# Patient Record
Sex: FEMALE | Race: BLACK OR AFRICAN AMERICAN | Employment: UNEMPLOYED | URBAN - METROPOLITAN AREA
[De-identification: names, ages, dates, MRNs, and addresses within clinical notes are randomized per-mention and may not be internally consistent; named-entity substitution may affect disease eponyms.]

---

## 2017-01-05 ENCOUNTER — OFFICE VISIT (OUTPATIENT)
Dept: CARDIOLOGY CLINIC | Age: 32
End: 2017-01-05

## 2017-01-05 VITALS
DIASTOLIC BLOOD PRESSURE: 120 MMHG | SYSTOLIC BLOOD PRESSURE: 160 MMHG | WEIGHT: 175 LBS | HEIGHT: 62 IN | HEART RATE: 94 BPM | OXYGEN SATURATION: 99 % | BODY MASS INDEX: 32.2 KG/M2

## 2017-01-05 DIAGNOSIS — R00.2 PALPITATION: Primary | ICD-10-CM

## 2017-01-05 NOTE — MR AVS SNAPSHOT
Visit Information Date & Time Provider Department Dept. Phone Encounter #  
 1/5/2017  9:20 AM Macarena Choudhary MD [facility] [id number] Your Appointments 2/9/2017  9:00 AM  
ROUTINE CARE with Iram Mendoza MD  
[facility] ([facility]) Appt Note: 3m fu bp/chol; .  
 [address] [address] [address] [place] [address] [id number] Upcoming Health Maintenance Date Due Pneumococcal 19-64 Medium Risk (1 of 1 - PPSV23) 4/8/2002 HEMOGLOBIN A1C Q6M 3/29/2017 MICROALBUMIN Q1 5/31/2017 FOOT EXAM Q1 6/19/2017 LIPID PANEL Q1 9/19/2017 PAP AKA CERVICAL CYTOLOGY 10/7/2017 EYE EXAM RETINAL OR DILATED Q1 11/4/2017 DTaP/Tdap/Td series (2 - Td) 10/15/2025 Allergies as of 1/5/2017  Review Complete On: 11/2/2016 By: Iram Mendoza MD  
  
 Severity Noted Reaction Type Reactions Latex  05/20/2010    Hives Macrobid [Nitrofurantoin Monohyd/m-cryst]  07/21/2011   Intolerance Diarrhea Novolog Mix 70-30 [Insulin Asp Prt-insulin Aspart]  01/31/2013   Intolerance Nausea and Vomiting Current Immunizations  Reviewed on 10/15/2015 Name Date Influenza Vaccine (Quad) PF 10/3/2016, 10/15/2015, 9/11/2014 Influenza Vaccine PF 12/11/2012  1:10 PM  
 Tdap 10/15/2015 Not reviewed this visit You Were Diagnosed With   
  
 Codes Comments Palpitation    -  Primary ICD-10-CM: R00.2 ICD-9-CM: 785.1 Vitals BP Pulse Height(growth percentile) Weight(growth percentile) SpO2 BMI  
 (!) 160/120 (BP 1 Location: Left arm, BP Patient Position: Sitting) 94 5' 2\" (1.575 m) 175 lb (79.4 kg) 99% 32.01 kg/m2 OB Status Smoking Status Having regular periods Never Smoker Vitals History BMI and BSA Data Body Mass Index Body Surface Area 32.01 kg/m 2 1.86 m 2 Preferred Pharmacy Pharmacy Name Phone [phone] [facility] [address] [phone] Your Updated Medication List  
  
   
This list is accurate as of: 1/5/17 10:54 AM.  Always use your most recent med list.  
  
  
  
  
 AMARYL 4 mg tablet Generic drug:  glimepiride Take  by mouth two (2) times a day. bethanechol 10 mg tablet Commonly known as:  URECHOLINE Blood-Glucose Meter monitoring kit  
by Does Not Apply route two (2) times a day. butalbital-acetaminophen-caffeine -40 mg per tablet Commonly known as:  Rosina Hanly Take 1 Tab by mouth every six (6) hours as needed for Pain. Max Daily Amount: 4 Tabs. fluticasone 50 mcg/actuation nasal spray Commonly known as:  Khang Redo 2 Sprays by Both Nostrils route daily as needed for Rhinitis or Allergies. furosemide 20 mg tablet Commonly known as:  LASIX Take as needed daily for leg swelling. glucose blood VI test strips strip Commonly known as:  ONETOUCH ULTRA TEST  
by Does Not Apply route. One touch ultra mini test strips HumaLOG 100 unit/mL injection Generic drug:  insulin lispro  
by SubCUTAneous route. 20-40 units with meals  
  
 losartan 100 mg tablet Commonly known as:  COZAAR Take 1 Tab by mouth daily. pregabalin 75 mg capsule Commonly known as:  Yunior Barks Take 1 Cap by mouth three (3) times daily. propranolol LA 80 mg SR capsule Commonly known as:  INDERAL LA  
TAKE 1 CAPSULE BY MOUTH DAILY  
  
 simvastatin 40 mg tablet Commonly known as:  ZOCOR Take 1 Tab by mouth nightly. SYNTHROID PO Take  by mouth. terconazole 0.4 % vaginal cream  
Commonly known as:  TERAZOL 7 Patient Instructions Amlodipine (By mouth) Amlodipine (hm-DZU-ed-peen) Treats high blood pressure and angina (chest pain). This medicine is a calcium channel blocker. Brand Name(s):Norvasc There may be other brand names for this medicine. When This Medicine Should Not Be Used: This medicine is not right for everyone. Do not use it if you had an allergic reaction to amlodipine. How to Use This Medicine:  
Tablet, Dissolving Tablet · Take your medicine as directed. Your dose may need to be changed several times to find what works best for you. Take this medicine at the same time each day. · Read and follow the patient instructions that come with this medicine. Talk to your doctor or pharmacist if you have any questions. · Missed dose: Take a dose as soon as you remember. If it has been more than 12 hours since you were supposed to take your dose, skip the missed dose and take your next regular dose at the regular time. · Store the medicine in a closed container at room temperature, away from heat, moisture, and direct light. Drugs and Foods to Avoid: Ask your doctor or pharmacist before using any other medicine, including over-the-counter medicines, vitamins, and herbal products. · Some medicines can affect how amlodipine works. Tell your doctor if you are also using any of the following: ¨ Clarithromycin, cyclosporine, diltiazem, itraconazole, ritonavir, sildenafil, simvastatin, tacrolimus Warnings While Using This Medicine: · Tell your doctor if you are pregnant or breastfeeding, or if you have liver disease, heart disease, coronary artery disease, or aortic stenosis. · This medicine could lower your blood pressure too much, especially when you first use it or if you are dehydrated. Stand or sit up slowly if you feel lightheaded or dizzy. · Your doctor will check your progress and the effects of this medicine at regular visits. Keep all appointments. · Do not stop using this medicine without asking your doctor, even if you feel well. This medicine will not cure high blood pressure, but it will help keep it in normal range.  You may have to take blood pressure medicine for the rest of your life. · Keep all medicine out of the reach of children. Never share your medicine with anyone. Possible Side Effects While Using This Medicine:  
Call your doctor right away if you notice any of these side effects: · Allergic reaction: Itching or hives, swelling in your face or hands, swelling or tingling in your mouth or throat, chest tightness, trouble breathing · Lightheadedness, dizziness · New or worsening chest pain · Swelling in your hands, ankles, or legs · Trouble breathing, nausea, unusual sweating, fainting If you notice other side effects that you think are caused by this medicine, tell your doctor. Call your doctor for medical advice about side effects. You may report side effects to FDA at 6-814-ERH-7255 © 2016 [address] is for End User's use only and may not be sold, redistributed or otherwise used for commercial purposes. The above information is an  only. It is not intended as medical advice for individual conditions or treatments. Talk to your doctor, nurse or pharmacist before following any medical regimen to see if it is safe and effective for you. Introducing [facility]! Dear Lonnie Henriquez: 
Thank you for requesting a [employer] account. Our records indicate that you already have an active [employer] account. You can access your account anytime at [email or web address] Did you know that you can access your hospital and ER discharge instructions at any time in [employer]? You can also review all of your test results from your hospital stay or ER visit. Additional Information If you have questions, please visit the Frequently Asked Questions section of the [employer] website at [email or web address] Remember, MyChart is NOT to be used for urgent needs. For medical emergencies, dial 911. Now available from your iPhone and Android! Please provide this summary of care documentation to your next provider. Your primary care clinician is listed as Tish Lambert. If you have any questions after today's visit, please call [phone].

## 2017-01-05 NOTE — PATIENT INSTRUCTIONS
Amlodipine (By mouth)   Amlodipine (kl-LQA-is-peen)  Treats high blood pressure and angina (chest pain). This medicine is a calcium channel blocker. Brand Name(s):Norvasc   There may be other brand names for this medicine. When This Medicine Should Not Be Used: This medicine is not right for everyone. Do not use it if you had an allergic reaction to amlodipine. How to Use This Medicine:   Tablet, Dissolving Tablet  · Take your medicine as directed. Your dose may need to be changed several times to find what works best for you. Take this medicine at the same time each day. · Read and follow the patient instructions that come with this medicine. Talk to your doctor or pharmacist if you have any questions. · Missed dose: Take a dose as soon as you remember. If it has been more than 12 hours since you were supposed to take your dose, skip the missed dose and take your next regular dose at the regular time. · Store the medicine in a closed container at room temperature, away from heat, moisture, and direct light. Drugs and Foods to Avoid:   Ask your doctor or pharmacist before using any other medicine, including over-the-counter medicines, vitamins, and herbal products. · Some medicines can affect how amlodipine works. Tell your doctor if you are also using any of the following:   ¨ Clarithromycin, cyclosporine, diltiazem, itraconazole, ritonavir, sildenafil, simvastatin, tacrolimus  Warnings While Using This Medicine:   · Tell your doctor if you are pregnant or breastfeeding, or if you have liver disease, heart disease, coronary artery disease, or aortic stenosis. · This medicine could lower your blood pressure too much, especially when you first use it or if you are dehydrated. Stand or sit up slowly if you feel lightheaded or dizzy. · Your doctor will check your progress and the effects of this medicine at regular visits. Keep all appointments.   · Do not stop using this medicine without asking your doctor, even if you feel well. This medicine will not cure high blood pressure, but it will help keep it in normal range. You may have to take blood pressure medicine for the rest of your life. · Keep all medicine out of the reach of children. Never share your medicine with anyone. Possible Side Effects While Using This Medicine:   Call your doctor right away if you notice any of these side effects:  · Allergic reaction: Itching or hives, swelling in your face or hands, swelling or tingling in your mouth or throat, chest tightness, trouble breathing  · Lightheadedness, dizziness  · New or worsening chest pain  · Swelling in your hands, ankles, or legs  · Trouble breathing, nausea, unusual sweating, fainting  If you notice other side effects that you think are caused by this medicine, tell your doctor. Call your doctor for medical advice about side effects. You may report side effects to FDA at 0-146-FDA-3059  © 2016 [address] is for End User's use only and may not be sold, redistributed or otherwise used for commercial purposes. The above information is an  only. It is not intended as medical advice for individual conditions or treatments. Talk to your doctor, nurse or pharmacist before following any medical regimen to see if it is safe and effective for you.

## 2017-01-06 RX ORDER — AMLODIPINE BESYLATE 5 MG/1
5 TABLET ORAL DAILY
Qty: 30 TAB | Refills: 6 | Status: SHIPPED | OUTPATIENT
Start: 2017-01-06 | End: 2017-01-06 | Stop reason: SDUPTHER

## 2017-01-06 RX ORDER — AMLODIPINE BESYLATE 5 MG/1
5 TABLET ORAL DAILY
Qty: 30 TAB | Refills: 3 | Status: SHIPPED | OUTPATIENT
Start: 2017-01-06 | End: 2017-02-09 | Stop reason: DRUGHIGH

## 2017-01-06 NOTE — TELEPHONE ENCOUNTER
Pt bp today in the office was 160/100. Pt has not taking any medications this morning. Pt was to start taking the Norvasc 5 mg daily yesterday but was sent to wrong pharmacy. [facility]. Verbal order and read back per Dr Wesley Darling     Pt instructed to increase Norvasc to 10 mg today and tomorrow then decrease to 5 mg daily.

## 2017-01-06 NOTE — PROGRESS NOTES
\"       PATIENT NAME: Aleah Licona         33 y.o.      1985              DATE:1/5/2017    REASON FOR VISIT:palpitations    HISTORY OF PRESENT ILLNESS:The patient was given the diagnosis of a transient ischemic attack in September of last year. The symptoms were a little unusual for transient ischemic attack in that they involve pain in the left upper extremity as well as some weakness. In any event, she has been experiencing palpitations. The patient can't really tell me how often she experiences them. These are very brief in duration. It is not clear as to what they actually feel like. Sounds like a sensation of irregular heartbeat. They're not accompanied by syncope or presyncope. The patient is not experiencing chest pain. The patient does experience shortness of breath on exertion. The patient is a hypertensive diabetic. She is being treated for hyperlipidemia. There is no history of coronary artery disease or valvular heart disease. The patient apparently did have an echocardiogram done in September at the time of the diagnosis of transient ischemic attack. PAST MEDICAL HISTORY:   Past Medical History:    Diabetes ([facility] [address]                                                Diabetic neuropathy ([facility] [address]                                     Diabetic retinopathy                                          Hypercholesterolemia                                          PAST SURGICAL HISTORY:   Past Surgical History:    HX ORTHOPAEDIC                                   January 2*      Comment:right toe nail surgery Dr. Ramón Wallis:  Social History    Marital status: UNKNOWN             Spouse name:                       Years of education:                 Number of children:               Social History Main Topics    Smoking status: Never Smoker                                                                Smokeless status: Never Used                        Alcohol use: No              Drug use:  No ALLERGIES:    -- Latex -- Hives   -- Macrobid [Nitrofurantoin Monohyd/M-Cryst] -- Diarrhea   -- Novolog Mix 70-30 [Insulin Asp Prt-Insulin Aspart] -- Nausea and Vomiting     CURRENT MEDICATIONS:   Current Outpatient Prescriptions:  amLODIPine (NORVASC) 5 mg tablet, Take 1 Tab by mouth daily. propranolol LA (INDERAL LA) 80 mg SR capsule, TAKE 1 CAPSULE BY MOUTH DAILY  simvastatin (ZOCOR) 40 mg tablet, Take 1 Tab by mouth nightly. furosemide (LASIX) 20 mg tablet, Take as needed daily for leg swelling. losartan (COZAAR) 100 mg tablet, Take 1 Tab by mouth daily. butalbital-acetaminophen-caffeine (FIORICET, ESGIC) -40 mg per tablet, Take 1 Tab by mouth every six (6) hours as needed for Pain. Max Daily Amount: 4 Tabs. bethanechol (URECHOLINE) 10 mg tablet,   terconazole (TERAZOL 7) 0.4 % vaginal cream,   glimepiride (AMARYL) 4 mg tablet, Take  by mouth two (2) times a day. LEVOTHYROXINE SODIUM (SYNTHROID PO), Take  by mouth. insulin lispro (HUMALOG) 100 unit/mL injection, by SubCUTAneous route. 20-40 units with meals  fluticasone (FLONASE) 50 mcg/actuation nasal spray, 2 Sprays by Both Nostrils route daily as needed for Rhinitis or Allergies. pregabalin (LYRICA) 75 mg capsule, Take 1 Cap by mouth three (3) times daily. Blood-Glucose Meter monitoring kit, by Does Not Apply route two (2) times a day. glucose blood VI test strips (ONE TOUCH ULTRA TEST) strip, by Does Not Apply route. One touch ultra mini test strips  amLODIPine (NORVASC) 5 mg tablet, Take 1 Tab by mouth daily. No current facility-administered medications for this visit.        REVIEW of SYSTEMS:History obtained from chart review and the patient  General ROS: negative for - weight gain or weight loss  Hematological and Lymphatic ROS: negative for - bleeding problems  Respiratory ROS: history of present illness  Cardiovascular ROS: hhistory of present illness  Gastrointestinal ROS: no abdominal pain, change in bowel habits, or black or bloody stools  Neurological ROS: no TIA or stroke symptoms     PHYSICAL EXAMINATION:   BP (!) 160/120 (BP 1 Location: Left arm, BP Patient Position: Sitting)  Pulse 94  Ht 5' 2\" (1.575 m)  Wt 79.4 kg (175 lb)  SpO2 99%  BMI 32.01 kg/m2  BP Readings from Last 3 Encounters:  01/05/17 : (!) 160/120  11/01/16 : 130/80  10/03/16 : (!) 170/100    Pulse Readings from Last 3 Encounters:  01/05/17 : 94  11/01/16 : 83  10/03/16 : 88    Wt Readings from Last 3 Encounters:  01/05/17 : 79.4 kg (175 lb)  11/01/16 : 80.7 kg (178 lb)  10/03/16 : 79.8 kg (176 lb)    Gen. : Obese  female NAD. \"HEENT: Sclera clear. Mucous membranes pink and moist.  Neck: No jugular venous distention. Carotid upstrokes 2+ without bruits. Chest: Clear to percussion and auscultation. Heart: PMI not palpable. Regular rhythm. No murmur or gallop. Abdomen: Nontender without masses or organomegaly. Extremities: No edema. .  Skin: Warm and dry. No stasis changes. Neuro: Alert, oriented, speech WNL, no facial asymmetry. Gait WNL. \"    EKG: Sinus rhythm. Short W[place]    IMPRESSION:   Palpitations of unknown etiology  Short P-R syndrome  The patient is hypertensive today. A repeat blood pressure showed a systolic of approximately 200. Possible history of transient ischemic attack. Unusual in that it involved pain. Diabetes  Hyperlipidemia    PLAN:  Norvasc 5 mg p.o. now and then daily. Followup with primary care physician tomorrow for blood pressure check. Cardiac event recorder  Return to office after the event recorder    The diagnoses and plan were discussed with patient. All questions answered. Plan of care agreed to by all concerned. Marcelino Russell.  MD [place]       ,              \"

## 2017-01-10 DIAGNOSIS — I10 ESSENTIAL HYPERTENSION WITH GOAL BLOOD PRESSURE LESS THAN 130/80: ICD-10-CM

## 2017-01-11 RX ORDER — LOSARTAN POTASSIUM 100 MG/1
TABLET ORAL
Qty: 90 TAB | Refills: 0 | Status: SHIPPED | OUTPATIENT
Start: 2017-01-11 | End: 2018-04-10 | Stop reason: SDUPTHER

## 2017-01-12 LAB
CREATININE, EXTERNAL: 71.1
HBA1C MFR BLD HPLC: 9.4 %
LDL-C, EXTERNAL: 131
MICROALBUMIN UR TEST STR-MCNC: 4039.5 MG/DL

## 2017-02-09 ENCOUNTER — OFFICE VISIT (OUTPATIENT)
Dept: FAMILY MEDICINE CLINIC | Age: 32
End: 2017-02-09

## 2017-02-09 ENCOUNTER — HOSPITAL ENCOUNTER (OUTPATIENT)
Dept: LAB | Age: 32
Discharge: HOME | End: 2017-02-09
Payer: MEDICARE

## 2017-02-09 VITALS
RESPIRATION RATE: 16 BRPM | TEMPERATURE: 98 F | HEIGHT: 62 IN | SYSTOLIC BLOOD PRESSURE: 160 MMHG | BODY MASS INDEX: 32.76 KG/M2 | OXYGEN SATURATION: 99 % | WEIGHT: 178 LBS | DIASTOLIC BLOOD PRESSURE: 100 MMHG | HEART RATE: 95 BPM

## 2017-02-09 DIAGNOSIS — I10 HTN (HYPERTENSION), BENIGN: ICD-10-CM

## 2017-02-09 DIAGNOSIS — E78.00 PURE HYPERCHOLESTEROLEMIA: ICD-10-CM

## 2017-02-09 DIAGNOSIS — E78.00 PURE HYPERCHOLESTEROLEMIA: Primary | ICD-10-CM

## 2017-02-09 LAB
ALT SERPL-CCNC: 16 U/L (ref 13–56)
ANION GAP BLD CALC-SCNC: 10 MMOL/L (ref 3–18)
AST SERPL W P-5'-P-CCNC: 16 U/L (ref 15–37)
BUN SERPL-MCNC: 24 MG/DL (ref 7–18)
BUN/CREAT SERPL: 17 (ref 12–20)
CALCIUM SERPL-MCNC: 8.4 MG/DL (ref 8.5–10.1)
CHLORIDE SERPL-SCNC: 106 MMOL/L (ref 100–108)
CHOLEST SERPL-MCNC: 288 MG/DL
CO2 SERPL-SCNC: 25 MMOL/L (ref 21–32)
CREAT SERPL-MCNC: 1.45 MG/DL (ref 0.6–1.3)
GLUCOSE SERPL-MCNC: 322 MG/DL (ref 74–99)
HDLC SERPL-MCNC: 55 MG/DL (ref 40–60)
HDLC SERPL: 5.2 {RATIO} (ref 0–5)
LDLC SERPL CALC-MCNC: 189.6 MG/DL (ref 0–100)
LIPID PROFILE,FLP: ABNORMAL
POTASSIUM SERPL-SCNC: 4.2 MMOL/L (ref 3.5–5.5)
SODIUM SERPL-SCNC: 141 MMOL/L (ref 136–145)
TRIGL SERPL-MCNC: 217 MG/DL (ref ?–150)
TSH SERPL DL<=0.05 MIU/L-ACNC: 2.38 UIU/ML (ref 0.36–3.74)
VLDLC SERPL CALC-MCNC: 43.4 MG/DL

## 2017-02-09 PROCEDURE — 80061 LIPID PANEL: CPT | Performed by: FAMILY MEDICINE

## 2017-02-09 PROCEDURE — 84450 TRANSFERASE (AST) (SGOT): CPT | Performed by: FAMILY MEDICINE

## 2017-02-09 PROCEDURE — 84443 ASSAY THYROID STIM HORMONE: CPT | Performed by: FAMILY MEDICINE

## 2017-02-09 PROCEDURE — 80048 BASIC METABOLIC PNL TOTAL CA: CPT | Performed by: FAMILY MEDICINE

## 2017-02-09 PROCEDURE — 84460 ALANINE AMINO (ALT) (SGPT): CPT | Performed by: FAMILY MEDICINE

## 2017-02-09 PROCEDURE — 36415 COLL VENOUS BLD VENIPUNCTURE: CPT | Performed by: FAMILY MEDICINE

## 2017-02-09 RX ORDER — BUTALBITAL, ACETAMINOPHEN AND CAFFEINE 50; 325; 40 MG/1; MG/1; MG/1
1 TABLET ORAL
Qty: 30 TAB | Refills: 0 | Status: SHIPPED | OUTPATIENT
Start: 2017-02-09 | End: 2018-04-27 | Stop reason: SDUPTHER

## 2017-02-09 RX ORDER — PROPRANOLOL HYDROCHLORIDE 120 MG/1
120 CAPSULE, EXTENDED RELEASE ORAL DAILY
Qty: 30 CAP | Refills: 6 | Status: SHIPPED | OUTPATIENT
Start: 2017-02-09 | End: 2017-03-03 | Stop reason: SDUPTHER

## 2017-02-09 NOTE — PROGRESS NOTES
HISTORY OF PRESENT ILLNESS  Ana Maria Crawford is a 33 y.o. female. HPI  Patient is here today for evaluation and treatment of: Hypertension/Cholesterol problem    Hypertension: Pts BP is up today. Pt has been prescribed Cozaar, Norvasc, and Inderal.  She is under the care of Cardiology; she has had a recent visit with cardiology for heart palpitations. Cholesterol: Continues on zocor; She is managing her cholesterol with exercise as well. She is under the care of endocrine for her Diabetes. States that she has had a strong odor to her urine; She has seen urology/gyn for sx; she has seen renal as well; she has also has been noted to have hematuria; states she would like a referral to a renal MD. She has seen gynecology as well for recurrent BV; she has an ongoing rx for flagyl and has treated herself empirically for this and states that BV is currently controlled. PMH,  Meds, Allergies, Family History, Social history reviewed        Current Outpatient Prescriptions:     butalbital-acetaminophen-caffeine (FIORICET, ESGIC) -40 mg per tablet, Take 1 Tab by mouth every six (6) hours as needed for Pain. Max Daily Amount: 4 Tabs., Disp: 30 Tab, Rfl: 0    propranolol LA (INDERAL LA) 120 mg SR capsule, Take 1 Cap by mouth daily. , Disp: 30 Cap, Rfl: 6    losartan (COZAAR) 100 mg tablet, TAKE 1 TABLET BY MOUTH DAILY, Disp: 90 Tab, Rfl: 0    simvastatin (ZOCOR) 40 mg tablet, Take 1 Tab by mouth nightly., Disp: 30 Tab, Rfl: 3    furosemide (LASIX) 20 mg tablet, Take as needed daily for leg swelling., Disp: 30 Tab, Rfl: 3    bethanechol (URECHOLINE) 10 mg tablet, , Disp: , Rfl:     terconazole (TERAZOL 7) 0.4 % vaginal cream, , Disp: , Rfl: 11    glimepiride (AMARYL) 4 mg tablet, Take  by mouth two (2) times a day., Disp: , Rfl:     LEVOTHYROXINE SODIUM (SYNTHROID PO), Take  by mouth., Disp: , Rfl:     insulin lispro (HUMALOG) 100 unit/mL injection, by SubCUTAneous route.  20-40 units with meals, Disp: , Rfl:     fluticasone (FLONASE) 50 mcg/actuation nasal spray, 2 Sprays by Both Nostrils route daily as needed for Rhinitis or Allergies. , Disp: 1 Bottle, Rfl: 3    pregabalin (LYRICA) 75 mg capsule, Take 1 Cap by mouth three (3) times daily. , Disp: 90 Cap, Rfl: 3    Blood-Glucose Meter monitoring kit, by Does Not Apply route two (2) times a day., Disp: 1 Kit, Rfl: 0    glucose blood VI test strips (ONE TOUCH ULTRA TEST) strip, by Does Not Apply route. One touch ultra mini test strips, Disp: 1 Package, Rfl: 11      PMH,  Meds, Allergies, Family History, Social history reviewed    Review of Systems   Constitutional: Negative for chills and fever. Cardiovascular: Positive for palpitations. Negative for chest pain. Physical Exam   Constitutional: She appears well-developed and well-nourished. No distress. Neck: Neck supple. Cardiovascular: Normal rate and regular rhythm. Pulmonary/Chest: Breath sounds normal. No respiratory distress. She has no wheezes. She has no rales. Nursing note and vitals reviewed. neck supple; sensitive with thyroid exam.  Visit Vitals    BP (!) 160/100    Pulse 95    Temp 98 °F (36.7 °C) (Oral)    Resp 16    Ht 5' 2\" (1.575 m)    Wt 178 lb (80.7 kg)    LMP 01/30/2017    SpO2 99%    BMI 32.56 kg/m2         ASSESSMENT and PLAN    ICD-10-CM ICD-9-CM    1. Pure hypercholesterolemia E78.00 272.0 AST      ALT      LIPID PANEL   2. HTN (hypertension), benign U96 087.1 METABOLIC PANEL, BASIC      TSH 3RD GENERATION       As above, BP not controlled  Increase propranolol to 120 daily  Continue other current meds. Labs as ordered  Continue current meds as ordered  Advised pt to follow up with urogyn or consider a second opinion regarding the urinary sx she describes  Follow-up Disposition:  Return in about 6 weeks (around 3/23/2017) for HTN. An After Visit Summary was printed and given to the patient.   This has been fully explained to the patient, who indicates understanding.

## 2017-02-09 NOTE — PATIENT INSTRUCTIONS

## 2017-02-09 NOTE — MR AVS SNAPSHOT
Visit Information Date & Time Provider Department Dept. Phone Encounter #  
 2/9/2017  9:00 AM Juan Fontaine, [address] [id number] Follow-up Instructions Return in about 6 weeks (around 3/23/2017) for HTN. Your Appointments 3/3/2017  9:20 AM  
Follow Up with Lilliana Palacio MD  
[facility] [place] [facility] ([facility]) Appt Note: 2 month f/up [place] [address] [address] [address] [phone] [facility] [place] [address] Upcoming Health Maintenance Date Due Pneumococcal 19-64 Medium Risk (1 of 1 - PPSV23) 4/8/2002 FOOT EXAM Q1 6/19/2017 HEMOGLOBIN A1C Q6M 7/12/2017 PAP AKA CERVICAL CYTOLOGY 10/7/2017 EYE EXAM RETINAL OR DILATED Q1 11/4/2017 MICROALBUMIN Q1 1/12/2018 LIPID PANEL Q1 1/12/2018 DTaP/Tdap/Td series (2 - Td) 10/15/2025 Allergies as of 2/9/2017  Review Complete On: 2/9/2017 By: Juan Fontaine MD  
  
 Severity Noted Reaction Type Reactions Latex  05/20/2010    Hives Macrobid [Nitrofurantoin Monohyd/m-cryst]  07/21/2011   Intolerance Diarrhea Novolog Mix 70-30 [Insulin Asp Prt-insulin Aspart]  01/31/2013   Intolerance Nausea and Vomiting Current Immunizations  Reviewed on 10/15/2015 Name Date Influenza Vaccine (Quad) PF 10/3/2016, 10/15/2015, 9/11/2014 Influenza Vaccine PF 12/11/2012  1:10 PM  
 Tdap 10/15/2015 Not reviewed this visit You Were Diagnosed With   
  
 Codes Comments Pure hypercholesterolemia    -  Primary ICD-10-CM: E78.00 ICD-9-CM: 272.0   
 HTN (hypertension), benign     ICD-10-CM: I10 
ICD-9-CM: 401.1 Vitals BP Pulse Temp Resp Height(growth percentile) Weight(growth percentile) (!) 160/100 95 98 °F (36.7 °C) (Oral) 16 5' 2\" (1.575 m) 178 lb (80.7 kg) LMP SpO2 BMI OB Status Smoking Status 01/30/2017 99% 32.56 kg/m2 Having regular periods Never Smoker Vitals History BMI and BSA Data Body Mass Index Body Surface Area 32.56 kg/m 2 1.88 m 2 Preferred Pharmacy Pharmacy Name Phone [facility] [phone] [facility] [address] 18 [phone] Your Updated Medication List  
  
   
This list is accurate as of: 2/9/17  9:55 AM.  Always use your most recent med list.  
  
  
  
  
 AMARYL 4 mg tablet Generic drug:  glimepiride Take  by mouth two (2) times a day. bethanechol 10 mg tablet Commonly known as:  URECHOLINE Blood-Glucose Meter monitoring kit  
by Does Not Apply route two (2) times a day. butalbital-acetaminophen-caffeine -40 mg per tablet Commonly known as:  Thais Bigger Take 1 Tab by mouth every six (6) hours as needed for Pain. Max Daily Amount: 4 Tabs. fluticasone 50 mcg/actuation nasal spray Commonly known as:  Zonia Jumper 2 Sprays by Both Nostrils route daily as needed for Rhinitis or Allergies. furosemide 20 mg tablet Commonly known as:  LASIX Take as needed daily for leg swelling. glucose blood VI test strips strip Commonly known as:  ONETOUCH ULTRA TEST  
by Does Not Apply route. One touch ultra mini test strips HumaLOG 100 unit/mL injection Generic drug:  insulin lispro  
by SubCUTAneous route. 20-40 units with meals  
  
 losartan 100 mg tablet Commonly known as:  COZAAR  
TAKE 1 TABLET BY MOUTH DAILY pregabalin 75 mg capsule Commonly known as:  Genesis Garrett Take 1 Cap by mouth three (3) times daily. propranolol  mg SR capsule Commonly known as:  INDERAL LA Take 1 Cap by mouth daily. simvastatin 40 mg tablet Commonly known as:  ZOCOR Take 1 Tab by mouth nightly. SYNTHROID PO Take  by mouth.   
  
 terconazole 0.4 % vaginal cream  
Commonly known as:  TERAZOL 7  
  
  
  
  
 Prescriptions Printed Refills  
 butalbital-acetaminophen-caffeine (FIORICET, ESGIC) -40 mg per tablet 0 Sig: Take 1 Tab by mouth every six (6) hours as needed for Pain. Max Daily Amount: 4 Tabs. Class: Print Route: Oral  
  
Prescriptions Sent to Pharmacy Refills  
 propranolol LA (INDERAL LA) 120 mg SR capsule 6 Sig: Take 1 Cap by mouth daily. Class: Normal  
 Pharmacy: [facility] [phone] Ph #: [phone] Route: Oral  
  
Follow-up Instructions Return in about 6 weeks (around 3/23/2017) for HTN. To-Do List   
 02/09/2017 Lab:  ALT   
  
 02/09/2017 Lab:  AST   
  
 02/09/2017 Lab:  LIPID PANEL   
  
 02/09/2017 Lab:  METABOLIC PANEL, BASIC Patient Instructions High Blood Pressure: Care Instructions Your Care Instructions If your blood pressure is usually above 140/90, you have high blood pressure, or hypertension. That means the top number is 140 or higher or the bottom number is 90 or higher, or both. Despite what a lot of people think, high blood pressure usually doesn't cause headaches or make you feel dizzy or lightheaded. It usually has no symptoms. But it does increase your risk for heart attack, stroke, and kidney or eye damage. The higher your blood pressure, the more your risk increases. Your doctor will give you a goal for your blood pressure. Your goal will be based on your health and your age. An example of a goal is to keep your blood pressure below 140/90. Lifestyle changes, such as eating healthy and being active, are always important to help lower blood pressure. You might also take medicine to reach your blood pressure goal. 
Follow-up care is a key part of your treatment and safety.  Be sure to make and go to all appointments, and call your doctor if you are having problems. It's also a good idea to know your test results and keep a list of the medicines you take. How can you care for yourself at home? Medical treatment · If you stop taking your medicine, your blood pressure will go back up. You may take one or more types of medicine to lower your blood pressure. Be safe with medicines. Take your medicine exactly as prescribed. Call your doctor if you think you are having a problem with your medicine. · Talk to your doctor before you start taking aspirin every day. Aspirin can help certain people lower their risk of a heart attack or stroke. But taking aspirin isn't right for everyone, because it can cause serious bleeding. · See your doctor regularly. You may need to see the doctor more often at first or until your blood pressure comes down. · If you are taking blood pressure medicine, talk to your doctor before you take decongestants or anti-inflammatory medicine, such as ibuprofen. Some of these medicines can raise blood pressure. · Learn how to check your blood pressure at home. Lifestyle changes · Stay at a healthy weight. This is especially important if you put on weight around the waist. Losing even 10 pounds can help you lower your blood pressure. · If your doctor recommends it, get more exercise. Walking is a good choice. Bit by bit, increase the amount you walk every day. Try for at least 30 minutes on most days of the week. You also may want to swim, bike, or do other activities. · Avoid or limit alcohol. Talk to your doctor about whether you can drink any alcohol. · Try to limit how much sodium you eat to less than 2,300 milligrams (mg) a day. Your doctor may ask you to try to eat less than 1,500 mg a day. · Eat plenty of fruits (such as bananas and oranges), vegetables, legumes, whole grains, and low-fat dairy products. · Lower the amount of saturated fat in your diet.  Saturated fat is found in animal products such as milk, cheese, and meat. Limiting these foods may help you lose weight and also lower your risk for heart disease. · Do not smoke. Smoking increases your risk for heart attack and stroke. If you need help quitting, talk to your doctor about stop-smoking programs and medicines. These can increase your chances of quitting for good. When should you call for help? Call 911 anytime you think you may need emergency care. This may mean having symptoms that suggest that your blood pressure is causing a serious heart or blood vessel problem. Your blood pressure may be over 180/110. For example, call 911 if: 
· You have symptoms of a heart attack. These may include: ¨ Chest pain or pressure, or a strange feeling in the chest. 
¨ Sweating. ¨ Shortness of breath. ¨ Nausea or vomiting. ¨ Pain, pressure, or a strange feeling in the back, neck, jaw, or upper belly or in one or both shoulders or arms. ¨ Lightheadedness or sudden weakness. ¨ A fast or irregular heartbeat. · You have symptoms of a stroke. These may include: 
¨ Sudden numbness, tingling, weakness, or loss of movement in your face, arm, or leg, especially on only one side of your body. ¨ Sudden vision changes. ¨ Sudden trouble speaking. ¨ Sudden confusion or trouble understanding simple statements. ¨ Sudden problems with walking or balance. ¨ A sudden, severe headache that is different from past headaches. · You have severe back or belly pain. Do not wait until your blood pressure comes down on its own. Get help right away. Call your doctor now or seek immediate care if: 
· Your blood pressure is much higher than normal (such as 180/110 or higher), but you don't have symptoms. · You think high blood pressure is causing symptoms, such as: ¨ Severe headache. ¨ Blurry vision. Watch closely for changes in your health, and be sure to contact your doctor if: · Your blood pressure measures 140/90 or higher at least 2 times. That means the top number is 140 or higher or the bottom number is 90 or higher, or both. · You think you may be having side effects from your blood pressure medicine. · Your blood pressure is usually normal, but it goes above normal at least 2 times. Where can you learn more? Go to http://aram-klaus.info/. Enter O486 in the search box to learn more about \"High Blood Pressure: Care Instructions. \" Current as of: August 8, 2016 Content Version: 11.1 © 7230-9310 [employer]. Care instructions adapted under license by [employer] (which disclaims liability or warranty for this information). If you have questions about a medical condition or this instruction, always ask your healthcare professional. [address] any warranty or liability for your use of this information. Introducing hospitals & HEALTH SERVICES! Dear Miriam Hatch: 
Thank you for requesting a [facility] account. Our records indicate that you already have an active [facility] account. You can access your account anytime at [email or web address] Did you know that you can access your hospital and ER discharge instructions at any time in [facility]? You can also review all of your test results from your hospital stay or ER visit. Additional Information If you have questions, please visit the Frequently Asked Questions section of the [facility] website at [email or web address] Remember, [facility] is NOT to be used for urgent needs. For medical emergencies, dial 911. Now available from your iPhone and Android! Please provide this summary of care documentation to your next provider. Your primary care clinician is listed as [address]. If you have any questions after today's visit, please call [phone].

## 2017-03-03 ENCOUNTER — OFFICE VISIT (OUTPATIENT)
Dept: CARDIOLOGY CLINIC | Age: 32
End: 2017-03-03

## 2017-03-03 VITALS
BODY MASS INDEX: 32.39 KG/M2 | DIASTOLIC BLOOD PRESSURE: 82 MMHG | HEIGHT: 62 IN | HEART RATE: 87 BPM | WEIGHT: 176 LBS | SYSTOLIC BLOOD PRESSURE: 132 MMHG | OXYGEN SATURATION: 97 %

## 2017-03-03 DIAGNOSIS — I10 ESSENTIAL HYPERTENSION: ICD-10-CM

## 2017-03-03 DIAGNOSIS — G45.1 HEMISPHERIC CAROTID ARTERY SYNDROME: ICD-10-CM

## 2017-03-03 DIAGNOSIS — R00.2 PALPITATIONS: Primary | ICD-10-CM

## 2017-03-03 DIAGNOSIS — I48.0 PAROXYSMAL ATRIAL FIBRILLATION (HCC): ICD-10-CM

## 2017-03-03 RX ORDER — CIPROFLOXACIN 250 MG/1
250 TABLET, FILM COATED ORAL 2 TIMES DAILY
Qty: 20 TAB | Refills: 0 | Status: SHIPPED | OUTPATIENT
Start: 2017-03-03 | End: 2017-05-09 | Stop reason: ALTCHOICE

## 2017-03-03 RX ORDER — PROPRANOLOL HYDROCHLORIDE 80 MG/1
80 CAPSULE, EXTENDED RELEASE ORAL 2 TIMES DAILY
Qty: 60 CAP | Refills: 11 | Status: SHIPPED | OUTPATIENT
Start: 2017-03-03 | End: 2017-07-06 | Stop reason: SDUPTHER

## 2017-03-03 NOTE — PROGRESS NOTES
1. Have you been to the ER, urgent care clinic since your last visit? Hospitalized since your last visit? No  2. Have you seen or consulted any other health care providers outside of the [address] [place] [address] since your last visit? Include any pap smears or colon screening.  no

## 2017-03-03 NOTE — MR AVS SNAPSHOT
Visit Information Date & Time Provider Department Dept. Phone Encounter #  
 3/3/2017  9:20 AM Juan Bowden MD [facility] [address] [id number] Your Appointments 3/23/2017  9:00 AM  
ROUTINE CARE with MD Temi Ferguson ([facility]) Appt Note: fu on htn  
 [address] [address] [address] [place] [address] [address] 7/13/2017  9:20 AM  
MEDICARE EXT with MD Temi Ferguson (Quny[facility] Escort) Appt Note: [id number]  
 [address] [address] 2 [place] [address] [address] Upcoming Health Maintenance Date Due Pneumococcal 19-64 Medium Risk (1 of 1 - PPSV23) 4/8/2002 FOOT EXAM Q1 6/19/2017 HEMOGLOBIN A1C Q6M 7/12/2017 PAP AKA CERVICAL CYTOLOGY 10/7/2017 EYE EXAM RETINAL OR DILATED Q1 11/4/2017 MICROALBUMIN Q1 1/12/2018 LIPID PANEL Q1 2/9/2018 DTaP/Tdap/Td series (2 - Td) 10/15/2025 Allergies as of 3/3/2017  Review Complete On: 3/3/2017 By: Hima Parks Severity Noted Reaction Type Reactions Latex  05/20/2010    Hives Macrobid [Nitrofurantoin Monohyd/m-cryst]  07/21/2011   Intolerance Diarrhea Novolog Mix 70-30 [Insulin Asp Prt-insulin Aspart]  01/31/2013   Intolerance Nausea and Vomiting Current Immunizations  Reviewed on 10/15/2015 Name Date Influenza Vaccine (Quad) PF 10/3/2016, 10/15/2015, 9/11/2014 Influenza Vaccine PF 12/11/2012  1:10 PM  
 Tdap 10/15/2015 Not reviewed this visit You Were Diagnosed With   
  
 Codes Comments Palpitations    -  Primary ICD-10-CM: R00.2 ICD-9-CM: 785.1 Vitals BP  
  
  
  
  
  
 132/82 Vitals History BMI and BSA Data Body Mass Index Body Surface Area  
 32.19 kg/m 2 1.87 m 2 Preferred Pharmacy Pharmacy Name Phone [facility] [phone] [facility] [place]  [phone] Your Updated Medication List  
  
   
This list is accurate as of: 3/3/17 10:37 AM.  Always use your most recent med list.  
  
  
  
  
 AMARYL 4 mg tablet Generic drug:  glimepiride Take  by mouth two (2) times a day. bethanechol 10 mg tablet Commonly known as:  URECHOLINE Blood-Glucose Meter monitoring kit  
by Does Not Apply route two (2) times a day. butalbital-acetaminophen-caffeine -40 mg per tablet Commonly known as:  Olevia Zelda Take 1 Tab by mouth every six (6) hours as needed for Pain. Max Daily Amount: 4 Tabs. ciprofloxacin HCl 250 mg tablet Commonly known as:  CIPRO Take 1 Tab by mouth two (2) times a day. fluticasone 50 mcg/actuation nasal spray Commonly known as:  Shelvia Birks 2 Sprays by Both Nostrils route daily as needed for Rhinitis or Allergies. furosemide 20 mg tablet Commonly known as:  LASIX Take as needed daily for leg swelling. glucose blood VI test strips strip Commonly known as:  ONETOUCH ULTRA TEST  
by Does Not Apply route. One touch ultra mini test strips HumaLOG 100 unit/mL injection Generic drug:  insulin lispro  
by SubCUTAneous route. 20-40 units with meals  
  
 losartan 100 mg tablet Commonly known as:  COZAAR  
TAKE 1 TABLET BY MOUTH DAILY pregabalin 75 mg capsule Commonly known as:  Bianca Crosser Take 1 Cap by mouth three (3) times daily. propranolol LA 80 mg SR capsule Commonly known as:  INDERAL LA Take 1 Cap by mouth two (2) times a day. rivaroxaban 15 mg Tab tablet Commonly known as:  Onur Safe Take 1 Tab by mouth daily. simvastatin 40 mg tablet Commonly known as:  ZOCOR Take 1 Tab by mouth nightly. SYNTHROID PO Take  by mouth.   
  
 terconazole 0.4 % vaginal cream  
Commonly known as:  TERAZOL 7  
  
  
  
  
 Prescriptions Printed Refills  
 propranolol LA (INDERAL LA) 80 mg SR capsule 11 Sig: Take 1 Cap by mouth two (2) times a day. Class: Print Route: Oral  
 rivaroxaban (XARELTO) 15 mg tab tablet 11 Sig: Take 1 Tab by mouth daily. Class: Print Route: Oral  
 ciprofloxacin HCl (CIPRO) 250 mg tablet 0 Sig: Take 1 Tab by mouth two (2) times a day. Class: Print Route: Oral  
  
We Performed the Following AMB POC EKG ROUTINE W/ 12 LEADS, INTER & REP [99895 CPT(R)] Introducing [facility]! Dear Vaishnavi Covington: 
Thank you for requesting a [employer] account. Our records indicate that you already have an active [employer] account. You can access your account anytime at [email or web address] Did you know that you can access your hospital and ER discharge instructions at any time in [employer]? You can also review all of your test results from your hospital stay or ER visit. Additional Information If you have questions, please visit the Frequently Asked Questions section of the [employer] website at [email or web address] Remember, [employer] is NOT to be used for urgent needs. For medical emergencies, dial 911. Now available from your iPhone and Android! Please provide this summary of care documentation to your next provider. Your primary care clinician is listed as [address]. If you have any questions after today's visit, please call [phone].

## 2017-03-03 NOTE — PROGRESS NOTES
PATIENT NAME: Mandi Patterson         33 y.o.      1985              DATE:3/3/2017    REASON FOR VISIT: Follow-up on event recorder    HISTORY OF PRESENT ILLNESS: Event recorder showed paroxysms of atrial fibrillation with a rapid ventricular response. Patient has a history of a transient ischemic attack. She experiences palpitations. She has not experienced any new neurologic symptoms. She is diabetic. She is also hypertensive. She has a history of hyperlipidemia. There is no history of coronary artery disease. Denies chest pain and other symptoms suggestive of angina. Denies ALLEN, PND, orthopnea. Denies , syncope, presyncope. Denies edema in the lower extremities. PAST MEDICAL HISTORY:   Past Medical History:  No date: [facility]  No date: Diabetic neuropathy ([facility]  No date: Diabetic retinopathy  No date: Hypercholesterolemia    PAST SURGICAL HISTORY:   Past Surgical History:  January 2013: HX ORTHOPAEDIC      Comment: right toe nail surgery Dr. Naina Lynn:  Social History    Marital status: UNKNOWN             Spouse name:                       Years of education:                 Number of children:               Social History Main Topics    Smoking status: Never Smoker                                                                Smokeless status: Never Used                        Alcohol use: No              Drug use: No                ALLERGIES:    -- Latex -- Hives   -- Macrobid [Nitrofurantoin Monohyd/M-Cryst] -- Diarrhea   -- Novolog Mix 70-30 [Insulin Asp Prt-Insulin Aspart] -- Nausea and Vomiting     CURRENT MEDICATIONS:   Current Outpatient Prescriptions:  butalbital-acetaminophen-caffeine (FIORICET, ESGIC) -40 mg per tablet, Take 1 Tab by mouth every six (6) hours as needed for Pain. Max Daily Amount: 4 Tabs. propranolol LA (INDERAL LA) 120 mg SR capsule, Take 1 Cap by mouth daily.   losartan (COZAAR) 100 mg tablet, TAKE 1 TABLET BY MOUTH DAILY  simvastatin (ZOCOR) 40 mg tablet, Take 1 Tab by mouth nightly. furosemide (LASIX) 20 mg tablet, Take as needed daily for leg swelling. bethanechol (URECHOLINE) 10 mg tablet,   terconazole (TERAZOL 7) 0.4 % vaginal cream,   glimepiride (AMARYL) 4 mg tablet, Take  by mouth two (2) times a day. LEVOTHYROXINE SODIUM (SYNTHROID PO), Take  by mouth. insulin lispro (HUMALOG) 100 unit/mL injection, by SubCUTAneous route. 20-40 units with meals  fluticasone (FLONASE) 50 mcg/actuation nasal spray, 2 Sprays by Both Nostrils route daily as needed for Rhinitis or Allergies. pregabalin (LYRICA) 75 mg capsule, Take 1 Cap by mouth three (3) times daily. Blood-Glucose Meter monitoring kit, by Does Not Apply route two (2) times a day. glucose blood VI test strips (ONE TOUCH ULTRA TEST) strip, by Does Not Apply route. One touch ultra mini test strips    No current facility-administered medications for this visit. REVIEW of SYSTEMS:History obtained from chart review and the patient  General ROS: negative for - weight gain or weight loss  Hematological and Lymphatic ROS: negative for - bleeding problems  Respiratory ROS: no cough, shortness of breath, or wheezing  Cardiovascular ROS: Please see history of present illness  Gastrointestinal ROS: no abdominal pain, change in bowel habits, or black or bloody stools  Neurological ROS: no TIA or stroke symptoms     PHYSICAL EXAMINATION:   /82  Pulse 87  Ht 5' 2\" (1.575 m)  Wt 79.8 kg (176 lb)  LMP 01/30/2017  SpO2 97%  BMI 32.19 kg/m2  BP Readings from Last 3 Encounters:  03/03/17 : 132/82  02/09/17 : (!) 160/100  01/05/17 : (!) 160/120    Pulse Readings from Last 3 Encounters:  03/03/17 : 87  02/09/17 : 95  01/05/17 : 94    Wt Readings from Last 3 Encounters:  03/03/17 : 79.8 kg (176 lb)  02/09/17 : 80.7 kg (178 lb)  01/05/17 : 79.4 kg (175 lb)    Moderately obese female in no apparent distress. HEENT: Sclera clear.   Mucous membranes pink and moist.  Neck: No jugular venous distention. Carotid upstrokes 2+ without bruits. Chest: Clear to percussion and auscultation. Heart: PMI not palpable. Regular rhythm. No murmur or gallop. Abdomen: Nontender without masses or organomegaly. Extremities: No edema. Dorsalis pedis and posterior tibial pulses 2+. Skin: Warm and dry. No stasis changes. Neuro: Alert, oriented, speech WNL, no facial asymmetry. Gait WNL. IMPRESSION:   Paroxysmal atrial fibrillation  History of transient ischemic attack  Renal insufficiency  Hypertension  Hyper lipidemia    PLAN:  Xarelto 15 mg p.o. daily  Increase propranolol LA to 80 mg twice daily  Return in one month    The diagnoses and plan were discussed with patient. All questions answered. Plan of care agreed to by all concerned. Travis Mcwilliams.  MD [place]       ,

## 2017-03-23 RX ORDER — PROPRANOLOL HYDROCHLORIDE 80 MG/1
80 CAPSULE, EXTENDED RELEASE ORAL 2 TIMES DAILY
Qty: 180 CAP | Refills: 3 | OUTPATIENT
Start: 2017-03-23

## 2017-03-31 ENCOUNTER — OFFICE VISIT (OUTPATIENT)
Dept: CARDIOLOGY CLINIC | Age: 32
End: 2017-03-31

## 2017-03-31 VITALS
OXYGEN SATURATION: 98 % | BODY MASS INDEX: 31.65 KG/M2 | DIASTOLIC BLOOD PRESSURE: 76 MMHG | RESPIRATION RATE: 18 BRPM | HEART RATE: 82 BPM | SYSTOLIC BLOOD PRESSURE: 110 MMHG | WEIGHT: 172 LBS | HEIGHT: 62 IN

## 2017-03-31 DIAGNOSIS — G45.8 OTHER SPECIFIED TRANSIENT CEREBRAL ISCHEMIAS: ICD-10-CM

## 2017-03-31 DIAGNOSIS — N28.9 RENAL INSUFFICIENCY: ICD-10-CM

## 2017-03-31 DIAGNOSIS — Z79.01 CHRONIC ANTICOAGULATION: ICD-10-CM

## 2017-03-31 DIAGNOSIS — I48.0 PAROXYSMAL ATRIAL FIBRILLATION (HCC): Primary | ICD-10-CM

## 2017-03-31 NOTE — MR AVS SNAPSHOT
Visit Information Date & Time Provider Department Dept. Phone Encounter #  
 3/31/2017  9:40 AM Sheeba Velarde MD [facility] [address] [id number] Your Appointments 7/13/2017  9:20 AM  
MEDICARE EXT with Jeanine Etienne MD  
[address] [facility] ([address]) Appt Note: [id number]  
 [address] [address] 2 [address] [place] [address] [address] Upcoming Health Maintenance Date Due Pneumococcal 19-64 Medium Risk (1 of 1 - PPSV23) 4/8/2002 FOOT EXAM Q1 6/19/2017 HEMOGLOBIN A1C Q6M 7/12/2017 PAP AKA CERVICAL CYTOLOGY 10/7/2017 EYE EXAM RETINAL OR DILATED Q1 11/4/2017 MICROALBUMIN Q1 1/12/2018 LIPID PANEL Q1 2/9/2018 DTaP/Tdap/Td series (2 - Td) 10/15/2025 Allergies as of 3/31/2017  Review Complete On: 3/31/2017 By: Greg Nyhan, LPN Severity Noted Reaction Type Reactions Latex  05/20/2010    Hives Macrobid [Nitrofurantoin Monohyd/m-cryst]  07/21/2011   Intolerance Diarrhea Novolog Mix 70-30 [Insulin Asp Prt-insulin Aspart]  01/31/2013   Intolerance Nausea and Vomiting Current Immunizations  Reviewed on 10/15/2015 Name Date Influenza Vaccine (Quad) PF 10/3/2016, 10/15/2015, 9/11/2014 Influenza Vaccine PF 12/11/2012  1:10 PM  
 Tdap 10/15/2015 Not reviewed this visit Vitals BP Pulse Resp Height(growth percentile) Weight(growth percentile) SpO2  
 110/76 82 18 5' 2\" (1.575 m) 172 lb (78 kg) 98% BMI OB Status Smoking Status 31.46 kg/m2 Having regular periods Never Smoker BMI and BSA Data Body Mass Index Body Surface Area  
 31.46 kg/m 2 1.85 m 2 Preferred Pharmacy Pharmacy Name Phone [facility] [address] [place] [address] [phone] Your Updated Medication List  
  
   
 This list is accurate as of: 3/31/17 11:02 AM.  Always use your most recent med list.  
  
  
  
  
 AMARYL 4 mg tablet Generic drug:  glimepiride Take  by mouth two (2) times a day. bethanechol 10 mg tablet Commonly known as:  URECHOLINE Blood-Glucose Meter monitoring kit  
by Does Not Apply route two (2) times a day. butalbital-acetaminophen-caffeine -40 mg per tablet Commonly known as:  Stephen Ends Take 1 Tab by mouth every six (6) hours as needed for Pain. Max Daily Amount: 4 Tabs. ciprofloxacin HCl 250 mg tablet Commonly known as:  CIPRO Take 1 Tab by mouth two (2) times a day. fluticasone 50 mcg/actuation nasal spray Commonly known as:  [address] Longest 2 Sprays by Both Nostrils route daily as needed for Rhinitis or Allergies. furosemide 20 mg tablet Commonly known as:  LASIX Take as needed daily for leg swelling. glucose blood VI test strips strip Commonly known as:  ONETOUCH ULTRA TEST  
by Does Not Apply route. One touch ultra mini test strips HumaLOG 100 unit/mL injection Generic drug:  insulin lispro  
by SubCUTAneous route. 20-40 units with meals  
  
 losartan 100 mg tablet Commonly known as:  COZAAR  
TAKE 1 TABLET BY MOUTH DAILY pregabalin 75 mg capsule Commonly known as:  Eboni Million Take 1 Cap by mouth three (3) times daily. propranolol LA 80 mg SR capsule Commonly known as:  INDERAL LA Take 1 Cap by mouth two (2) times a day. rivaroxaban 15 mg Tab tablet Commonly known as:  Lila Honey Take 1 Tab by mouth daily. simvastatin 40 mg tablet Commonly known as:  ZOCOR Take 1 Tab by mouth nightly. SYNTHROID PO Take  by mouth. terconazole 0.4 % vaginal cream  
Commonly known as:  TERAZOL 7 Patient Instructions Referred to Sindy Collinsr [phone] [facility]! Dear Cheli Kilgore: 
Thank you for requesting a Beezikhart account.   Our records indicate that you already have an active StackBlaze account. You can access your account anytime at [email or web address] Did you know that you can access your hospital and ER discharge instructions at any time in StackBlaze? You can also review all of your test results from your hospital stay or ER visit. Additional Information If you have questions, please visit the Frequently Asked Questions section of the StackBlaze website at [email or web address] Remember, StackBlaze is NOT to be used for urgent needs. For medical emergencies, dial 911. Now available from your iPhone and Android! Please provide this summary of care documentation to your next provider. Your primary care clinician is listed as [address]. If you have any questions after today's visit, please call [phone].

## 2017-04-08 PROBLEM — G45.9 TRANSIENT CEREBRAL ISCHEMIA: Status: ACTIVE | Noted: 2017-04-08

## 2017-04-08 PROBLEM — I48.0 PAROXYSMAL ATRIAL FIBRILLATION (HCC): Status: ACTIVE | Noted: 2017-04-08

## 2017-04-08 PROBLEM — N28.9 RENAL INSUFFICIENCY: Status: ACTIVE | Noted: 2017-04-08

## 2017-04-08 PROBLEM — Z79.01 CHRONIC ANTICOAGULATION: Status: ACTIVE | Noted: 2017-04-08

## 2017-04-08 NOTE — PROGRESS NOTES
PATIENT NAME: Andrea Tam         27 y.o.      1985              DATE:3/31/2017    REASON FOR VISIT: Paroxysmal atrial fibrillation    HISTORY OF PRESENT ILLNESS: Inderal increased last visit. Denies palpitations. Xarelto begun. Denies signs of bleeding. Denies chest pain and other symptoms suggestive of angina. Denies ALLEN, PND, orthopnea. Denies palpitation, syncope, presyncope. Denies edema in the lower extremities. PAST MEDICAL HISTORY:   Past Medical History:  No date: Diabetes ([facility]  No date: Diabetic neuropathy ([facility]  No date: Diabetic retinopathy  No date: Hypercholesterolemia    PAST SURGICAL HISTORY:   Past Surgical History:  January 2013: HX ORTHOPAEDIC      Comment: right toe nail surgery Dr. Sifuentes Awais:  Social History    Marital status: UNKNOWN             Spouse name:                       Years of education:                 Number of children:               Social History Main Topics    Smoking status: Never Smoker                                                                Smokeless status: Never Used                        Alcohol use: No              Drug use: No                ALLERGIES:    -- Latex -- Hives   -- Macrobid [Nitrofurantoin Monohyd/M-Cryst] -- Diarrhea   -- Novolog Mix 70-30 [Insulin Asp Prt-Insulin Aspart] -- Nausea and Vomiting     CURRENT MEDICATIONS:   Current Outpatient Prescriptions:  propranolol LA (INDERAL LA) 80 mg SR capsule, Take 1 Cap by mouth two (2) times a day. rivaroxaban (XARELTO) 15 mg tab tablet, Take 1 Tab by mouth daily. butalbital-acetaminophen-caffeine (FIORICET, ESGIC) -40 mg per tablet, Take 1 Tab by mouth every six (6) hours as needed for Pain. Max Daily Amount: 4 Tabs. losartan (COZAAR) 100 mg tablet, TAKE 1 TABLET BY MOUTH DAILY  simvastatin (ZOCOR) 40 mg tablet, Take 1 Tab by mouth nightly. furosemide (LASIX) 20 mg tablet, Take as needed daily for leg swelling.   bethanechol (URECHOLINE) 10 mg tablet, terconazole (TERAZOL 7) 0.4 % vaginal cream,   glimepiride (AMARYL) 4 mg tablet, Take  by mouth two (2) times a day. LEVOTHYROXINE SODIUM (SYNTHROID PO), Take  by mouth. insulin lispro (HUMALOG) 100 unit/mL injection, by SubCUTAneous route. 20-40 units with meals  fluticasone (FLONASE) 50 mcg/actuation nasal spray, 2 Sprays by Both Nostrils route daily as needed for Rhinitis or Allergies. pregabalin (LYRICA) 75 mg capsule, Take 1 Cap by mouth three (3) times daily. Blood-Glucose Meter monitoring kit, by Does Not Apply route two (2) times a day. glucose blood VI test strips (ONE TOUCH ULTRA TEST) strip, by Does Not Apply route. One touch ultra mini test strips  ciprofloxacin HCl (CIPRO) 250 mg tablet, Take 1 Tab by mouth two (2) times a day. No current facility-administered medications for this visit. REVIEW of SYSTEMS:Respiratory ROS: no cough, shortness of breath, or wheezing  Cardiovascular ROS: Please see history of present illness     PHYSICAL EXAMINATION:   /76  Pulse 82  Resp 18  Ht 5' 2\" (1.575 m)  Wt 78 kg (172 lb)  SpO2 98%  BMI 31.46 kg/m2  BP Readings from Last 3 Encounters:  03/31/17 : 110/76  03/03/17 : 132/82  02/09/17 : (!) 160/100    Pulse Readings from Last 3 Encounters:  03/31/17 : 82  03/03/17 : 87  02/09/17 : 95    Wt Readings from Last 3 Encounters:  03/31/17 : 78 kg (172 lb)  03/03/17 : 79.8 kg (176 lb)  02/09/17 : 80.7 kg (178 lb)    Neck: No jugular venous distention. Carotid upstrokes 2+ without bruits. Chest: Clear to auscultation. Heart: Regular rhythm. No murmur or gallop. Extremities: No edema. IMPRESSION:   Paroxysmal atrial fibrillation, asymptomatic  Anticoagulation, uncomplicated  Renal insufficiency    PLAN:  No change in medications  Return to office in 3 months    The diagnoses and plan were discussed with patient. All questions answered. Plan of care agreed to by all concerned. Uvaldo Boyce.  MD [place]       ,

## 2017-04-17 ENCOUNTER — TELEPHONE (OUTPATIENT)
Dept: CARDIOLOGY CLINIC | Age: 32
End: 2017-04-17

## 2017-04-17 ENCOUNTER — DOCUMENTATION ONLY (OUTPATIENT)
Dept: CARDIOLOGY CLINIC | Age: 32
End: 2017-04-17

## 2017-04-17 DIAGNOSIS — N28.9 RENAL INSUFFICIENCY: Primary | ICD-10-CM

## 2017-04-17 NOTE — PROGRESS NOTES
4/17/17 - Scheduled Urology appointment for patient with Dr. Amanda Rodrigues for 4/27/17 with check in at 2:45pm. Called and spoke with patient and gave her doctors name, address and phone number with time of check in. Called patient PCP, Dr. Pinky Pinto office and [facility] referral in motion for patient to have by the time she gets to appointment on 4/27/17.

## 2017-04-20 LAB
HBA1C MFR BLD HPLC: 7.9 %
LDL-C, EXTERNAL: 165

## 2017-05-03 ENCOUNTER — OFFICE VISIT (OUTPATIENT)
Dept: UROLOGY | Age: 32
End: 2017-05-03

## 2017-05-03 VITALS
HEART RATE: 96 BPM | HEIGHT: 62 IN | OXYGEN SATURATION: 100 % | WEIGHT: 166 LBS | SYSTOLIC BLOOD PRESSURE: 181 MMHG | DIASTOLIC BLOOD PRESSURE: 109 MMHG | BODY MASS INDEX: 30.55 KG/M2

## 2017-05-03 DIAGNOSIS — N39.44 NOCTURNAL ENURESIS: ICD-10-CM

## 2017-05-03 DIAGNOSIS — R31.9 HEMATURIA: ICD-10-CM

## 2017-05-03 DIAGNOSIS — N31.2 FLACCID BLADDER: Primary | ICD-10-CM

## 2017-05-03 LAB
BILIRUB UR QL STRIP: NEGATIVE
GLUCOSE UR-MCNC: NORMAL MG/DL
KETONES P FAST UR STRIP-MCNC: NEGATIVE MG/DL
PH UR STRIP: 6.5 [PH] (ref 4.6–8)
PROT UR QL STRIP: NORMAL MG/DL
SP GR UR STRIP: 1.02 (ref 1–1.03)
UA UROBILINOGEN AMB POC: NORMAL (ref 0.2–1)
URINALYSIS CLARITY POC: CLEAR
URINALYSIS COLOR POC: YELLOW
URINE BLOOD POC: NORMAL
URINE LEUKOCYTES POC: NEGATIVE
URINE NITRITES POC: NEGATIVE

## 2017-05-03 NOTE — MR AVS SNAPSHOT
Visit Information Date & Time Provider Department Dept. Phone Encounter #  
 5/3/2017 11:00 AM Alyx Snow, [address] [place] [address] [facility] [id number] Your Appointments 5/9/2017 10:40 AM  
ROUTINE CARE with Yesi Lee MD  
[address] [place] [place] [address] ([address] [place] [address] Road) Appt Note: fu on htn; r/s from 4/25 at request of pt  
 [address] [address] 2 [address] [place] [address] [address]  
  
    
 7/6/2017 10:00 AM  
Follow Up with Antoinette Morillo MD  
[facility] [place] ([address]) Appt Note: 3 mth f/up [facility] Code [address]  
[phone] [facility]2 [facility] [address] 7/13/2017  9:20 AM  
MEDICARE EXT with Yesi Lee MD  
[address] [place] [address] ([address] [place] [address] Road) Appt Note: [id number]  
 [address] [address] 2 [address] [place] [address] [address] Upcoming Health Maintenance Date Due Pneumococcal 19-64 Medium Risk (1 of 1 - PPSV23) 4/8/2002 FOOT EXAM Q1 6/19/2017 HEMOGLOBIN A1C Q6M 7/12/2017 INFLUENZA AGE 9 TO ADULT 8/1/2017 PAP AKA CERVICAL CYTOLOGY 10/7/2017 EYE EXAM RETINAL OR DILATED Q1 11/4/2017 MICROALBUMIN Q1 1/12/2018 LIPID PANEL Q1 2/9/2018 DTaP/Tdap/Td series (2 - Td) 10/15/2025 Allergies as of 5/3/2017  Review Complete On: 5/3/2017 By: Alyx Snow MD  
  
 Severity Noted Reaction Type Reactions Latex  05/20/2010    Hives Macrobid [Nitrofurantoin Monohyd/m-cryst]  07/21/2011   Intolerance Diarrhea Novolog Mix 70-30 [Insulin Asp Prt-insulin Aspart]  01/31/2013   Intolerance Nausea and Vomiting Current Immunizations  Reviewed on 10/15/2015 Name Date Influenza Vaccine (Quad) PF 10/3/2016, 10/15/2015, 9/11/2014  Influenza Vaccine PF 12/11/2012  1:10 PM  
 Tdap 10/15/2015 Not reviewed this visit You Were Diagnosed With   
  
 Codes Comments Nocturnal enuresis    -  Primary ICD-10-CM: N39.44 ICD-9-CM: 788.36 Hematuria     ICD-10-CM: R31.9 ICD-9-CM: 599.70 Vitals BP Pulse Height(growth percentile) Weight(growth percentile) SpO2 BMI  
 (!) 181/109 (BP 1 Location: Left arm, BP Patient Position: Sitting) 96 5' 2\" (1.575 m) 166 lb (75.3 kg) 100% 30.36 kg/m2 OB Status Smoking Status Having regular periods Never Smoker Vitals History BMI and BSA Data Body Mass Index Body Surface Area  
 30.36 kg/m 2 1.81 m 2 Preferred Pharmacy Pharmacy Name Phone [facility] [address] 40 [facility] 18 [phone] Your Updated Medication List  
  
   
This list is accurate as of: 5/3/17 12:03 PM.  Always use your most recent med list.  
  
  
  
  
 AMARYL 4 mg tablet Generic drug:  glimepiride Take  by mouth two (2) times a day. bethanechol 10 mg tablet Commonly known as:  URECHOLINE Blood-Glucose Meter monitoring kit  
by Does Not Apply route two (2) times a day. butalbital-acetaminophen-caffeine -40 mg per tablet Commonly known as:  Waynetta Booze Take 1 Tab by mouth every six (6) hours as needed for Pain. Max Daily Amount: 4 Tabs. ciprofloxacin HCl 250 mg tablet Commonly known as:  CIPRO Take 1 Tab by mouth two (2) times a day. fluticasone 50 mcg/actuation nasal spray Commonly known as:  Emmette Settles 2 Sprays by Both Nostrils route daily as needed for Rhinitis or Allergies. furosemide 20 mg tablet Commonly known as:  LASIX Take as needed daily for leg swelling. glucose blood VI test strips strip Commonly known as:  ONETOUCH ULTRA TEST  
by Does Not Apply route. One touch ultra mini test strips HumaLOG 100 unit/mL injection Generic drug:  insulin lispro by SubCUTAneous route. 20-40 units with meals  
  
 losartan 100 mg tablet Commonly known as:  COZAAR  
TAKE 1 TABLET BY MOUTH DAILY pregabalin 75 mg capsule Commonly known as:  Dany Mana Take 1 Cap by mouth three (3) times daily. propranolol LA 80 mg SR capsule Commonly known as:  INDERAL LA Take 1 Cap by mouth two (2) times a day. rivaroxaban 15 mg Tab tablet Commonly known as:  Merna Kinjal Take 1 Tab by mouth daily. simvastatin 40 mg tablet Commonly known as:  ZOCOR Take 1 Tab by mouth nightly. SYNTHROID PO Take  by mouth. terconazole 0.4 % vaginal cream  
Commonly known as:  TERAZOL 7 We Performed the Following AMB POC URINALYSIS DIP STICK AUTO W/O MICRO [77454 CPT(R)] To-Do List   
 05/03/2017 Imaging:  CT UROGRAM W WO CONT Patient Instructions Blood in the Urine: Care Instructions Your Care Instructions Blood in the urine, or hematuria, may make the urine look red, brown, or pink. There may be blood every time you urinate or just from time to time. You cannot always see blood in the urine, but it will show up in a urine test. 
Blood in the urine may be serious. It should always be checked by a doctor. Your doctor may recommend more tests, including an X-ray, a CT scan, or a cystoscopy (which lets a doctor look inside the urethra and bladder). Blood in the urine can be a sign of another problem. Common causes are bladder infections and kidney stones. An injury to your groin or your genital area can also cause bleeding in the urinary tract. Very hard exercisesuch as running a marathoncan cause blood in the urine. Blood in the urine can also be a sign of kidney disease or cancer in the bladder or kidney. Many cases of blood in the urine are caused by a harmless condition that runs in families. This is called benign familial hematuria. It does not need any treatment. Sometimes your urine may look red or brown even though it does not contain blood. For example, not getting enough fluids (dehydration), taking certain medicines, or having a liver problem can change the color of your urine. Eating foods such as beets, rhubarb, or blackberries or foods with red food coloring can make your urine look red or pink. Follow-up care is a key part of your treatment and safety. Be sure to make and go to all appointments, and call your doctor if you are having problems. It's also a good idea to know your test results and keep a list of the medicines you take. When should you call for help? Call your doctor now or seek immediate medical care if: 
· You have symptoms of a urinary infection. For example: ¨ You have pus in your urine. ¨ You have pain in your back just below your rib cage. This is called flank pain. ¨ You have a fever, chills, or body aches. ¨ It hurts to urinate. ¨ You have groin or belly pain. · You have more blood in your urine. Watch closely for changes in your health, and be sure to contact your doctor if: 
· You have new urination problems. · You do not get better as expected. Where can you learn more? Go to http://aram-klaus.info/. Enter Z360 in the search box to learn more about \"Blood in the Urine: Care Instructions. \" Current as of: August 12, 2016 Content Version: 11.2 © 7436-1793 Attorney[employer]ee. Care instructions adapted under license by [employer] (which disclaims liability or warranty for this information). If you have questions about a medical condition or this instruction, always ask your healthcare professional. [address] any warranty or liability for your use of this information. Introducing [facility]! Dear Greg Kwan: 
Thank you for requesting a Gem account. Our records indicate that you already have an active Gem account.   You can access your account anytime at [email or web address] Did you know that you can access your hospital and ER discharge instructions at any time in Prospectvision? You can also review all of your test results from your hospital stay or ER visit. Additional Information If you have questions, please visit the Frequently Asked Questions section of the Prospectvision website at [email or web address] Remember, Prospectvision is NOT to be used for urgent needs. For medical emergencies, dial 911. Now available from your iPhone and Android! Please provide this summary of care documentation to your next provider. Your primary care clinician is listed as [address]. If you have any questions after today's visit, please call [phone].

## 2017-05-03 NOTE — PATIENT INSTRUCTIONS

## 2017-05-03 NOTE — PROGRESS NOTES
Ms. Phill Dave has a reminder for a \"due or due soon\" health maintenance. I have asked that she contact her primary care provider for follow-up on this health maintenance.

## 2017-05-04 NOTE — PROGRESS NOTES
Marguerite Toone 27 y.o. female     Ms. Phill Dave seen today for evaluation of a complaint of urine having a foul odor during the past few months associated with urgency frequency or dysuria  No flank pain or gross hematuria  Patient has no history of urinary tract disease, trauma, or surgery  Previous evaluation by urogynecology-Dr. 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 coli UTI 2014    Review of Systems:   CNS: Right hemisphere CVA/left hemiparesis/frequent headaches  Respiratory: No cough wheezing or shortness of breath  Cardiovascular: Atrial fibrillation Xarelto anticoagulation/no chest pain   Intestinal: No dyspepsia diarrhea/+ constipation  Urinary: Urinary urgency and frequency  Skeletal: No bone or joint pain  Endocrine: Diabetes  Other:    Allergies: Allergies   Allergen Reactions    Latex Hives    Macrobid [Nitrofurantoin Monohyd/M-Cryst] Diarrhea    Novolog Mix 70-30 [Insulin Asp Prt-Insulin Aspart] Nausea and Vomiting      Medications:    Current Outpatient Prescriptions   Medication Sig Dispense Refill    propranolol LA (INDERAL LA) 80 mg SR capsule Take 1 Cap by mouth two (2) times a day. 60 Cap 11    rivaroxaban (XARELTO) 15 mg tab tablet Take 1 Tab by mouth daily. 30 Tab 11    simvastatin (ZOCOR) 40 mg tablet Take 1 Tab by mouth nightly. 30 Tab 3    insulin lispro (HUMALOG) 100 unit/mL injection by SubCUTAneous route. 20-40 units with meals      pregabalin (LYRICA) 75 mg capsule Take 1 Cap by mouth three (3) times daily. 90 Cap 3    Blood-Glucose Meter monitoring kit by Does Not Apply route two (2) times a day.  1 Kit 0    glucose blood VI test strips (ONE TOUCH ULTRA TEST) strip by Does Not Apply route. One touch ultra mini test strips 1 Package 11    ciprofloxacin HCl (CIPRO) 250 mg tablet Take 1 Tab by mouth two (2) times a day. 20 Tab 0    butalbital-acetaminophen-caffeine (FIORICET, ESGIC) -40 mg per tablet Take 1 Tab by mouth every six (6) hours as needed for Pain. Max Daily Amount: 4 Tabs. 30 Tab 0    losartan (COZAAR) 100 mg tablet TAKE 1 TABLET BY MOUTH DAILY 90 Tab 0    furosemide (LASIX) 20 mg tablet Take as needed daily for leg swelling. 30 Tab 3    bethanechol (URECHOLINE) 10 mg tablet       terconazole (TERAZOL 7) 0.4 % vaginal cream   11    glimepiride (AMARYL) 4 mg tablet Take  by mouth two (2) times a day.  LEVOTHYROXINE SODIUM (SYNTHROID PO) Take  by mouth.  fluticasone (FLONASE) 50 mcg/actuation nasal spray 2 Sprays by Both Nostrils route daily as needed for Rhinitis or Allergies. 1 Bottle 3       Past Medical History:   Diagnosis Date    Cerebral artery occlusion with cerebral infarction ([address]     [facility]     Diabetic neuropathy [facility]     Diabetic retinopathy     Hypercholesterolemia     Hypertension       Past Surgical History:   Procedure Laterality Date    HX ORTHOPAEDIC  January 2013    right toe nail surgery Dr. Viviana Roy     Family History   Problem Relation Age of Onset    Diabetes Mother     Cancer Paternal Grandmother         Physical Examination: Well-nourished mature female in no apparent distress    Abdomen is nontender no palpable masses   Back-no percussion CVA tenderness on either side   Lower extremity motor and sensory function are normal-no deficits     urinalysis: ++heme/ +++pro/negative nitrite    Impression: History of flaccid bladder (diabetes)                       -Microscopic hematuria    Plan: Cystoscopy with gross EMG        Sherlyn Arana MD  -electronically signed-    PLEASE NOTE:  This document has been produced using voice recognition software.  Unrecognized errors in transcription may be present.

## 2017-05-09 ENCOUNTER — OFFICE VISIT (OUTPATIENT)
Dept: FAMILY MEDICINE CLINIC | Age: 32
End: 2017-05-09

## 2017-05-09 VITALS
HEIGHT: 62 IN | OXYGEN SATURATION: 97 % | BODY MASS INDEX: 30.73 KG/M2 | RESPIRATION RATE: 16 BRPM | HEART RATE: 92 BPM | WEIGHT: 167 LBS | SYSTOLIC BLOOD PRESSURE: 130 MMHG | DIASTOLIC BLOOD PRESSURE: 82 MMHG | TEMPERATURE: 98.4 F

## 2017-05-09 DIAGNOSIS — J06.9 ACUTE URI: Primary | ICD-10-CM

## 2017-05-09 DIAGNOSIS — I10 ESSENTIAL HYPERTENSION: ICD-10-CM

## 2017-05-09 RX ORDER — AMOXICILLIN AND CLAVULANATE POTASSIUM 875; 125 MG/1; MG/1
1 TABLET, FILM COATED ORAL EVERY 12 HOURS
Qty: 20 TAB | Refills: 0 | Status: SHIPPED | OUTPATIENT
Start: 2017-05-09 | End: 2017-05-19

## 2017-05-09 RX ORDER — FLUTICASONE PROPIONATE 50 MCG
2 SPRAY, SUSPENSION (ML) NASAL
Qty: 1 BOTTLE | Refills: 3 | Status: SHIPPED | OUTPATIENT
Start: 2017-05-09 | End: 2017-05-09 | Stop reason: SDUPTHER

## 2017-05-09 NOTE — PATIENT INSTRUCTIONS

## 2017-05-09 NOTE — MR AVS SNAPSHOT
Visit Information Date & Time Provider Department Dept. Phone Encounter #  
 5/9/2017 10:40 AM Rubén Christianson MD [address] [id number] Follow-up Instructions Return in about 4 months (around 9/9/2017) for HTN/chol. Your Appointments 5/17/2017 10:45 AM  
PROCEDURE with Ingrid Phelps MD  
[facility]: cysto after CT scan [address] [place] [address] [address]  
[phone] [address] [id number]  
  
    
 7/6/2017 10:00 AM  
Follow Up with Yani Mensah MD  
[facility] ([facility]) Appt Note: 3 mth f/up [place] [address] [address] [address]  
[phone] [address] [place] [address] [address] [facility] [address] 7/13/2017  9:20 AM  
MEDICARE EXT with Rubén Christianson MD  
[address] [place] [address] ([facility]) Appt Note: [id number]  
 [address] [address] [address] [place] [address] [address] Upcoming Health Maintenance Date Due Pneumococcal 19-64 Medium Risk (1 of 1 - PPSV23) 9/21/2017* FOOT EXAM Q1 6/19/2017 HEMOGLOBIN A1C Q6M 7/12/2017 INFLUENZA AGE 9 TO ADULT 8/1/2017 PAP AKA CERVICAL CYTOLOGY 10/7/2017 EYE EXAM RETINAL OR DILATED Q1 11/4/2017 MICROALBUMIN Q1 1/12/2018 LIPID PANEL Q1 2/9/2018 DTaP/Tdap/Td series (2 - Td) 10/15/2025 *Topic was postponed. The date shown is not the original due date. Allergies as of 5/9/2017  Review Complete On: 5/9/2017 By: Jim Karimi LPN Severity Noted Reaction Type Reactions Latex  05/20/2010    Hives Macrobid [Nitrofurantoin Monohyd/m-cryst]  07/21/2011   Intolerance Diarrhea Novolog Mix 70-30 [Insulin Asp Prt-insulin Aspart]  01/31/2013   Intolerance Nausea and Vomiting Current Immunizations  Reviewed on 10/15/2015 Name Date Influenza Vaccine (Quad) PF 10/3/2016, 10/15/2015, 9/11/2014 Influenza Vaccine PF 12/11/2012  1:10 PM  
 Tdap 10/15/2015 Not reviewed this visit You Were Diagnosed With   
  
 Codes Comments Acute URI    -  Primary ICD-10-CM: J06.9 ICD-9-CM: 465.9 Essential hypertension     ICD-10-CM: I10 
ICD-9-CM: 401.9 Vitals BP Pulse Temp Resp Height(growth percentile) Weight(growth percentile) 130/82 92 98.4 °F (36.9 °C) (Oral) 16 5' 2\" (1.575 m) 167 lb (75.8 kg) LMP SpO2 BMI OB Status Smoking Status 04/20/2017 97% 30.54 kg/m2 Having regular periods Never Smoker Vitals History BMI and BSA Data Body Mass Index Body Surface Area 30.54 kg/m 2 1.82 m 2 Preferred Pharmacy Pharmacy Name Phone [facility] [address] [place] [address] [phone] Your Updated Medication List  
  
   
This list is accurate as of: 5/9/17 11:27 AM.  Always use your most recent med list.  
  
  
  
  
 AMARYL 4 mg tablet Generic drug:  glimepiride Take  by mouth two (2) times a day. amoxicillin-clavulanate 875-125 mg per tablet Commonly known as:  AUGMENTIN Take 1 Tab by mouth every twelve (12) hours for 10 days. Blood-Glucose Meter monitoring kit  
by Does Not Apply route two (2) times a day. butalbital-acetaminophen-caffeine -40 mg per tablet Commonly known as:  Hilda Aver Take 1 Tab by mouth every six (6) hours as needed for Pain. Max Daily Amount: 4 Tabs. fluticasone 50 mcg/actuation nasal spray Commonly known as:  Mitzi Rich 2 Sprays by Both Nostrils route daily as needed for Rhinitis or Allergies. furosemide 20 mg tablet Commonly known as:  LASIX Take as needed daily for leg swelling. glucose blood VI test strips strip Commonly known as:  ONETOUCH ULTRA TEST  
 by Does Not Apply route. One touch ultra mini test strips HumaLOG 100 unit/mL injection Generic drug:  insulin lispro  
by SubCUTAneous route. 20-40 units with meals  
  
 losartan 100 mg tablet Commonly known as:  COZAAR  
TAKE 1 TABLET BY MOUTH DAILY pregabalin 75 mg capsule Commonly known as:  Taryn Silvan Take 1 Cap by mouth three (3) times daily. propranolol LA 80 mg SR capsule Commonly known as:  INDERAL LA Take 1 Cap by mouth two (2) times a day. rivaroxaban 15 mg Tab tablet Commonly known as:  Brooke Chroman Take 1 Tab by mouth daily. simvastatin 40 mg tablet Commonly known as:  ZOCOR Take 1 Tab by mouth nightly. SYNTHROID PO Take  by mouth. Prescriptions Sent to Pharmacy Refills  
 fluticasone (FLONASE) 50 mcg/actuation nasal spray 3 Si Sprays by Both Nostrils route daily as needed for Rhinitis or Allergies. Class: Normal  
 Pharmacy: [facility] [address] [place] [address] [phone] Ph #: [phone] Route: Both Nostrils  
 amoxicillin-clavulanate (AUGMENTIN) 875-125 mg per tablet 0 Sig: Take 1 Tab by mouth every twelve (12) hours for 10 days. Class: Normal  
 Pharmacy: [facility] [address] [place] [address] [phone] Ph #: [phone] Route: Oral  
  
Follow-up Instructions Return in about 4 months (around 2017) for HTN/chol. Patient Instructions DASH Diet: Care Instructions Your Care Instructions The DASH diet is an eating plan that can help lower your blood pressure. DASH stands for Dietary Approaches to Stop Hypertension. Hypertension is high blood pressure. The DASH diet focuses on eating foods that are high in calcium, potassium, and magnesium. These nutrients can lower blood pressure.  The foods that are highest in these nutrients are fruits, vegetables, low-fat dairy products, nuts, seeds, and legumes. But taking calcium, potassium, and magnesium supplements instead of eating foods that are high in those nutrients does not have the same effect. The DASH diet also includes whole grains, fish, and poultry. The DASH diet is one of several lifestyle changes your doctor may recommend to lower your high blood pressure. Your doctor may also want you to decrease the amount of sodium in your diet. Lowering sodium while following the DASH diet can lower blood pressure even further than just the DASH diet alone. Follow-up care is a key part of your treatment and safety. Be sure to make and go to all appointments, and call your doctor if you are having problems. It's also a good idea to know your test results and keep a list of the medicines you take. How can you care for yourself at home? Following the DASH diet · Eat 4 to 5 servings of fruit each day. A serving is 1 medium-sized piece of fruit, ½ cup chopped or canned fruit, 1/4 cup dried fruit, or 4 ounces (½ cup) of fruit juice. Choose fruit more often than fruit juice. · Eat 4 to 5 servings of vegetables each day. A serving is 1 cup of lettuce or raw leafy vegetables, ½ cup of chopped or cooked vegetables, or 4 ounces (½ cup) of vegetable juice. Choose vegetables more often than vegetable juice. · Get 2 to 3 servings of low-fat and fat-free dairy each day. A serving is 8 ounces of milk, 1 cup of yogurt, or 1 ½ ounces of cheese. · Eat 6 to 8 servings of grains each day. A serving is 1 slice of bread, 1 ounce of dry cereal, or ½ cup of cooked rice, pasta, or cooked cereal. Try to choose whole-grain products as much as possible. · Limit lean meat, poultry, and fish to 2 servings each day. A serving is 3 ounces, about the size of a deck of cards. · Eat 4 to 5 servings of nuts, seeds, and legumes (cooked dried beans, lentils, and split peas) each week.  A serving is 1/3 cup of nuts, 2 tablespoons of seeds, or ½ cup of cooked beans or peas. · Limit fats and oils to 2 to 3 servings each day. A serving is 1 teaspoon of vegetable oil or 2 tablespoons of salad dressing. · Limit sweets and added sugars to 5 servings or less a week. A serving is 1 tablespoon jelly or jam, ½ cup sorbet, or 1 cup of lemonade. · Eat less than 2,300 milligrams (mg) of sodium a day. If you limit your sodium to 1,500 mg a day, you can lower your blood pressure even more. Tips for success · Start small. Do not try to make dramatic changes to your diet all at once. You might feel that you are missing out on your favorite foods and then be more likely to not follow the plan. Make small changes, and stick with them. Once those changes become habit, add a few more changes. · Try some of the following: ¨ Make it a goal to eat a fruit or vegetable at every meal and at snacks. This will make it easy to get the recommended amount of fruits and vegetables each day. ¨ Try yogurt topped with fruit and nuts for a snack or healthy dessert. ¨ Add lettuce, tomato, cucumber, and onion to sandwiches. ¨ Combine a ready-made pizza crust with low-fat mozzarella cheese and lots of vegetable toppings. Try using tomatoes, squash, spinach, broccoli, carrots, cauliflower, and onions. ¨ Have a variety of cut-up vegetables with a low-fat dip as an appetizer instead of chips and dip. ¨ Sprinkle sunflower seeds or chopped almonds over salads. Or try adding chopped walnuts or almonds to cooked vegetables. ¨ Try some vegetarian meals using beans and peas. Add garbanzo or kidney beans to salads. Make burritos and tacos with mashed mixon beans or black beans. Where can you learn more? Go to http://aram-klaus.info/. Enter W698 in the search box to learn more about \"DASH Diet: Care Instructions. \" Current as of: March 23, 2016 Content Version: 11.2 © 5069-7234 [employer].  Care instructions adapted under license by [address] (which disclaims liability or warranty for this information). If you have questions about a medical condition or this instruction, always ask your healthcare professional. [address] any warranty or liability for your use of this information. Introducing [facility]! Dear Corinne Ash: 
Thank you for requesting a [email or web address] account. Our records indicate that you already have an active [email or web address] account. You can access your account anytime at [email or web address]. [email or web address] Did you know that you can access your hospital and ER discharge instructions at any time in [email or web address]? You can also review all of your test results from your hospital stay or ER visit. Additional Information If you have questions, please visit the Frequently Asked Questions section of the [email or web address] website at [email or web address] Remember, [email or web address] is NOT to be used for urgent needs. For medical emergencies, dial 911. Now available from your iPhone and Android! Please provide this summary of care documentation to your next provider. Your primary care clinician is listed as [address]. If you have any questions after today's visit, please call [phone].

## 2017-05-09 NOTE — PROGRESS NOTES
HISTORY OF PRESENT ILLNESS  Wilian Valencia is a 27 y.o. female. HPI  Patient is today for evaluation and treatment of: Nasal Congestion / Hypertension    Nasal Congestion: she has had HA, watery eyes, sneezing and rhinnorhea that has been clear; There is no cough or fever;  + sick contacts. has tried robitussin this has helped some. Hypertension: initial BP is elevated;  Better at [facility] check;  Bps have been about the same at other MD offices. Hypercholesterolemia:  Pts last labs were elevated here. She states she has since had further lab work at endocrine in April and her cholesterol and A1c has gotten better. She had also lost weight at her last endocrine appt. Will await correspondence with respect to adjusting med for chol. Current Outpatient Prescriptions:     propranolol LA (INDERAL LA) 80 mg SR capsule, Take 1 Cap by mouth two (2) times a day., Disp: 60 Cap, Rfl: 11    rivaroxaban (XARELTO) 15 mg tab tablet, Take 1 Tab by mouth daily. , Disp: 30 Tab, Rfl: 11    butalbital-acetaminophen-caffeine (FIORICET, ESGIC) -40 mg per tablet, Take 1 Tab by mouth every six (6) hours as needed for Pain. Max Daily Amount: 4 Tabs., Disp: 30 Tab, Rfl: 0    losartan (COZAAR) 100 mg tablet, TAKE 1 TABLET BY MOUTH DAILY, Disp: 90 Tab, Rfl: 0    simvastatin (ZOCOR) 40 mg tablet, Take 1 Tab by mouth nightly., Disp: 30 Tab, Rfl: 3    furosemide (LASIX) 20 mg tablet, Take as needed daily for leg swelling., Disp: 30 Tab, Rfl: 3    glimepiride (AMARYL) 4 mg tablet, Take  by mouth two (2) times a day., Disp: , Rfl:     LEVOTHYROXINE SODIUM (SYNTHROID PO), Take  by mouth., Disp: , Rfl:     insulin lispro (HUMALOG) 100 unit/mL injection, by SubCUTAneous route. 20-40 units with meals, Disp: , Rfl:     fluticasone (FLONASE) 50 mcg/actuation nasal spray, 2 Sprays by Both Nostrils route daily as needed for Rhinitis or Allergies. , Disp: 1 Bottle, Rfl: 3    pregabalin (LYRICA) 75 mg capsule, Take 1 Cap by mouth three (3) times daily. , Disp: 90 Cap, Rfl: 3    Blood-Glucose Meter monitoring kit, by Does Not Apply route two (2) times a day., Disp: 1 Kit, Rfl: 0    glucose blood VI test strips (ONE TOUCH ULTRA TEST) strip, by Does Not Apply route. One touch ultra mini test strips, Disp: 1 Package, Rfl: 11    PMH,  Meds, Allergies, Family History, Social history reviewed    Review of Systems   Constitutional: Negative for chills and fever. HENT: Negative for sore throat. Respiratory: Negative for cough, shortness of breath and wheezing. Cardiovascular: Negative for chest pain and leg swelling. Physical Exam   Constitutional: She appears well-developed and well-nourished. No distress. HENT:   Head: Normocephalic and atraumatic. Right Ear: Tympanic membrane normal.   Left Ear: Tympanic membrane normal.   Nose: Mucosal edema and rhinorrhea present. Right sinus exhibits no maxillary sinus tenderness and no frontal sinus tenderness. Left sinus exhibits maxillary sinus tenderness and frontal sinus tenderness. Mouth/Throat: No oropharyngeal exudate or posterior oropharyngeal edema. Cardiovascular: Normal rate and regular rhythm. Exam reveals no gallop and no friction rub. No murmur heard. Pulmonary/Chest: Breath sounds normal. No respiratory distress. She has no wheezes. She has no rales. Nursing note and vitals reviewed. Visit Vitals    /82    Pulse 92    Temp 98.4 °F (36.9 °C) (Oral)    Resp 16    Ht 5' 2\" (1.575 m)    Wt 167 lb (75.8 kg)    LMP 04/20/2017    SpO2 97%    BMI 30.54 kg/m2         ASSESSMENT and PLAN    ICD-10-CM ICD-9-CM    1. Acute URI J06.9 465.9    2.  Essential hypertension I10 401.9        As above, BP better at second check; #1 new   treatment plan as listed below  Orders Placed This Encounter    fluticasone (FLONASE) 50 mcg/actuation nasal spray    amoxicillin-clavulanate (AUGMENTIN) 875-125 mg per tablet     Plenty of liquids  Follow-up Disposition:  Return in about 4 months (around 9/9/2017) for HTN/chol. ( pt to come back in July as scheduled for her well exam)  An After Visit Summary was printed and given to the patient. This has been fully explained to the patient, who indicates understanding.

## 2017-05-12 RX ORDER — FLUTICASONE PROPIONATE 50 MCG
SPRAY, SUSPENSION (ML) NASAL
Qty: 1 BOTTLE | Refills: 3 | Status: SHIPPED | OUTPATIENT
Start: 2017-05-12 | End: 2018-04-27 | Stop reason: ALTCHOICE

## 2017-05-16 ENCOUNTER — HOSPITAL ENCOUNTER (OUTPATIENT)
Dept: CT IMAGING | Age: 32
Discharge: HOME | End: 2017-05-16
Attending: UROLOGY

## 2017-05-16 DIAGNOSIS — R31.9 HEMATURIA: ICD-10-CM

## 2017-05-17 DIAGNOSIS — R31.29 MICROSCOPIC HEMATURIA: Primary | ICD-10-CM

## 2017-05-17 NOTE — PROGRESS NOTES
RBV per Dr. Yazan Orellana an order was placed for patient to have a CT ABD and PELV w/o Contrast done prior to next appointment.

## 2017-05-25 ENCOUNTER — HOSPITAL ENCOUNTER (OUTPATIENT)
Dept: CT IMAGING | Age: 32
Discharge: HOME | End: 2017-05-25
Attending: UROLOGY
Payer: MEDICARE

## 2017-05-25 DIAGNOSIS — R31.29 MICROSCOPIC HEMATURIA: ICD-10-CM

## 2017-05-25 PROCEDURE — 74176 CT ABD & PELVIS W/O CONTRAST: CPT

## 2017-06-06 ENCOUNTER — OFFICE VISIT (OUTPATIENT)
Dept: UROLOGY | Age: 32
End: 2017-06-06

## 2017-06-06 VITALS
TEMPERATURE: 98.3 F | OXYGEN SATURATION: 100 % | DIASTOLIC BLOOD PRESSURE: 103 MMHG | BODY MASS INDEX: 30.55 KG/M2 | HEIGHT: 62 IN | SYSTOLIC BLOOD PRESSURE: 155 MMHG | HEART RATE: 104 BPM | WEIGHT: 166 LBS

## 2017-06-06 DIAGNOSIS — R31.29 MICROSCOPIC HEMATURIA: Primary | ICD-10-CM

## 2017-06-06 DIAGNOSIS — N31.2 FLACCID BLADDER: ICD-10-CM

## 2017-06-06 LAB
BILIRUB UR QL STRIP: NEGATIVE
GLUCOSE UR-MCNC: NORMAL MG/DL
KETONES P FAST UR STRIP-MCNC: NEGATIVE MG/DL
PH UR STRIP: 7 [PH] (ref 4.6–8)
PROT UR QL STRIP: NORMAL MG/DL
SP GR UR STRIP: 1.02 (ref 1–1.03)
UA UROBILINOGEN AMB POC: NORMAL (ref 0.2–1)
URINALYSIS CLARITY POC: CLEAR
URINALYSIS COLOR POC: YELLOW
URINE BLOOD POC: NORMAL
URINE LEUKOCYTES POC: NEGATIVE
URINE NITRITES POC: NEGATIVE

## 2017-06-06 NOTE — PATIENT INSTRUCTIONS
Cystoscopy: Care Instructions  Your Care Instructions  Cystoscopy is a test. It uses a thin, lighted tube called a cystoscope to see the inside of the bladder and the urethra. The urethra is the tube that carries urine out of the body. This test is helpful because it lets your doctor see areas of your bladder and urethra that are hard to see on X-rays. It can help your doctor find bladder stones, tumors, bleeding, and infection. During this test, your doctor also can take tissue and urine samples. And if your doctor finds small stones or growths, he or she can remove them. In most cases the scope is in the bladder for less than 10 minutes. But the entire test may take 45 minutes or longer. You will probably get local anesthesia. This numbs a small part of your body. Or you may get spinal anesthesia, which numbs more of your body. Once in a while, doctors use general anesthesia. It makes you sleep during surgery. If you get a local anesthetic, you may be able to get up right after the test. But if you had spinal or general anesthesia, you will stay in the recovery room until you are able to walk or you have feeling again in your lower body. This usually takes about an hour. Your doctor may be able to tell you some of the results right after the test. But the complete results may take several days. Follow-up care is a key part of your treatment and safety. Be sure to make and go to all appointments, and call your doctor if you are having problems. It's also a good idea to know your test results and keep a list of the medicines you take. How can you care for yourself at home? Before the test  · If you are having a local anesthetic, you can eat and drink before the test.  · If you are having a spinal or general anesthetic, do not eat or drink anything for at least 8 hours before the test. Tell your doctor what medicines you take.   · If you are not staying overnight in the hospital, make sure you have someone who can drive you home after the test.  After the test  · If your doctor prescribed antibiotics, take them as directed. Do not stop taking them just because you feel better. You need to take the full course of antibiotics. · You may have some burning when you urinate for a day or two after the test. You may feel better if you drink more fluids. This may also help prevent an infection. · Your urine may have a pinkish color for a few days after the test.  When should you call for help? Call your doctor now or seek immediate medical care if:  · Your urine is still red or you see blood clots after you have urinated several times. · You cannot pass urine 8 hours after the test.  · You get a fever or chills. · You have pain in your belly or your back just below your rib cage. This is also called flank pain. Watch closely for changes in your health, and be sure to contact your doctor if:  · You have pain or burning when you urinate. A burning sensation is normal for a day or two after the test. But call if it does not get better. · You have a frequent urge to urinate but can pass only small amounts of urine. · Your urine is pink, red, or cloudy or smells bad. It is normal for the urine to have a pinkish color for a few days after the test. But call if it does not get better. · You do not get better as expected. Where can you learn more? Go to http://aram-klaus.info/. Enter S629 in the search box to learn more about \"Cystoscopy: Care Instructions. \"  Current as of: August 12, 2016  Content Version: 11.2  © 9212-0879 [employer]. Care instructions adapted under license by [employer] (which disclaims liability or warranty for this information). If you have questions about a medical condition or this instruction, always ask your healthcare professional. [address] any warranty or liability for your use of this information.

## 2017-06-06 NOTE — PROGRESS NOTES
Ms. Alton Jenkins has a reminder for a \"due or due soon\" health maintenance. I have asked that she contact her primary care provider for follow-up on this health maintenance. [facility]  OFFICE PROCEDURE PROGRESS NOTE        Chart reviewed for the following:   IMehreen LPN, have reviewed the History, Physical and updated the Allergic reactions for [address] performed immediately prior to start of procedure:   Rebeca Meneses LPN, have performed the following reviews on Corey Brennan prior to the start of the procedure:            * Patient was identified by name and date of birth   * Agreement on procedure being performed was verified  * Risks and Benefits explained to the patient  * Procedure site verified and marked as necessary  * Patient was positioned for comfort  * Consent was signed and verified     Time: 15:40      Date of procedure: 6/6/2017    Procedure performed by:  Ning Campbell MD    Provider assisted by: Lily Govea LPN    Patient assisted by: self    How tolerated by patient: tolerated the procedure well with no complications    Post Procedural Pain Scale: 0 - No Hurt    Comments: Patient verbalized understanding of procedure and post procedure instructions.

## 2017-06-06 NOTE — MR AVS SNAPSHOT
Visit Information Date & Time Provider Department Dept. Phone Encounter #  
 6/6/2017  2:45 PM Nadia Emanuel, [facility] [phone] Your Appointments 7/6/2017 10:00 AM  
Follow Up with Taryn Austin MD  
[facility] ([address]) Appt Note: 3 mth f/up [place] [address] [address] [address] [phone] [address] [place] [address] [address] 7/13/2017  9:20 AM  
MEDICARE EXT with Krystin Cross MD  
975 [facility] ([address]) Appt Note: [id number]  
 [address] [address] [address] [place] [address] [address] Upcoming Health Maintenance Date Due  
 FOOT EXAM Q1 6/19/2017 Pneumococcal 19-64 Medium Risk (1 of 1 - PPSV23) 9/21/2017* HEMOGLOBIN A1C Q6M 7/12/2017 INFLUENZA AGE 9 TO ADULT 8/1/2017 PAP AKA CERVICAL CYTOLOGY 10/7/2017 EYE EXAM RETINAL OR DILATED Q1 11/4/2017 MICROALBUMIN Q1 1/12/2018 LIPID PANEL Q1 2/9/2018 DTaP/Tdap/Td series (2 - Td) 10/15/2025 *Topic was postponed. The date shown is not the original due date. Allergies as of 6/6/2017  Review Complete On: 6/6/2017 By: Antonio Estrella LPN Severity Noted Reaction Type Reactions Latex  05/20/2010    Hives Contrast Agent [Iodine]  05/17/2017    Other (comments) \"Burning with shooting pain\" Macrobid [Nitrofurantoin Monohyd/m-cryst]  07/21/2011   Intolerance Diarrhea Novolog Mix 70-30 [Insulin Asp Prt-insulin Aspart]  01/31/2013   Intolerance Nausea and Vomiting Current Immunizations  Reviewed on 10/15/2015 Name Date Influenza Vaccine (Quad) PF 10/3/2016, 10/15/2015, 9/11/2014 Influenza Vaccine PF 12/11/2012  1:10 PM  
 Tdap 10/15/2015 Not reviewed this visit You Were Diagnosed With   
  
 Codes Comments Microscopic hematuria    -  Primary ICD-10-CM: R31.29 ICD-9-CM: 599.72 Vitals BP Pulse Temp Height(growth percentile) Weight(growth percentile) LMP  
 (!) 155/103 (BP 1 Location: Left arm, BP Patient Position: Sitting) (!) 104 98.3 °F (36.8 °C) 5' 2\" (1.575 m) 166 lb (75.3 kg) 04/20/2017 SpO2 BMI OB Status Smoking Status 100% 30.36 kg/m2 Having regular periods Never Smoker Vitals History BMI and BSA Data Body Mass Index Body Surface Area  
 30.36 kg/m 2 1.81 m 2 Preferred Pharmacy Pharmacy Name Phone [facility] [address] [place] [address] [phone] Your Updated Medication List  
  
   
This list is accurate as of: 6/6/17  3:55 PM.  Always use your most recent med list.  
  
  
  
  
 AMARYL 4 mg tablet Generic drug:  glimepiride Take  by mouth two (2) times a day. Blood-Glucose Meter monitoring kit  
by Does Not Apply route two (2) times a day. butalbital-acetaminophen-caffeine -40 mg per tablet Commonly known as:  Sandra Palomaresrset Take 1 Tab by mouth every six (6) hours as needed for Pain. Max Daily Amount: 4 Tabs. fluticasone 50 mcg/actuation nasal spray Commonly known as:  Ala Lips SHAKE LIQUID AND USE 2 SPRAYS IN EACH NOSTRIL DAILY AS NEEDED FOR RHINITIS OR ALLERGIES  
  
 furosemide 20 mg tablet Commonly known as:  LASIX Take as needed daily for leg swelling. glucose blood VI test strips strip Commonly known as:  ONETOUCH ULTRA TEST  
by Does Not Apply route. One touch ultra mini test strips HumaLOG 100 unit/mL injection Generic drug:  insulin lispro  
by SubCUTAneous route. 20-40 units with meals  
  
 losartan 100 mg tablet Commonly known as:  COZAAR  
TAKE 1 TABLET BY MOUTH DAILY pregabalin 75 mg capsule Commonly known as:  Sabrina Enamorado Take 1 Cap by mouth three (3) times daily. propranolol LA 80 mg SR capsule Commonly known as:  INDERAL LA Take 1 Cap by mouth two (2) times a day. rivaroxaban 15 mg Tab tablet Commonly known as:  Jovana Rao Take 1 Tab by mouth daily. simvastatin 40 mg tablet Commonly known as:  ZOCOR Take 1 Tab by mouth nightly. SYNTHROID PO Take  by mouth. We Performed the Following AMB POC URINALYSIS DIP STICK AUTO W/O MICRO [04929 CPT(R)] Patient Instructions Cystoscopy: Care Instructions Your Care Instructions Cystoscopy is a test. It uses a thin, lighted tube called a cystoscope to see the inside of the bladder and the urethra. The urethra is the tube that carries urine out of the body. This test is helpful because it lets your doctor see areas of your bladder and urethra that are hard to see on X-rays. It can help your doctor find bladder stones, tumors, bleeding, and infection. During this test, your doctor also can take tissue and urine samples. And if your doctor finds small stones or growths, he or she can remove them. In most cases the scope is in the bladder for less than 10 minutes. But the entire test may take 45 minutes or longer. You will probably get local anesthesia. This numbs a small part of your body. Or you may get spinal anesthesia, which numbs more of your body. Once in a while, doctors use general anesthesia. It makes you sleep during surgery. If you get a local anesthetic, you may be able to get up right after the test. But if you had spinal or general anesthesia, you will stay in the recovery room until you are able to walk or you have feeling again in your lower body. This usually takes about an hour. Your doctor may be able to tell you some of the results right after the test. But the complete results may take several days. Follow-up care is a key part of your treatment and safety.  Be sure to make and go to all appointments, and call your doctor if you are having problems. It's also a good idea to know your test results and keep a list of the medicines you take. How can you care for yourself at home? Before the test 
· If you are having a local anesthetic, you can eat and drink before the test. 
· If you are having a spinal or general anesthetic, do not eat or drink anything for at least 8 hours before the test. Tell your doctor what medicines you take. · If you are not staying overnight in the hospital, make sure you have someone who can drive you home after the test. 
After the test 
· If your doctor prescribed antibiotics, take them as directed. Do not stop taking them just because you feel better. You need to take the full course of antibiotics. · You may have some burning when you urinate for a day or two after the test. You may feel better if you drink more fluids. This may also help prevent an infection. · Your urine may have a pinkish color for a few days after the test. 
When should you call for help? Call your doctor now or seek immediate medical care if: 
· Your urine is still red or you see blood clots after you have urinated several times. · You cannot pass urine 8 hours after the test. 
· You get a fever or chills. · You have pain in your belly or your back just below your rib cage. This is also called flank pain. Watch closely for changes in your health, and be sure to contact your doctor if: 
· You have pain or burning when you urinate. A burning sensation is normal for a day or two after the test. But call if it does not get better. · You have a frequent urge to urinate but can pass only small amounts of urine. · Your urine is pink, red, or cloudy or smells bad. It is normal for the urine to have a pinkish color for a few days after the test. But call if it does not get better. · You do not get better as expected. Where can you learn more? Go to http://aram-klaus.info/. Enter H653 in the search box to learn more about \"Cystoscopy: Care Instructions. \" Current as of: August 12, 2016 Content Version: 11.2 © 8525-1711 [employer]. Care instructions adapted under license by [employer] (which disclaims liability or warranty for this information). If you have questions about a medical condition or this instruction, always ask your healthcare professional. [address] any warranty or liability for your use of this information. Introducing [facility]! [place] [employer]: 
Thank you for requesting a [employer] account. Our records indicate that you already have an active [employer] account. You can access your account anytime at [email or web address]. [facility]/[employer] Did you know that you can access your hospital and ER discharge instructions at any time in [employer]? You can also review all of your test results from your hospital stay or ER visit. Additional Information If you have questions, please visit the Frequently Asked Questions section of the [employer] website at [email or web address] Remember, [employer] is NOT to be used for urgent needs. For medical emergencies, dial 911. Now available from your iPhone and Android! Please provide this summary of care documentation to your next provider. Your primary care clinician is listed as [address]. If you have any questions after today's visit, please call [phone].

## 2017-06-07 NOTE — PROGRESS NOTES
Mart Pastures 27 y.o. female     Ms. 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 coli UTI     CT scan imaging of the abdomen and pelvis on 25 May 2017 shows normal kidneys,  ureters and bladder-images have been reviewed today on PACS with the patient     Review of Systems:   CNS: Right hemisphere CVA/left hemiparesis/frequent headaches  Respiratory: No cough wheezing or shortness of breath  Cardiovascular: Atrial fibrillation Xarelto anticoagulation/no chest pain   Intestinal: No dyspepsia diarrhea/+ constipation  Urinary: Urinary urgency and frequency  Skeletal: No bone or joint pain  Endocrine: Diabetes  Other:                                                                                       1 para 1   for fetal distress birth weight 5 lbs. 1 oz. Allergies:    Allergies   Allergen Reactions    Latex Hives    Contrast Agent [Iodine] Other (comments)     \"Burning with shooting pain\"    Macrobid [Nitrofurantoin Monohyd/M-Cryst] Diarrhea    Novolog Mix 70-30 [Insulin Asp Prt-Insulin Aspart] Nausea and Vomiting      Medications:    Current Outpatient Prescriptions   Medication Sig Dispense Refill    propranolol LA (INDERAL LA) 80 mg SR capsule Take 1 Cap by mouth two (2) times a day. 60 Cap 11    rivaroxaban (XARELTO) 15 mg tab tablet Take 1 Tab by mouth daily. 30 Tab 11    losartan (COZAAR) 100 mg tablet TAKE 1 TABLET BY MOUTH DAILY 90 Tab 0    simvastatin (ZOCOR) 40 mg tablet Take 1 Tab by mouth nightly. 30 Tab 3    glimepiride (AMARYL) 4 mg tablet Take  by mouth two (2) times a day.  LEVOTHYROXINE SODIUM (SYNTHROID PO) Take  by mouth.  insulin lispro (HUMALOG) 100 unit/mL injection by SubCUTAneous route. 20-40 units with meals      pregabalin (LYRICA) 75 mg capsule Take 1 Cap by mouth three (3) times daily. 90 Cap 3    Blood-Glucose Meter monitoring kit by Does Not Apply route two (2) times a day. 1 Kit 0    glucose blood VI test strips (ONE TOUCH ULTRA TEST) strip by Does Not Apply route. One touch ultra mini test strips 1 Package 11    fluticasone (FLONASE) 50 mcg/actuation nasal spray SHAKE LIQUID AND USE 2 SPRAYS IN EACH NOSTRIL DAILY AS NEEDED FOR RHINITIS OR ALLERGIES 1 Bottle 3    butalbital-acetaminophen-caffeine (FIORICET, ESGIC) -40 mg per tablet Take 1 Tab by mouth every six (6) hours as needed for Pain. Max Daily Amount: 4 Tabs. 30 Tab 0    furosemide (LASIX) 20 mg tablet Take as needed daily for leg swelling. 27 Tab 3       Past Medical History:   Diagnosis Date    Cerebral artery occlusion with cerebral infarction ([facility])     Diabetes ([facility])     Diabetic neuropathy ([facility])     Diabetic retinopathy     Hypercholesterolemia     Hypertension       Past Surgical History:   Procedure Laterality Date    HX ORTHOPAEDIC  January 2013    right toe nail surgery Dr. Marguerite Whitmore     Family History   Problem Relation Age of Onset    Diabetes Mother     Cancer Paternal Grandmother         Physical Examination: Well-nourished mature female in no apparent distress    Urinalysis: ++heme/negative nitrite/+++pro    PVR today 1 61 cc    Cystoscopy Report:     After prepping the introitus with Betadine solution and instilling 2% lidocaine jelly intraurethrally a flexible cystoscope was passed through the urethra into the bladder revealing normal urothelium of the bladder and urethra. There are no strictures stones tumors or diverticuli or trabeculations-ureteral orifices are both slitlike in appearance with clear urine jets observed from both sides. There are no abnormal blood vessels-no glomerulations hemorrhages or blood vessel injection or friability-  Procedure was uncomplicated well-tolerated by the patient    Gross CMG: Bladder capacity 300 cc/no detrusor irritability/ cc    Jorge L Bail test negative for stress urinary incontinence  Speculum exam-no anterior vaginal wall hypermobility  Lower extremity motor and sensory function are normal    Impression: Flaccid diabetic bladder dysfunction                        -Benign Essential Hematuria    Plan: Clean intermittent self-catheterization twice daily     Patient was instructed today in the technique of self catheterization  with16 Slovenian-soft red rubber catheter    rtc 3 mo-PVR      More than 1/2 of this 25 minute visit was spent in counselling and coordination of care, as described above. Eddie Licea MD  -electronically signed-    PLEASE NOTE:  This document has been produced using voice recognition software. Unrecognized errors in transcription may be present.

## 2017-07-06 ENCOUNTER — OFFICE VISIT (OUTPATIENT)
Dept: CARDIOLOGY CLINIC | Age: 32
End: 2017-07-06

## 2017-07-06 VITALS
HEART RATE: 84 BPM | BODY MASS INDEX: 30.55 KG/M2 | HEIGHT: 62 IN | SYSTOLIC BLOOD PRESSURE: 144 MMHG | WEIGHT: 166 LBS | OXYGEN SATURATION: 99 % | DIASTOLIC BLOOD PRESSURE: 90 MMHG

## 2017-07-06 DIAGNOSIS — I48.0 PAROXYSMAL ATRIAL FIBRILLATION (HCC): Primary | ICD-10-CM

## 2017-07-06 DIAGNOSIS — E78.00 PURE HYPERCHOLESTEROLEMIA: ICD-10-CM

## 2017-07-06 DIAGNOSIS — I10 ESSENTIAL HYPERTENSION: ICD-10-CM

## 2017-07-06 DIAGNOSIS — G45.8 OTHER SPECIFIED TRANSIENT CEREBRAL ISCHEMIAS: ICD-10-CM

## 2017-07-06 RX ORDER — PROPRANOLOL HYDROCHLORIDE 80 MG/1
80 CAPSULE, EXTENDED RELEASE ORAL DAILY
Qty: 90 CAP | Refills: 3 | Status: SHIPPED | OUTPATIENT
Start: 2017-07-06 | End: 2018-09-20 | Stop reason: DRUGHIGH

## 2017-07-06 RX ORDER — PROPRANOLOL HYDROCHLORIDE 120 MG/1
120 CAPSULE, EXTENDED RELEASE ORAL DAILY
Qty: 90 CAP | Refills: 3 | Status: SHIPPED | OUTPATIENT
Start: 2017-07-06 | End: 2018-09-11 | Stop reason: SDUPTHER

## 2017-07-06 NOTE — PROGRESS NOTES
1. Have you been to the ER, urgent care clinic since your last visit? Hospitalized since your last visit? No     2. Have you seen or consulted any other health care providers outside of the [address] [place] [address] since your last visit? Include any pap smears or colon screening.  No

## 2017-07-06 NOTE — MR AVS SNAPSHOT
Visit Information Date & Time Provider Department Dept. Phone Encounter #  
 7/6/2017 10:00 AM Rakesh Contreras MD [facility] [address] [id number] Your Appointments 7/13/2017  9:20 AM  
MEDICARE EXT with Crystal Haney MD  
975 [facility] ([address]) Appt Note: [id number]  
 [address] [address] [address] [place] [address] [address]  
  
    
 9/6/2017 10:00 AM  
Office Visit with Willy Fernandez MD  
[facility] [address]) Appt Note: check up [address] [place] [address] [address]  
[phone] [address] [facility] [address] Upcoming Health Maintenance Date Due  
 FOOT EXAM Q1 6/19/2017 PAP AKA CERVICAL CYTOLOGY 10/7/2017 Pneumococcal 19-64 Medium Risk (1 of 1 - PPSV23) 9/21/2017* INFLUENZA AGE 9 TO ADULT 8/1/2017 HEMOGLOBIN A1C Q6M 10/20/2017 EYE EXAM RETINAL OR DILATED Q1 11/4/2017 MICROALBUMIN Q1 1/12/2018 LIPID PANEL Q1 4/20/2018 DTaP/Tdap/Td series (2 - Td) 10/15/2025 *Topic was postponed. The date shown is not the original due date. Allergies as of 7/6/2017  Review Complete On: 6/6/2017 By: Willy Fernandez MD  
  
 Severity Noted Reaction Type Reactions Latex  05/20/2010    Hives Contrast Agent [Iodine]  05/17/2017    Other (comments) \"Burning with shooting pain\" Macrobid [Nitrofurantoin Monohyd/m-cryst]  07/21/2011   Intolerance Diarrhea Novolog Mix 70-30 [Insulin Asp Prt-insulin Aspart]  01/31/2013   Intolerance Nausea and Vomiting Current Immunizations  Reviewed on 10/15/2015 Name Date Influenza Vaccine (Quad) PF 10/3/2016, 10/15/2015, 9/11/2014 Influenza Vaccine PF 12/11/2012  1:10 PM  
 Tdap 10/15/2015 Not reviewed this visit You Were Diagnosed With   
  
 Codes Comments Paroxysmal atrial fibrillation (HCC)    -  Primary ICD-10-CM: I48.0 ICD-9-CM: 427.31 Pure hypercholesterolemia     ICD-10-CM: E78.00 ICD-9-CM: 272.0 Other specified transient cerebral ischemias     ICD-10-CM: G45.8 ICD-9-CM: 435.8 Vitals BP Pulse Height(growth percentile) Weight(growth percentile) SpO2 BMI  
 144/90 84 5' 2\" (1.575 m) 166 lb (75.3 kg) 99% 30.36 kg/m2 OB Status Smoking Status Having regular periods Never Smoker Vitals History BMI and BSA Data Body Mass Index Body Surface Area  
 30.36 kg/m 2 1.81 m 2 Preferred Pharmacy Pharmacy Name Phone [facility] [address] 40 [facility] 18 [phone] Your Updated Medication List  
  
   
This list is accurate as of: 7/6/17 11:13 AM.  Always use your most recent med list.  
  
  
  
  
 AMARYL 4 mg tablet Generic drug:  glimepiride Take  by mouth two (2) times a day. Blood-Glucose Meter monitoring kit  
by Does Not Apply route two (2) times a day. butalbital-acetaminophen-caffeine -40 mg per tablet Commonly known as:  Gerson Golden Take 1 Tab by mouth every six (6) hours as needed for Pain. Max Daily Amount: 4 Tabs. fluticasone 50 mcg/actuation nasal spray Commonly known as:  Bertha Clark SHAKE LIQUID AND USE 2 SPRAYS IN EACH NOSTRIL DAILY AS NEEDED FOR RHINITIS OR ALLERGIES  
  
 furosemide 20 mg tablet Commonly known as:  LASIX Take as needed daily for leg swelling. glucose blood VI test strips strip Commonly known as:  ONETOUCH ULTRA TEST  
by Does Not Apply route. One touch ultra mini test strips HumaLOG 100 unit/mL injection Generic drug:  insulin lispro  
by SubCUTAneous route. 20-40 units with meals  
  
 losartan 100 mg tablet Commonly known as:  COZAAR  
TAKE 1 TABLET BY MOUTH DAILY pregabalin 75 mg capsule Commonly known as:  Tammie Lan Take 1 Cap by mouth three (3) times daily. * propranolol LA 80 mg SR capsule Commonly known as:  INDERAL LA Take 1 Cap by mouth daily. Each evening * propranolol  mg SR capsule Commonly known as:  INDERAL LA Take 1 Cap by mouth daily. Every morning  
  
 rivaroxaban 15 mg Tab tablet Commonly known as:  Rip Hollow Take 1 Tab by mouth daily. simvastatin 40 mg tablet Commonly known as:  ZOCOR Take 1 Tab by mouth nightly. SYNTHROID PO Take  by mouth. * Notice: This list has 2 medication(s) that are the same as other medications prescribed for you. Read the directions carefully, and ask your doctor or other care provider to review them with you. Prescriptions Printed Refills  
 propranolol LA (INDERAL LA) 80 mg SR capsule 3 Sig: Take 1 Cap by mouth daily. Each evening Class: Print Route: Oral  
 propranolol LA (INDERAL LA) 120 mg SR capsule 3 Sig: Take 1 Cap by mouth daily. Every morning Class: Print Route: Oral  
  
We Performed the Following AMB POC EKG ROUTINE W/ 12 LEADS, INTER & REP [22884 CPT(R)] To-Do List   
 07/06/2017 Lab:  METABOLIC PANEL, COMPREHENSIVE Patient Instructions Continue current medications. No changes. Introducing [facility]! Dear Marci Rendon: 
Thank you for requesting a [employer] account. Our records indicate that you already have an active [employer] account. You can access your account anytime at [email or web address] Did you know that you can access your hospital and ER discharge instructions at any time in [employer]? You can also review all of your test results from your hospital stay or ER visit. Additional Information If you have questions, please visit the Frequently Asked Questions section of the [employer] website at [email or web address] Remember, [employer] is NOT to be used for urgent needs. For medical emergencies, dial 911. Now available from your iPhone and Android! Please provide this summary of care documentation to your next provider. Your primary care clinician is listed as [address]. If you have any questions after today's visit, please call [phone].

## 2017-07-10 NOTE — PROGRESS NOTES
PATIENT NAME: Asif Damico         27 y.o.      1985              DATE:7/6/2017    REASON FOR VISIT: Paroxysmal atrial fibrillation    HISTORY OF PRESENT ILLNESS: Rare palpitation brief in duration. The patient states that she had an \"TIA\" last weekend. These events are characterized by pain in the left arm and some transient weakness in the left arm. Occasionally associated with a headache. She is anticoagulated. Denies signs of bleeding. She is hypertensive. Blood pressures have been marginal.    She has a history of hyperlipidemia. A lipid profile done in April showed a total cholesterol of 247, triglycerides 169, HDL 48, . PAST MEDICAL HISTORY:   Past Medical History:  09/19/2016: Cardiac echocardiogram      Comment: [facility]:  Tech difficult. Pt refused 2nd attempt               at bubble study. EF 60%. No WMA. Mild conc                LVH.  RVSP 20 mmHg. No atrial shunting by                Doppler. 06/20/2016: Cardiovascular lower extremity venous duplex      Comment: No DVT bilaterally. 09/19/2016: Carotid duplex      Comment: Mild < 50% bilateral ICA stenosis. No date: Cerebral artery occlusion with cerebral infarc*  No date: Diabetes ([facility] [address]  No date: Diabetic neuropathy ([facility] [address]  No date: Diabetic retinopathy  No date: Hypercholesterolemia  No date: Hypertension    PAST SURGICAL HISTORY:   Past Surgical History:  January 2013: HX ORTHOPAEDIC      Comment: right toe nail surgery Dr. Ben Muniz:  Social History    Marital status: SINGLE              Spouse name:                       Years of education:                 Number of children:               Social History Main Topics    Smoking status: Never Smoker                                                                Smokeless status: Never Used                        Alcohol use: No              Drug use:  No              Sexual activity: No                       ALLERGIES:    -- Latex -- Hives   -- Contrast Agent [Iodine] -- Other (comments)    --  \"Burning with shooting pain\"   -- Macrobid [Nitrofurantoin Monohyd/M-Cryst] -- Diarrhea   -- Novolog Mix 70-30 [Insulin Asp Prt-Insulin Aspart] -- Nausea and Vomiting     CURRENT MEDICATIONS:   Current Outpatient Prescriptions:  propranolol LA (INDERAL LA) 80 mg SR capsule, Take 1 Cap by mouth daily. Each evening  propranolol LA (INDERAL LA) 120 mg SR capsule, Take 1 Cap by mouth daily. Every morning  fluticasone (FLONASE) 50 mcg/actuation nasal spray, SHAKE LIQUID AND USE 2 SPRAYS IN EACH NOSTRIL DAILY AS NEEDED FOR RHINITIS OR ALLERGIES  rivaroxaban (XARELTO) 15 mg tab tablet, Take 1 Tab by mouth daily. butalbital-acetaminophen-caffeine (FIORICET, ESGIC) -40 mg per tablet, Take 1 Tab by mouth every six (6) hours as needed for Pain. Max Daily Amount: 4 Tabs. losartan (COZAAR) 100 mg tablet, TAKE 1 TABLET BY MOUTH DAILY  simvastatin (ZOCOR) 40 mg tablet, Take 1 Tab by mouth nightly. furosemide (LASIX) 20 mg tablet, Take as needed daily for leg swelling. glimepiride (AMARYL) 4 mg tablet, Take  by mouth two (2) times a day. LEVOTHYROXINE SODIUM (SYNTHROID PO), Take  by mouth. insulin lispro (HUMALOG) 100 unit/mL injection, by SubCUTAneous route. 20-40 units with meals  pregabalin (LYRICA) 75 mg capsule, Take 1 Cap by mouth three (3) times daily. Blood-Glucose Meter monitoring kit, by Does Not Apply route two (2) times a day. glucose blood VI test strips (ONE TOUCH ULTRA TEST) strip, by Does Not Apply route. One touch ultra mini test strips    No current facility-administered medications for this visit.        REVIEW of SYSTEMS:History obtained from chart review and the patient  General ROS: negative for - weight gain or weight loss  Hematological and Lymphatic ROS: negative for - bleeding problems  Respiratory ROS: no cough, shortness of breath, or wheezing  Cardiovascular ROS: Please see history of present illness  Neurological ROS: See history of present illness     PHYSICAL EXAMINATION:   /90  Pulse 84  Ht 5' 2\" (1.575 m)  Wt 75.3 kg (166 lb)  SpO2 99%  BMI 30.36 kg/m2  BP Readings from Last 3 Encounters:  07/06/17 : 144/90  06/06/17 : (!) 155/103  05/09/17 : 130/82    Pulse Readings from Last 3 Encounters:  07/06/17 : 84  06/06/17 : (!) 104  05/09/17 : 92    Wt Readings from Last 3 Encounters:  07/06/17 : 75.3 kg (166 lb)  06/06/17 : 75.3 kg (166 lb)  05/09/17 : 75.8 kg (167 lb)    General: Moderately obese -American female in no apparent distress. HEENT: Sclera clear. Mucous membranes pink and moist.  Neck: No jugular venous distention. Carotid upstrokes 2+ without bruits. Chest: Clear to  auscultation. Heart: PMI not palpable. Regular rhythm. No murmur or gallop. Abdomen: Nontender without masses or organomegaly. Extremities: No edema. Skin: Warm and dry. No stasis changes. Neuro: Alert, oriented, speech WNL, no facial asymmetry. Gait WNL. EKG: Within normal limits    IMPRESSION:   Paroxysmal atrial fibrillation. Essentially asymptomatic  Anticoagulation uncomplicated  Hypertension, marginally controlled  I believe that these \"TIAs\" might actually represent migraine equivalents  Hyperlipidemia poorly controlled  History of renal insufficiency    Plan:  Comprehensive metabolic panel  Increase Inderal LA to 120 mg every morning and 80 mg q. p.m. Increase Zocor to 80 mg nightly  Return in 1-2 months    The diagnoses and plan were discussed with patient. All questions answered. Plan of care agreed to by all concerned. Johan Pereira.  MD [place]       ,

## 2017-07-12 ENCOUNTER — TELEPHONE (OUTPATIENT)
Dept: CARDIOLOGY CLINIC | Age: 32
End: 2017-07-12

## 2017-07-12 NOTE — TELEPHONE ENCOUNTER
----- Message from Huseyin Vu MD sent at 7/10/2017  3:41 PM EDT -----  I believe this patient should be increasing her Zocor to 80 mg nightly I think she is presently taking 40 mg

## 2017-07-13 NOTE — TELEPHONE ENCOUNTER
Unable to reach patient by telephone, multiple attempts made, no answering machine available, will mail letter requesting phone call.

## 2017-07-13 NOTE — TELEPHONE ENCOUNTER
----- Message from Jake Steinberg MD sent at 7/10/2017  3:41 PM EDT -----  I believe this patient should be increasing her Zocor to 80 mg nightly I think she is presently taking 40 mg

## 2017-07-27 ENCOUNTER — OFFICE VISIT (OUTPATIENT)
Dept: UROLOGY | Age: 32
End: 2017-07-27

## 2017-07-27 ENCOUNTER — TELEPHONE (OUTPATIENT)
Dept: CARDIOLOGY CLINIC | Age: 32
End: 2017-07-27

## 2017-07-27 VITALS
OXYGEN SATURATION: 99 % | HEART RATE: 83 BPM | TEMPERATURE: 98.6 F | WEIGHT: 166 LBS | BODY MASS INDEX: 30.55 KG/M2 | SYSTOLIC BLOOD PRESSURE: 140 MMHG | DIASTOLIC BLOOD PRESSURE: 80 MMHG | HEIGHT: 62 IN

## 2017-07-27 DIAGNOSIS — N31.2 FLACCID BLADDER: Primary | ICD-10-CM

## 2017-07-27 NOTE — MR AVS SNAPSHOT
Visit Information Date & Time Provider Department Dept. Phone Encounter #  
 7/27/2017 11:15 AM Farheen Jeronimo, [address] [facility] [phone] [id number] Your Appointments 9/6/2017 10:00 AM  
Office Visit with Farheen Jeronimo MD  
[facility] [address]) Appt Note: check up [address] [place] [address] [address] [phone] [address] [facility] [address] Upcoming Health Maintenance Date Due  
 FOOT EXAM Q1 6/19/2017 PAP AKA CERVICAL CYTOLOGY 10/7/2017 Pneumococcal 19-64 Medium Risk (1 of 1 - PPSV23) 9/21/2017* INFLUENZA AGE 9 TO ADULT 8/1/2017 HEMOGLOBIN A1C Q6M 10/20/2017 EYE EXAM RETINAL OR DILATED Q1 11/4/2017 MICROALBUMIN Q1 1/12/2018 LIPID PANEL Q1 4/20/2018 DTaP/Tdap/Td series (2 - Td) 10/15/2025 *Topic was postponed. The date shown is not the original due date. Allergies as of 7/27/2017  Review Complete On: 7/27/2017 By: Mikie Daigle Severity Noted Reaction Type Reactions Latex  05/20/2010    Hives Contrast Agent [Iodine]  05/17/2017    Other (comments) \"Burning with shooting pain\" Macrobid [Nitrofurantoin Monohyd/m-cryst]  07/21/2011   Intolerance Diarrhea Novolog Mix 70-30 [Insulin Asp Prt-insulin Aspart]  01/31/2013   Intolerance Nausea and Vomiting Current Immunizations  Reviewed on 10/15/2015 Name Date Influenza Vaccine (Quad) PF 10/3/2016, 10/15/2015, 9/11/2014 Influenza Vaccine PF 12/11/2012  1:10 PM  
 Tdap 10/15/2015 Not reviewed this visit Vitals BP Pulse Temp Height(growth percentile) Weight(growth percentile) SpO2  
 140/80 (BP 1 Location: Left arm, BP Patient Position: Sitting) 83 98.6 °F (37 °C) (Oral) 5' 2\" (1.575 m) 166 lb (75.3 kg) 99% BMI OB Status Smoking Status 30.36 kg/m2 Having regular periods Never Smoker BMI and BSA Data Body Mass Index Body Surface Area  
 30.36 kg/m 2 1.81 m 2 Preferred Pharmacy Pharmacy Name Phone [phone] [facility]  [phone] Your Updated Medication List  
  
   
This list is accurate as of: 7/27/17 12:54 PM.  Always use your most recent med list.  
  
  
  
  
 AMARYL 4 mg tablet Generic drug:  glimepiride Take  by mouth two (2) times a day. Blood-Glucose Meter monitoring kit  
by Does Not Apply route two (2) times a day. butalbital-acetaminophen-caffeine -40 mg per tablet Commonly known as:  Donnalee Sizer Take 1 Tab by mouth every six (6) hours as needed for Pain. Max Daily Amount: 4 Tabs. fluticasone 50 mcg/actuation nasal spray Commonly known as:  Star Serve SHAKE LIQUID AND USE 2 SPRAYS IN EACH NOSTRIL DAILY AS NEEDED FOR RHINITIS OR ALLERGIES  
  
 furosemide 20 mg tablet Commonly known as:  LASIX Take as needed daily for leg swelling. glucose blood VI test strips strip Commonly known as:  ONETOUCH ULTRA TEST  
by Does Not Apply route. One touch ultra mini test strips HumaLOG 100 unit/mL injection Generic drug:  insulin lispro  
by SubCUTAneous route. 20-40 units with meals  
  
 losartan 100 mg tablet Commonly known as:  COZAAR  
TAKE 1 TABLET BY MOUTH DAILY pregabalin 75 mg capsule Commonly known as:  Narinder Lefort Take 1 Cap by mouth three (3) times daily. * propranolol LA 80 mg SR capsule Commonly known as:  INDERAL LA Take 1 Cap by mouth daily. Each evening * propranolol  mg SR capsule Commonly known as:  INDERAL LA Take 1 Cap by mouth daily. Every morning  
  
 rivaroxaban 15 mg Tab tablet Commonly known as:  Bertell Lanius Take 1 Tab by mouth daily. simvastatin 40 mg tablet Commonly known as:  ZOCOR Take 1 Tab by mouth nightly. SYNTHROID PO Take  by mouth. * Notice: This list has 2 medication(s) that are the same as other medications prescribed for you. Read the directions carefully, and ask your doctor or other care provider to review them with you. Introducing [facility]! Dear Prakash Brantley: 
Thank you for requesting a [facility] account. Our records indicate that you already have an active [facility] account. You can access your account anytime at [email or web address]. [email or web address] Did you know that you can access your hospital and ER discharge instructions at any time in [facility]? You can also review all of your test results from your hospital stay or ER visit. Additional Information If you have questions, please visit the Frequently Asked Questions section of the [facility] website at [email or web address] Remember, [facility] is NOT to be used for urgent needs. For medical emergencies, dial 911. Now available from your iPhone and Android! Please provide this summary of care documentation to your next provider. Your primary care clinician is listed as [address]. If you have any questions after today's visit, please call [phone].

## 2017-07-27 NOTE — TELEPHONE ENCOUNTER
----- Message from Jarvis Lantigua MD sent at 7/10/2017  3:41 PM EDT -----  I believe this patient should be increasing her Zocor to 80 mg nightly I think she is presently taking 40 mg

## 2017-07-27 NOTE — PROGRESS NOTES
Juliana Odom 27 y.o. female     Ms. Amish Alberts seen today for follow-up flaccid diabetic bladder-CIC initiated 6 weeks ago    Patient reports moderate success in performing self-catheterization but he is experiencing frequent episodes of urine gushing around the catheter when performing CIC-also, at times, obtain support drainage when catheter placement is felt to be adequate-initially presenting with complaint of  urine having a foul odor during the past few months associated with urgency frequency or dysuria  No flank pain or gross hematuria  Patient has no history of urinary tract disease, trauma, or surgery  Previous evaluation by urogynecology-Dr. Arabella Sdihu bladder dysfunction secondary to diabetes-bladder pacemaker implantation considered but declined by patient  She has history of atrial fibrillation with  right hemisphere CVA leaving residual left hemiparesis-currently anticoagulated with Xarelto     Creatinine 1.45 in February 2017  Renal ultrasound imaging in October 2015 negative for urinary tract obstruction     E. coli UTI 2014     Review of Systems:   CNS: Right hemisphere CVA/left hemiparesis/frequent headaches  Respiratory: No cough wheezing or shortness of breath  Cardiovascular: Atrial fibrillation Xarelto anticoagulation/no chest pain   Intestinal: No dyspepsia diarrhea/+ constipation  Urinary: Urinary urgency and frequency  Skeletal: No bone or joint pain  Endocrine: Diabetes  Other:    Allergies: Allergies   Allergen Reactions    Latex Hives    Contrast Agent [Iodine] Other (comments)     \"Burning with shooting pain\"    Macrobid [Nitrofurantoin Monohyd/M-Cryst] Diarrhea    Novolog Mix 70-30 [Insulin Asp Prt-Insulin Aspart] Nausea and Vomiting      Medications:    Current Outpatient Prescriptions   Medication Sig Dispense Refill    propranolol LA (INDERAL LA) 80 mg SR capsule Take 1 Cap by mouth daily.  Each evening 90 Cap 3    propranolol LA (INDERAL LA) 120 mg SR capsule Take 1 Cap by mouth daily. Every morning 90 Cap 3    fluticasone (FLONASE) 50 mcg/actuation nasal spray SHAKE LIQUID AND USE 2 SPRAYS IN EACH NOSTRIL DAILY AS NEEDED FOR RHINITIS OR ALLERGIES 1 Bottle 3    rivaroxaban (XARELTO) 15 mg tab tablet Take 1 Tab by mouth daily. 30 Tab 11    butalbital-acetaminophen-caffeine (FIORICET, ESGIC) -40 mg per tablet Take 1 Tab by mouth every six (6) hours as needed for Pain. Max Daily Amount: 4 Tabs. 30 Tab 0    losartan (COZAAR) 100 mg tablet TAKE 1 TABLET BY MOUTH DAILY 90 Tab 0    simvastatin (ZOCOR) 40 mg tablet Take 1 Tab by mouth nightly. 30 Tab 3    furosemide (LASIX) 20 mg tablet Take as needed daily for leg swelling. 30 Tab 3    glimepiride (AMARYL) 4 mg tablet Take  by mouth two (2) times a day.  LEVOTHYROXINE SODIUM (SYNTHROID PO) Take  by mouth.  insulin lispro (HUMALOG) 100 unit/mL injection by SubCUTAneous route. 20-40 units with meals      pregabalin (LYRICA) 75 mg capsule Take 1 Cap by mouth three (3) times daily. 90 Cap 3    Blood-Glucose Meter monitoring kit by Does Not Apply route two (2) times a day. 1 Kit 0    glucose blood VI test strips (ONE TOUCH ULTRA TEST) strip by Does Not Apply route. One touch ultra mini test strips 1 Package 11       Past Medical History:   Diagnosis Date    Cardiac echocardiogram 09/19/2016    [facility]:  Tech difficult. Pt refused 2nd attempt at bubble study. EF 60%. No WMA. Mild conc LVH. RVSP 20 mmHg. No atrial shunting by Doppler.  Cardiovascular lower extremity venous duplex 06/20/2016    No DVT bilaterally.  Carotid duplex 09/19/2016    Mild < 50% bilateral ICA stenosis.     Cerebral artery occlusion with cerebral infarction ([address]     Diabetes ([address]     Diabetic neuropathy ([address]     Diabetic retinopathy     Hypercholesterolemia     Hypertension       Past Surgical History:   Procedure Laterality Date    HX ORTHOPAEDIC  January 2013    right toe nail surgery Dr. Silvano Bailey     Family History   Problem Relation Age of Onset    Diabetes Mother     Cancer Paternal Grandmother         Physical Examination: Well-nourished mature female in no apparent distress      Impression: Flaccid bladder dysfunction secondary to diabetes    Plan: Technique for CIC repeated today with instructions to use a 25 Western Milly or 20 Western Milly red rubber cathetern modified to be 5-6 inches in length            -Explained the difference in bladder drainage when catheterization performed while supine versus sitting-CIC catheter may require minor manipulation in order to establish proper drainage    rtc 3 mo      More than 1/2 of this 15 minute visit was spent in counselling and coordination of care, as described above. Chato Torres MD  -electronically signed-    PLEASE NOTE:  This document has been produced using voice recognition software. Unrecognized errors in transcription may be present.

## 2017-07-28 NOTE — TELEPHONE ENCOUNTER
2nd attempt to contact patient, unable to leave message, no voicemail. Will mail letter out requesting return call.

## 2017-09-06 ENCOUNTER — OFFICE VISIT (OUTPATIENT)
Dept: UROLOGY | Age: 32
End: 2017-09-06

## 2017-09-06 VITALS
HEIGHT: 62 IN | DIASTOLIC BLOOD PRESSURE: 77 MMHG | BODY MASS INDEX: 31.28 KG/M2 | SYSTOLIC BLOOD PRESSURE: 147 MMHG | OXYGEN SATURATION: 98 % | WEIGHT: 170 LBS | TEMPERATURE: 97.7 F | HEART RATE: 89 BPM

## 2017-09-06 DIAGNOSIS — N31.2 FLACCID BLADDER: Primary | ICD-10-CM

## 2017-09-06 LAB
BILIRUB UR QL STRIP: NEGATIVE
GLUCOSE UR-MCNC: NORMAL MG/DL
KETONES P FAST UR STRIP-MCNC: NEGATIVE MG/DL
PH UR STRIP: 5.5 [PH] (ref 4.6–8)
PROT UR QL STRIP: NORMAL MG/DL
SP GR UR STRIP: 1.02 (ref 1–1.03)
UA UROBILINOGEN AMB POC: NORMAL (ref 0.2–1)
URINALYSIS CLARITY POC: NORMAL
URINALYSIS COLOR POC: YELLOW
URINE BLOOD POC: NORMAL
URINE LEUKOCYTES POC: NEGATIVE
URINE NITRITES POC: NEGATIVE

## 2017-09-06 NOTE — PATIENT INSTRUCTIONS
Frequent Urination: Care Instructions  Your Care Instructions  An urge to urinate frequently but usually passing only small amounts of urine is a common symptom of urinary problems, such as urinary tract infections. The bladder may become inflamed. This can cause the urge to urinate. You may try to urinate more often than usual to try to soothe that urge. Frequent urination also may be caused by sexually transmitted infections (STIs) or kidney stones. Or it may happen when something irritates the tube that carries urine from the bladder to the outside of the body (urethra). It may also be a sign of diabetes. The cause may be hard to find. You may need tests. Follow-up care is a key part of your treatment and safety. Be sure to make and go to all appointments, and call your doctor if you are having problems. It's also a good idea to know your test results and keep a list of the medicines you take. How can you care for yourself at home? · Drink extra water for the next day or two. This will help make the urine less concentrated. (If you have kidney, heart, or liver disease and have to limit fluids, talk with your doctor before you increase the amount of fluids you drink.)  · Avoid drinks that are carbonated or have caffeine. They can irritate the bladder. For women:  · Urinate right after you have sex. · After you go to the bathroom, wipe from front to back. · Avoid douches, bubble baths, and feminine hygiene sprays. And avoid other feminine hygiene products that have deodorants. When should you call for help? Call your doctor now or seek immediate medical care if:  · You have new symptoms, such as fever, nausea, or vomiting. · You have new or worse symptoms of a urinary problem. For example:  ¨ You have blood or pus in your urine. ¨ You have chills or body aches. ¨ It hurts to urinate. ¨ You have groin or belly pain. ¨ You have pain in your back just below your rib cage (the flank area).   Watch closely for changes in your health, and be sure to contact your doctor if you feel thirstier than usual.  Where can you learn more? Go to http://aram-[employer].info/. Enter 196 9921 in the search box to learn more about \"Frequent Urination: Care Instructions. \"  Current as of: May 5, 2017  Content Version: 11.3  © 8985-4036 [employer]. Care instructions adapted under license by [employer] (which disclaims liability or warranty for this information). If you have questions about a medical condition or this instruction, always ask your healthcare professional. [place] [address] any warranty or liability for your use of this information.

## 2017-09-06 NOTE — PROGRESS NOTES
Ms. Kenneth Singh has a reminder for a \"due or due soon\" health maintenance. I have asked that she contact her primary care provider for follow-up on this health maintenance.

## 2017-09-06 NOTE — PROGRESS NOTES
Betty Rivera 27 y.o. female     Ms. Jorge L Patiño seen today for follow-up diabetic flaccid bladder dysfunction-CIC initiated 3 months ago here today for follow-up of CIC  Patient reports initial difficulty performing CIC but is now able to drain bladder readily using a stiff disposable catheter-at times, experiences pain when passing catheter-this aspect of CIC inhibits patient's enthusiasm for doing CIC several times each day-now performing CIC every 3 or 4 days when bladder feels  unempty after voiding      Patient reports moderate success in performing self-catheterization but he is experiencing frequent episodes of urine gushing around the catheter when performing CIC-also, at times, obtain support drainage when catheter placement is felt to be adequate-initially presenting with complaint of  urine having a foul odor during the past few months associated with urgency frequency or dysuria  No flank pain or gross hematuria  Patient has no history of urinary tract disease, trauma, or surgery  Previous evaluation by urogynecology-Dr. 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 coli UTI 2014      Review of Systems:   CNS: Right hemisphere CVA/left hemiparesis/frequent headaches  Respiratory: No cough wheezing or shortness of breath  Cardiovascular: Atrial fibrillation Xarelto anticoagulation/no chest pain   Intestinal: No dyspepsia diarrhea/+ constipation  Urinary: Urinary urgency and frequency  Skeletal: No bone or joint pain  Endocrine: Diabetes  Other:    Allergies:    Allergies   Allergen Reactions    Latex Hives    Contrast Agent [Iodine] Other (comments)     \"Burning with shooting pain\"    Macrobid [Nitrofurantoin Monohyd/M-Cryst] Diarrhea    Novolog Mix 70-30 [Insulin Asp Prt-Insulin Aspart] Nausea and Vomiting      Medications:    Current Outpatient Prescriptions   Medication Sig Dispense Refill    propranolol LA (INDERAL LA) 80 mg SR capsule Take 1 Cap by mouth daily. Each evening 90 Cap 3    propranolol LA (INDERAL LA) 120 mg SR capsule Take 1 Cap by mouth daily. Every morning 90 Cap 3    fluticasone (FLONASE) 50 mcg/actuation nasal spray SHAKE LIQUID AND USE 2 SPRAYS IN EACH NOSTRIL DAILY AS NEEDED FOR RHINITIS OR ALLERGIES 1 Bottle 3    rivaroxaban (XARELTO) 15 mg tab tablet Take 1 Tab by mouth daily. 30 Tab 11    butalbital-acetaminophen-caffeine (FIORICET, ESGIC) -40 mg per tablet Take 1 Tab by mouth every six (6) hours as needed for Pain. Max Daily Amount: 4 Tabs. 30 Tab 0    losartan (COZAAR) 100 mg tablet TAKE 1 TABLET BY MOUTH DAILY 90 Tab 0    simvastatin (ZOCOR) 40 mg tablet Take 1 Tab by mouth nightly. 30 Tab 3    furosemide (LASIX) 20 mg tablet Take as needed daily for leg swelling. 30 Tab 3    glimepiride (AMARYL) 4 mg tablet Take  by mouth two (2) times a day.  LEVOTHYROXINE SODIUM (SYNTHROID PO) Take  by mouth.  insulin lispro (HUMALOG) 100 unit/mL injection by SubCUTAneous route. 20-40 units with meals      pregabalin (LYRICA) 75 mg capsule Take 1 Cap by mouth three (3) times daily. 90 Cap 3    Blood-Glucose Meter monitoring kit by Does Not Apply route two (2) times a day. 1 Kit 0    glucose blood VI test strips (ONE TOUCH ULTRA TEST) strip by Does Not Apply route. One touch ultra mini test strips 1 Package 11       Past Medical History:   Diagnosis Date    Cardiac echocardiogram 09/19/2016    [facility]:  Tech difficult. Pt refused 2nd attempt at bubble study. EF 60%. No WMA. Mild conc LVH. RVSP 20 mmHg. No atrial shunting by Doppler.  Cardiovascular lower extremity venous duplex 06/20/2016    No DVT bilaterally.     Carotid duplex 09/19/2016    Mild < 50% bilateral ICA stenosis.  Cerebral artery occlusion with cerebral infarction ([address]     Diabetes [facility]     Diabetic neuropathy [facility]     Diabetic retinopathy     Hypercholesterolemia     Hypertension       Past Surgical History:   Procedure Laterality Date    HX ORTHOPAEDIC  January 2013    right toe nail surgery Dr. Christine Harrison     Family History   Problem Relation Age of Onset    Diabetes Mother     Cancer Paternal Grandmother         Physical Examination: Well-nourished mature female in no apparent distress    Urinalysis: Negative nitrite/++heme/ ++pro    Impression: Flaccid neurogenic bladder doing well on CIC        Plan: Recommend soft red rubber 16 Arabic catheter for CIC in order to improve comfort on passage of catheter through the urethra    Recommend CIC at least once daily    rtc 6 mo    More than 1/2 of this 15 minute visit was spent in counselling and coordination of care, as described above. Autumn Alcala MD  -electronically signed-    PLEASE NOTE:  This document has been produced using voice recognition software. Unrecognized errors in transcription may be present.

## 2017-09-06 NOTE — MR AVS SNAPSHOT
Visit Information Date & Time Provider Department Dept. Phone Encounter #  
 9/6/2017 10:00 AM Meek Fregoso, [address] [id number] Your Appointments 3/7/2018 10:30 AM  
Office Visit with Meek Fregoso MD  
[facility] [address]) Appt Note: checkup [address] [facility] [address] [id number] [phone] [address] [place] [id number] Upcoming Health Maintenance Date Due  
 FOOT EXAM Q1 6/19/2017 INFLUENZA AGE 9 TO ADULT 8/1/2017 PAP AKA CERVICAL CYTOLOGY 10/7/2017 Pneumococcal 19-64 Medium Risk (1 of 1 - PPSV23) 9/21/2017* HEMOGLOBIN A1C Q6M 10/20/2017 EYE EXAM RETINAL OR DILATED Q1 11/4/2017 MICROALBUMIN Q1 1/12/2018 LIPID PANEL Q1 4/20/2018 DTaP/Tdap/Td series (2 - Td) 10/15/2025 *Topic was postponed. The date shown is not the original due date. Allergies as of 9/6/2017  Review Complete On: 9/6/2017 By: Charly Garza Severity Noted Reaction Type Reactions Latex  05/20/2010    Hives Contrast Agent [Iodine]  05/17/2017    Other (comments) \"Burning with shooting pain\" Macrobid [Nitrofurantoin Monohyd/m-cryst]  07/21/2011   Intolerance Diarrhea Novolog Mix 70-30 [Insulin Asp Prt-insulin Aspart]  01/31/2013   Intolerance Nausea and Vomiting Current Immunizations  Reviewed on 10/15/2015 Name Date Influenza Vaccine (Quad) PF 10/3/2016, 10/15/2015, 9/11/2014 Influenza Vaccine PF 12/11/2012  1:10 PM  
 Tdap 10/15/2015 Not reviewed this visit You Were Diagnosed With   
  
 Codes Comments Flaccid bladder    -  Primary ICD-10-CM: N31.2 ICD-9-CM: 596.54 Vitals BP Pulse Temp Height(growth percentile) Weight(growth percentile) SpO2  
 147/77 (BP 1 Location: Left arm, BP Patient Position: Sitting) 89 97.7 °F (36.5 °C) (Oral) 5' 2\" (1.575 m) 170 lb (77.1 kg) 98% BMI OB Status Smoking Status 31.09 kg/m2 Having regular periods Never Smoker Vitals History BMI and BSA Data Body Mass Index Body Surface Area 31.09 kg/m 2 1.84 m 2 Preferred Pharmacy Pharmacy Name Phone [address] [facility] [address] [phone] Your Updated Medication List  
  
   
This list is accurate as of: 9/6/17 11:06 AM.  Always use your most recent med list.  
  
  
  
  
 AMARYL 4 mg tablet Generic drug:  glimepiride Take  by mouth two (2) times a day. Blood-Glucose Meter monitoring kit  
by Does Not Apply route two (2) times a day. butalbital-acetaminophen-caffeine -40 mg per tablet Commonly known as:  Berton Cons Take 1 Tab by mouth every six (6) hours as needed for Pain. Max Daily Amount: 4 Tabs. fluticasone 50 mcg/actuation nasal spray Commonly known as:  See Bruno SHAKE LIQUID AND USE 2 SPRAYS IN EACH NOSTRIL DAILY AS NEEDED FOR RHINITIS OR ALLERGIES  
  
 furosemide 20 mg tablet Commonly known as:  LASIX Take as needed daily for leg swelling. glucose blood VI test strips strip Commonly known as:  ONETOUCH ULTRA TEST  
by Does Not Apply route. One touch ultra mini test strips HumaLOG 100 unit/mL injection Generic drug:  insulin lispro  
by SubCUTAneous route. 20-40 units with meals  
  
 losartan 100 mg tablet Commonly known as:  COZAAR  
TAKE 1 TABLET BY MOUTH DAILY pregabalin 75 mg capsule Commonly known as:  Radu Mola Take 1 Cap by mouth three (3) times daily. * propranolol LA 80 mg SR capsule Commonly known as:  INDERAL LA Take 1 Cap by mouth daily. Each evening * propranolol  mg SR capsule Commonly known as:  INDERAL LA Take 1 Cap by mouth daily. Every morning  
  
 rivaroxaban 15 mg Tab tablet Commonly known as:  Spenser Lemon Take 1 Tab by mouth daily. simvastatin 40 mg tablet Commonly known as:  ZOCOR Take 1 Tab by mouth nightly. SYNTHROID PO Take  by mouth. * Notice: This list has 2 medication(s) that are the same as other medications prescribed for you. Read the directions carefully, and ask your doctor or other care provider to review them with you. We Performed the Following AMB POC URINALYSIS DIP STICK AUTO W/O MICRO [62352 CPT(R)] Patient Instructions Frequent Urination: Care Instructions Your Care Instructions An urge to urinate frequently but usually passing only small amounts of urine is a common symptom of urinary problems, such as urinary tract infections. The bladder may become inflamed. This can cause the urge to urinate. You may try to urinate more often than usual to try to soothe that urge. Frequent urination also may be caused by sexually transmitted infections (STIs) or kidney stones. Or it may happen when something irritates the tube that carries urine from the bladder to the outside of the body (urethra). It may also be a sign of diabetes. The cause may be hard to find. You may need tests. Follow-up care is a key part of your treatment and safety. Be sure to make and go to all appointments, and call your doctor if you are having problems. It's also a good idea to know your test results and keep a list of the medicines you take. How can you care for yourself at home? · Drink extra water for the next day or two. This will help make the urine less concentrated. (If you have kidney, heart, or liver disease and have to limit fluids, talk with your doctor before you increase the amount of fluids you drink.) · Avoid drinks that are carbonated or have caffeine. They can irritate the bladder. For women: · Urinate right after you have sex. · After you go to the bathroom, wipe from front to back. · Avoid douches, bubble baths, and feminine hygiene sprays.  And avoid other feminine hygiene products that have deodorants. When should you call for help? Call your doctor now or seek immediate medical care if: 
· You have new symptoms, such as fever, nausea, or vomiting. · You have new or worse symptoms of a urinary problem. For example: ¨ You have blood or pus in your urine. ¨ You have chills or body aches. ¨ It hurts to urinate. ¨ You have groin or belly pain. ¨ You have pain in your back just below your rib cage (the flank area). Watch closely for changes in your health, and be sure to contact your doctor if you feel thirstier than usual. 
Where can you learn more? Go to http://aram-klaus.info/. Enter 510 [id number] in the search box to learn more about \"Frequent Urination: Care Instructions. \" Current as of: May 5, 2017 Content Version: 11.3 © 9128-6134 [employer]. Care instructions adapted under license by [employer] (which disclaims liability or warranty for this information). If you have questions about a medical condition or this instruction, always ask your healthcare professional. [place] [address] any warranty or liability for your use of this information. Introducing [facility]! Dear Johanny Grimm: 
Thank you for requesting a Agent Panda account. Our records indicate that you already have an active Agent Panda account. You can access your account anytime at [email or web address] Did you know that you can access your hospital and ER discharge instructions at any time in Agent Panda? You can also review all of your test results from your hospital stay or ER visit. Additional Information If you have questions, please visit the Frequently Asked Questions section of the Agent Panda website at [email or web address] Remember, Agent Panda is NOT to be used for urgent needs. For medical emergencies, dial 911. Now available from your iPhone and Android! Please provide this summary of care documentation to your next provider. Your primary care clinician is listed as [address]. If you have any questions after today's visit, please call [phone].

## 2017-10-20 ENCOUNTER — CLINICAL SUPPORT (OUTPATIENT)
Dept: FAMILY MEDICINE CLINIC | Age: 32
End: 2017-10-20

## 2017-10-20 DIAGNOSIS — Z23 ENCOUNTER FOR IMMUNIZATION: Primary | ICD-10-CM

## 2017-10-26 ENCOUNTER — OFFICE VISIT (OUTPATIENT)
Dept: FAMILY MEDICINE CLINIC | Age: 32
End: 2017-10-26

## 2017-10-26 VITALS
WEIGHT: 174.4 LBS | DIASTOLIC BLOOD PRESSURE: 103 MMHG | HEART RATE: 98 BPM | RESPIRATION RATE: 14 BRPM | BODY MASS INDEX: 32.09 KG/M2 | HEIGHT: 62 IN | OXYGEN SATURATION: 98 % | TEMPERATURE: 98.7 F | SYSTOLIC BLOOD PRESSURE: 170 MMHG

## 2017-10-26 DIAGNOSIS — R82.90 BAD ODOR OF URINE: Primary | ICD-10-CM

## 2017-10-26 DIAGNOSIS — N30.00 ACUTE CYSTITIS WITHOUT HEMATURIA: ICD-10-CM

## 2017-10-26 LAB
BILIRUB UR QL STRIP: NEGATIVE
GLUCOSE UR-MCNC: NEGATIVE MG/DL
KETONES P FAST UR STRIP-MCNC: NEGATIVE MG/DL
PH UR STRIP: 6 [PH] (ref 4.6–8)
PROT UR QL STRIP: NORMAL MG/DL
SP GR UR STRIP: 1.02 (ref 1–1.03)
UA UROBILINOGEN AMB POC: NORMAL (ref 0.2–1)
URINALYSIS CLARITY POC: NORMAL
URINALYSIS COLOR POC: NORMAL
URINE BLOOD POC: NORMAL
URINE LEUKOCYTES POC: NEGATIVE
URINE NITRITES POC: NEGATIVE

## 2017-10-26 RX ORDER — PHENAZOPYRIDINE HYDROCHLORIDE 100 MG/1
100 TABLET, FILM COATED ORAL
Qty: 9 TAB | Refills: 0 | Status: SHIPPED | OUTPATIENT
Start: 2017-10-26 | End: 2017-10-29

## 2017-10-26 NOTE — PATIENT INSTRUCTIONS

## 2017-10-26 NOTE — PROGRESS NOTES
Chief Complaint   Patient presents with    Urinary Frequency     Patient is here today for UTI x  2 days . Pt had a soda two days ago and start UTI and odor. Pt urologist has her self catheter x 2 mths. 1. Have you been to the ER, urgent care clinic since your last visit? Hospitalized since your last visit? No    2. Have you seen or consulted any other health care providers outside of the [address] [place] [address] since your last visit? Include any pap smears or colon screening.  No

## 2017-10-26 NOTE — PROGRESS NOTES
Chief Complaint   Patient presents with    Urinary Frequency       HPI:  Patient is a 27year old female presents today for an acute visit with strong urine odor. She just finished her period and started perceiving strong urine odor 2 days ago, though she denies dysuria, hematuria, urgency, frequency, flank pain, fever, or chills, and thus came in today for evaluation. Past Medical History:   Diagnosis Date    Cardiac echocardiogram 09/19/2016    [facility]:  Tech difficult. Pt refused 2nd attempt at bubble study. EF 60%. No WMA. Mild conc LVH. RVSP 20 mmHg. No atrial shunting by Doppler.  Cardiovascular lower extremity venous duplex 06/20/2016    No DVT bilaterally.  Carotid duplex 09/19/2016    Mild < 50% bilateral ICA stenosis.  Cerebral artery occlusion with cerebral infarction ([address]     Diabetes ([address]     Diabetic neuropathy ([address]     Diabetic retinopathy     Hypercholesterolemia     Hypertension      Allergies   Allergen Reactions    Latex Hives    Contrast Agent [Iodine] Other (comments)     \"Burning with shooting pain\"    Macrobid [Nitrofurantoin Monohyd/M-Cryst] Diarrhea    Novolog Mix 70-30 [Insulin Asp Prt-Insulin Aspart] Nausea and Vomiting     Current Outpatient Prescriptions   Medication Sig Dispense Refill    propranolol LA (INDERAL LA) 80 mg SR capsule Take 1 Cap by mouth daily. Each evening 90 Cap 3    propranolol LA (INDERAL LA) 120 mg SR capsule Take 1 Cap by mouth daily. Every morning 90 Cap 3    fluticasone (FLONASE) 50 mcg/actuation nasal spray SHAKE LIQUID AND USE 2 SPRAYS IN EACH NOSTRIL DAILY AS NEEDED FOR RHINITIS OR ALLERGIES 1 Bottle 3    rivaroxaban (XARELTO) 15 mg tab tablet Take 1 Tab by mouth daily. 30 Tab 11    butalbital-acetaminophen-caffeine (FIORICET, ESGIC) -40 mg per tablet Take 1 Tab by mouth every six (6) hours as needed for Pain. Max Daily Amount: 4 Tabs.  30 Tab 0    losartan (COZAAR) 100 mg tablet TAKE 1 TABLET BY MOUTH DAILY 90 Tab 0  simvastatin (ZOCOR) 40 mg tablet Take 1 Tab by mouth nightly. 30 Tab 3    furosemide (LASIX) 20 mg tablet Take as needed daily for leg swelling. 30 Tab 3    glimepiride (AMARYL) 4 mg tablet Take  by mouth two (2) times a day.  LEVOTHYROXINE SODIUM (SYNTHROID PO) Take  by mouth.  insulin lispro (HUMALOG) 100 unit/mL injection by SubCUTAneous route. 20-40 units with meals      pregabalin (LYRICA) 75 mg capsule Take 1 Cap by mouth three (3) times daily. 90 Cap 3    Blood-Glucose Meter monitoring kit by Does Not Apply route two (2) times a day. 1 Kit 0    glucose blood VI test strips (ONE TOUCH ULTRA TEST) strip by Does Not Apply route. One touch ultra mini test strips 1 Package 11          ROS:  Pertinent as in HPI      Physical Exam:  Visit Vitals    BP (!) 170/103 (BP 1 Location: Left arm, BP Patient Position: Sitting)    Pulse 98    Temp 98.7 °F (37.1 °C) (Oral)    Resp 14    Ht 5' 2\" (1.575 m)    Wt 174 lb 6.4 oz (79.1 kg)    SpO2 98%    BMI 31.9 kg/m2     General: a & o x 3, afebrile, well-nourished, interacting appropriately, in no acute distress  Respiratory: symmetrical chest expansion, lung sounds clear bilaterally  Cardiovascular: normal S1S2, regular rate and rhythm  Abdomen: non-distended, normoactive bowel sounds x 4 quadrants, soft, non-tender to palpation, and no CVA tenderness noted        Assessment/Plan:    ICD-10-CM ICD-9-CM    1. Bad odor of urine R82.90 791.9 UA done today was negative and Pyridium given. phenazopyridine (PYRIDIUM) 100 mg tablet           Orders Placed This Encounter    AMB POC URINALYSIS DIP STICK AUTO W/O MICRO    phenazopyridine (PYRIDIUM) 100 mg tablet     Sig: Take 1 Tab by mouth three (3) times daily (after meals) for 3 days. Dispense:  9 Tab     Refill:  0         Additional Notes: Discussed today's diagnosis, treatment plans. Discussed medication indications and side effects.     After Visit Summary: Discussed provided printed patient instructions. Answered questions accordingly.   Follow-up Disposition: As previously scheduled with PCP        Amada Mckenna DO, MPH  Internal Medicine

## 2017-10-26 NOTE — MR AVS SNAPSHOT
Visit Information Date & Time Provider Department Dept. Phone Encounter #  
 10/26/2017  4:15  [address] [facility] [phone] [id number] Your Appointments 3/7/2018 10:30 AM  
Office Visit with Karine Mckeon MD  
[facility]) Appt Note: checkup [address] [place] [address] [id number] [phone] [address] 600 [facility] [address] Upcoming Health Maintenance Date Due Pneumococcal 19-64 Medium Risk (1 of 1 - PPSV23) 4/8/2002 FOOT EXAM Q1 6/19/2017 PAP AKA CERVICAL CYTOLOGY 10/7/2017 HEMOGLOBIN A1C Q6M 10/20/2017 EYE EXAM RETINAL OR DILATED Q1 11/4/2017 MICROALBUMIN Q1 1/12/2018 LIPID PANEL Q1 4/20/2018 DTaP/Tdap/Td series (2 - Td) 10/15/2025 Allergies as of 10/26/2017  Review Complete On: 10/26/2017 By: Alban Bradford LPN Severity Noted Reaction Type Reactions Latex  05/20/2010    Hives Contrast Agent [Iodine]  05/17/2017    Other (comments) \"Burning with shooting pain\" Macrobid [Nitrofurantoin Monohyd/m-cryst]  07/21/2011   Intolerance Diarrhea Novolog Mix 70-30 [Insulin Asp Prt-insulin Aspart]  01/31/2013   Intolerance Nausea and Vomiting Current Immunizations  Reviewed on 10/15/2015 Name Date Influenza Vaccine (Quad) PF 10/20/2017, 10/3/2016, 10/15/2015, 9/11/2014 Influenza Vaccine PF 12/11/2012  1:10 PM  
 Tdap 10/15/2015 Not reviewed this visit You Were Diagnosed With   
  
 Codes Comments Bad odor of urine    -  Primary ICD-10-CM: R82.90 ICD-9-CM: 791.9 Acute cystitis without hematuria     ICD-10-CM: N30.00 ICD-9-CM: 595.0 Vitals BP Pulse Temp Resp Height(growth percentile) Weight(growth percentile) (!) 170/103 (BP 1 Location: Left arm, BP Patient Position: Sitting) 98 98.7 °F (37.1 °C) (Oral) 14 5' 2\" (1.575 m) 174 lb 6.4 oz (79.1 kg) SpO2 BMI OB Status Smoking Status 98% 31.9 kg/m2 Having regular periods Never Smoker Vitals History BMI and BSA Data Body Mass Index Body Surface Area 31.9 kg/m 2 1.86 m 2 Preferred Pharmacy Pharmacy Name Phone [facility] [address] 27 [facility] [place] 18 [phone] Your Updated Medication List  
  
   
This list is accurate as of: 10/26/17  4:55 PM.  Always use your most recent med list.  
  
  
  
  
 AMARYL 4 mg tablet Generic drug:  glimepiride Take  by mouth two (2) times a day. Blood-Glucose Meter monitoring kit  
by Does Not Apply route two (2) times a day. butalbital-acetaminophen-caffeine -40 mg per tablet Commonly known as:  Meredith Reasoner Take 1 Tab by mouth every six (6) hours as needed for Pain. Max Daily Amount: 4 Tabs. fluticasone 50 mcg/actuation nasal spray Commonly known as:  Domnick Means SHAKE LIQUID AND USE 2 SPRAYS IN EACH NOSTRIL DAILY AS NEEDED FOR RHINITIS OR ALLERGIES  
  
 furosemide 20 mg tablet Commonly known as:  LASIX Take as needed daily for leg swelling. glucose blood VI test strips strip Commonly known as:  ONETOUCH ULTRA TEST  
by Does Not Apply route. One touch ultra mini test strips HumaLOG 100 unit/mL injection Generic drug:  insulin lispro  
by SubCUTAneous route. 20-40 units with meals  
  
 losartan 100 mg tablet Commonly known as:  COZAAR  
TAKE 1 TABLET BY MOUTH DAILY pregabalin 75 mg capsule Commonly known as:  Sandra Spain Take 1 Cap by mouth three (3) times daily. * propranolol LA 80 mg SR capsule Commonly known as:  INDERAL LA Take 1 Cap by mouth daily. Each evening * propranolol  mg SR capsule Commonly known as:  INDERAL LA Take 1 Cap by mouth daily. Every morning  
  
 rivaroxaban 15 mg Tab tablet Commonly known as:  Merly Pro Take 1 Tab by mouth daily. simvastatin 40 mg tablet Commonly known as:  ZOCOR Take 1 Tab by mouth nightly. SYNTHROID PO Take  by mouth. * Notice: This list has 2 medication(s) that are the same as other medications prescribed for you. Read the directions carefully, and ask your doctor or other care provider to review them with you. We Performed the Following AMB POC URINALYSIS DIP STICK AUTO W/O MICRO [56276 CPT(R)] Patient Instructions Urinary Tract Infection in Women: Care Instructions Your Care Instructions A urinary tract infection, or UTI, is a general term for an infection anywhere between the kidneys and the urethra (where urine comes out). Most UTIs are bladder infections. They often cause pain or burning when you urinate. UTIs are caused by bacteria and can be cured with antibiotics. Be sure to complete your treatment so that the infection goes away. Follow-up care is a key part of your treatment and safety. Be sure to make and go to all appointments, and call your doctor if you are having problems. It's also a good idea to know your test results and keep a list of the medicines you take. How can you care for yourself at home? · Take your antibiotics as directed. Do not stop taking them just because you feel better. You need to take the full course of antibiotics. · Drink extra water and other fluids for the next day or two. This may help wash out the bacteria that are causing the infection. (If you have kidney, heart, or liver disease and have to limit fluids, talk with your doctor before you increase your fluid intake.) · Avoid drinks that are carbonated or have caffeine. They can irritate the bladder. · Urinate often. Try to empty your bladder each time. · To relieve pain, take a hot bath or lay a heating pad set on low over your lower belly or genital area. Never go to sleep with a heating pad in place. To prevent UTIs · Drink plenty of water each day. This helps you urinate often, which clears bacteria from your system. (If you have kidney, heart, or liver disease and have to limit fluids, talk with your doctor before you increase your fluid intake.) · Urinate when you need to. · Urinate right after you have sex. · Change sanitary pads often. · Avoid douches, bubble baths, feminine hygiene sprays, and other feminine hygiene products that have deodorants. · After going to the bathroom, wipe from front to back. When should you call for help? Call your doctor now or seek immediate medical care if: 
? · Symptoms such as fever, chills, nausea, or vomiting get worse or appear for the first time. ? · You have new pain in your back just below your rib cage. This is called flank pain. ? · There is new blood or pus in your urine. ? · You have any problems with your antibiotic medicine. ? Watch closely for changes in your health, and be sure to contact your doctor if: 
? · You are not getting better after taking an antibiotic for 2 days. ? · Your symptoms go away but then come back. Where can you learn more? Go to http://aram-klaus.info/. Enter L608 in the search box to learn more about \"Urinary Tract Infection in Women: Care Instructions. \" Current as of: May 12, 2017 Content Version: 11.4 © 0825-1116 Hornet Networks. Care instructions adapted under license by [employer] (which disclaims liability or warranty for this information). If you have questions about a medical condition or this instruction, always ask your healthcare professional. [place] [address] any warranty or liability for your use of this information. Introducing [facility] & HEALTH SERVICES! Dear Bernardo Tran: 
Thank you for requesting a [employer] account. Our records indicate that you already have an active [employer] account. You can access your account anytime at [email or web address]. [email or web address] Solutions Did you know that you can access your hospital and ER discharge instructions at any time in Tysdo? You can also review all of your test results from your hospital stay or ER visit. Additional Information If you have questions, please visit the Frequently Asked Questions section of the Tysdo website at [email or web address] Remember, Tysdo is NOT to be used for urgent needs. For medical emergencies, dial 911. Now available from your iPhone and Android! Please provide this summary of care documentation to your next provider. Your primary care clinician is listed as [address]. If you have any questions after today's visit, please call [phone].

## 2018-01-02 DIAGNOSIS — B00.9 HERPES SIMPLEX: ICD-10-CM

## 2018-01-03 RX ORDER — VALACYCLOVIR HYDROCHLORIDE 500 MG/1
500 TABLET, FILM COATED ORAL 2 TIMES DAILY
Qty: 6 TAB | Refills: 5 | Status: SHIPPED | OUTPATIENT
Start: 2018-01-03 | End: 2018-01-06

## 2018-03-21 ENCOUNTER — OFFICE VISIT (OUTPATIENT)
Dept: UROLOGY | Age: 33
End: 2018-03-21

## 2018-03-21 VITALS
WEIGHT: 170 LBS | SYSTOLIC BLOOD PRESSURE: 177 MMHG | BODY MASS INDEX: 31.28 KG/M2 | OXYGEN SATURATION: 99 % | TEMPERATURE: 98.2 F | HEART RATE: 101 BPM | HEIGHT: 62 IN | DIASTOLIC BLOOD PRESSURE: 113 MMHG

## 2018-03-21 DIAGNOSIS — N31.2 FLACCID BLADDER: Primary | ICD-10-CM

## 2018-03-21 LAB
BILIRUB UR QL STRIP: NEGATIVE
GLUCOSE UR-MCNC: NORMAL MG/DL
KETONES P FAST UR STRIP-MCNC: NEGATIVE MG/DL
PH UR STRIP: 6.5 [PH] (ref 4.6–8)
PROT UR QL STRIP: NORMAL
SP GR UR STRIP: 1.02 (ref 1–1.03)
UA UROBILINOGEN AMB POC: NORMAL (ref 0.2–1)
URINALYSIS CLARITY POC: CLEAR
URINALYSIS COLOR POC: YELLOW
URINE BLOOD POC: NORMAL
URINE LEUKOCYTES POC: NEGATIVE
URINE NITRITES POC: NEGATIVE

## 2018-03-21 RX ORDER — VALACYCLOVIR HYDROCHLORIDE 500 MG/1
TABLET, FILM COATED ORAL
Refills: 5 | COMMUNITY
Start: 2018-01-22 | End: 2018-04-27 | Stop reason: ALTCHOICE

## 2018-03-21 NOTE — PROGRESS NOTES
Ms. [facility] has a reminder for a \"due or due soon\" health maintenance. I have asked that she contact her primary care provider for follow-up on this health maintenance.

## 2018-03-21 NOTE — MR AVS SNAPSHOT
[address] [facility] [place] [address] [address] 
[phone] Patient: Rick Meyer MRN: [id number] :1985 Visit Information Date & Time Provider Department Dept. Phone Encounter #  
 3/21/2018  1:00 PM Eyvonne Harada, [address] [facility] [phone] Your Appointments 4/10/2018  9:20 AM  
Follow Up with Micheal Boland MD  
[facility]) Appt Note: . [place] [address] [address] [address] [phone] [address]  
  
    
 2018  1:00 PM  
Office Visit with Eyvonne Harada, MD  
[facility]) Appt Note: check up [address] [place] [address] [address]  
[phone] [address] [facility] [address] [place] [address] Upcoming Health Maintenance Date Due Pneumococcal 19-64 Medium Risk (1 of 1 - PPSV23) 2002 FOOT EXAM Q1 2017 HEMOGLOBIN A1C Q6M 10/20/2017 EYE EXAM RETINAL OR DILATED Q1 2017 MICROALBUMIN Q1 2018 MEDICARE YEARLY EXAM 3/14/2018 LIPID PANEL Q1 2018 PAP AKA CERVICAL CYTOLOGY 2020 DTaP/Tdap/Td series (2 - Td) 10/15/2025 Allergies as of 3/21/2018  Review Complete On: 3/21/2018 By: Eyvonne Harada, MD  
  
 Severity Noted Reaction Type Reactions Latex  2010    Hives Contrast Agent [Iodine]  2017    Other (comments) \"Burning with shooting pain\" Macrobid [Nitrofurantoin Monohyd/m-cryst]  2011   Intolerance Diarrhea Novolog Mix 70-30 [Insulin Asp Prt-insulin Aspart]  2013   Intolerance Nausea and Vomiting Current Immunizations  Reviewed on 10/15/2015 Name Date Influenza Vaccine (Quad) PF 10/20/2017, 10/3/2016, 10/15/2015, 2014 Influenza Vaccine PF 2012  1:10 PM  
 Tdap 10/15/2015 Not reviewed this visit You Were Diagnosed With   
  
 Codes Comments Flaccid bladder    -  Primary ICD-10-CM: N31.2 ICD-9-CM: 596.54 Vitals BP Pulse Temp Height(growth percentile) Weight(growth percentile) SpO2  
 (!) 177/113 (BP 1 Location: Left arm, BP Patient Position: Sitting) (!) 101 98.2 °F (36.8 °C) 5' 2\" (1.575 m) 170 lb (77.1 kg) 99% BMI OB Status Smoking Status 31.09 kg/m2 Having regular periods Never Smoker Vitals History BMI and BSA Data Body Mass Index Body Surface Area 31.09 kg/m 2 1.84 m 2 Preferred Pharmacy Pharmacy Name Phone [facility] [address] [place] [address] [phone] Your Updated Medication List  
  
   
This list is accurate as of 3/21/18  1:34 PM.  Always use your most recent med list.  
  
  
  
  
 AMARYL 4 mg tablet Generic drug:  glimepiride Take  by mouth two (2) times a day. Blood-Glucose Meter monitoring kit  
by Does Not Apply route two (2) times a day. butalbital-acetaminophen-caffeine -40 mg per tablet Commonly known as:  Jessica Mussel Take 1 Tab by mouth every six (6) hours as needed for Pain. Max Daily Amount: 4 Tabs. fluticasone 50 mcg/actuation nasal spray Commonly known as:  Jeppie Garrard SHAKE LIQUID AND USE 2 SPRAYS IN EACH NOSTRIL DAILY AS NEEDED FOR RHINITIS OR ALLERGIES  
  
 furosemide 20 mg tablet Commonly known as:  LASIX Take as needed daily for leg swelling. glucose blood VI test strips strip Commonly known as:  ONETOUCH ULTRA TEST  
by Does Not Apply route. One touch ultra mini test strips HumaLOG U-100 Insulin 100 unit/mL injection Generic drug:  insulin lispro  
by SubCUTAneous route. 20-40 units with meals  
  
 losartan 100 mg tablet Commonly known as:  COZAAR  
TAKE 1 TABLET BY MOUTH DAILY pregabalin 75 mg capsule Commonly known as:  Susa Hemp Take 1 Cap by mouth three (3) times daily. * propranolol LA 80 mg SR capsule Commonly known as:  INDERAL LA Take 1 Cap by mouth daily. Each evening * propranolol  mg SR capsule Commonly known as:  INDERAL LA Take 1 Cap by mouth daily. Every morning  
  
 rivaroxaban 15 mg Tab tablet Commonly known as:  Wayna Deer Take 1 Tab by mouth daily. simvastatin 40 mg tablet Commonly known as:  ZOCOR Take 1 Tab by mouth nightly. APPOINTMENT IS REQUIRED FOR CHOLESTEROL BEFORE NEXT REFILL  
  
 SYNTHROID PO Take  by mouth. valACYclovir 500 mg tablet Commonly known as:  VALTREX TK 1 T PO BID FOR 3 DAYS * Notice: This list has 2 medication(s) that are the same as other medications prescribed for you. Read the directions carefully, and ask your doctor or other care provider to review them with you. We Performed the Following AMB POC URINALYSIS DIP STICK AUTO W/O MICRO [81516 CPT(R)] Patient Instructions Urine Test: About This Test 
What is it? A urine test checks the color, clarity (clear or cloudy), odor, concentration, and acidity (pH) of your urine. It also checks your levels of protein, sugar, blood cells, or other substances in your urine. This test is sometimes called a urinalysis. Why is this test done? A urine test may be done: · To check for a disease or infection of the urinary tract. The urinary tract includes the kidneys, the tubes that carry urine from the kidneys to the bladder (ureters), and the bladder. It also includes the tube that carries urine from the bladder to outside the body (urethra). · To check the treatment of conditions such as diabetes, kidney stones, a urinary tract infection (UTI), high blood pressure, or some kidney or liver diseases. How can you prepare for the test? 
· Before the test, don't eat foods that can change the color of your urine. Examples of these include blackberries, beets, and rhubarb. · Don't do heavy exercise before the test. 
· Tell your doctor if you are menstruating or close to starting your period. Your doctor may want to wait to do the test. 
· Tell your doctor about all the nonprescription and prescription medicines and herbs or other supplements you take. Some of these can affect the results of this test. 
What happens during the test? 
A urine test can be done in your doctor's office, clinic, or lab. Or you may be asked to collect a urine sample at home. Then you can take it to the office or lab for testing. Clean-catch midstream urine collection · Wash your hands before you start. · If the cup you are given has a lid, remove it carefully. Set it down with the inner surface up. Don't touch the inside of the cup with your fingers. · Clean the area around your genitals. ¨ For men: Pull back the foreskin, if present. Clean the head of your penis with medicated towelettes or swabs. ¨ For women: Spread open the genital folds of skin with one hand. Then use medicated towelettes or swabs in your other hand to clean the area where urine comes out (the urethra). Wipe the area from front to back. · Start urinating into the toilet or urinal. A woman should hold apart the genital folds of skin while she urinates. · After the urine has flowed for several seconds, place the cup into the urine stream. Collect about 2 ounces of urine without stopping your flow of urine. · Don't touch the rim of the cup to your genital area. Don't get toilet paper, pubic hair, stool (feces), menstrual blood, or anything else in the urine sample. · Finish urinating into the toilet or urinal. 
· Carefully replace and tighten the lid on the cup, and then return it to the lab. If you are collecting the urine at home and can't get it to the lab in an hour, refrigerate it. Double-voided urine sample collection This method collects the urine your body is making right now. · Urinate into the toilet or urinal. Don't collect any of this urine. · Drink a large glass of water, and wait about 30 to 40 minutes. · Then get a urine sample. Follow the instructions above for collecting a clean-catch urine sample. · Take the urine sample to the lab. If you are collecting the urine at home and can't get it to the lab in an hour, refrigerate it. Follow-up care is a key part of your treatment and safety. Be sure to make and go to all appointments, and call your doctor if you are having problems. It's also a good idea to keep a list of the medicines you take. Ask your doctor when you can expect to have your test results. Where can you learn more? Go to http://aram-klaus.info/. Enter R266 in the search box to learn more about \"Urine Test: About This Test.\" Current as of: October 14, 2016 Content Version: 11.4 © 2492-5275 5skills. Care instructions adapted under license by Citizenside (which disclaims liability or warranty for this information). If you have questions about a medical condition or this instruction, always ask your healthcare professional. [place] [address] any warranty or liability for your use of this information. Introducing [facility] & HEALTH SERVICES! Dear Lucie Parikh: 
Thank you for requesting a Capeco account. Our records indicate that you already have an active Capeco account. You can access your account anytime at [email or web address]. [email or web address] Did you know that you can access your hospital and ER discharge instructions at any time in Capeco? You can also review all of your test results from your hospital stay or ER visit. Additional Information If you have questions, please visit the Frequently Asked Questions section of the Capeco website at [email or web address] Remember, Capeco is NOT to be used for urgent needs. For medical emergencies, dial 911. Now available from your iPhone and Android! Please provide this summary of care documentation to your next provider. Your primary care clinician is listed as [address]. If you have any questions after today's visit, please call [phone].

## 2018-03-21 NOTE — PATIENT INSTRUCTIONS
Urine Test: About This Test  What is it? A urine test checks the color, clarity (clear or cloudy), odor, concentration, and acidity (pH) of your urine. It also checks your levels of protein, sugar, blood cells, or other substances in your urine. This test is sometimes called a urinalysis. Why is this test done? A urine test may be done:  · To check for a disease or infection of the urinary tract. The urinary tract includes the kidneys, the tubes that carry urine from the kidneys to the bladder (ureters), and the bladder. It also includes the tube that carries urine from the bladder to outside the body (urethra). · To check the treatment of conditions such as diabetes, kidney stones, a urinary tract infection (UTI), high blood pressure, or some kidney or liver diseases. How can you prepare for the test?  · Before the test, don't eat foods that can change the color of your urine. Examples of these include blackberries, beets, and rhubarb. · Don't do heavy exercise before the test.  · Tell your doctor if you are menstruating or close to starting your period. Your doctor may want to wait to do the test.  · Tell your doctor about all the nonprescription and prescription medicines and herbs or other supplements you take. Some of these can affect the results of this test.  What happens during the test?  A urine test can be done in your doctor's office, clinic, or lab. Or you may be asked to collect a urine sample at home. Then you can take it to the office or lab for testing. Clean-catch midstream urine collection  · Wash your hands before you start. · If the cup you are given has a lid, remove it carefully. Set it down with the inner surface up. Don't touch the inside of the cup with your fingers. · Clean the area around your genitals. ¨ For men: Pull back the foreskin, if present. Clean the head of your penis with medicated towelettes or swabs.   ¨ For women: Spread open the genital folds of skin with one hand. Then use medicated towelettes or swabs in your other hand to clean the area where urine comes out (the urethra). Wipe the area from front to back. · Start urinating into the toilet or urinal. A woman should hold apart the genital folds of skin while she urinates. · After the urine has flowed for several seconds, place the cup into the urine stream. Collect about 2 ounces of urine without stopping your flow of urine. · Don't touch the rim of the cup to your genital area. Don't get toilet paper, pubic hair, stool (feces), menstrual blood, or anything else in the urine sample. · Finish urinating into the toilet or urinal.  · Carefully replace and tighten the lid on the cup, and then return it to the lab. If you are collecting the urine at home and can't get it to the lab in an hour, refrigerate it. Double-voided urine sample collection  This method collects the urine your body is making right now. · Urinate into the toilet or urinal. Don't collect any of this urine. · Drink a large glass of water, and wait about 30 to 40 minutes. · Then get a urine sample. Follow the instructions above for collecting a clean-catch urine sample. · Take the urine sample to the lab. If you are collecting the urine at home and can't get it to the lab in an hour, refrigerate it. Follow-up care is a key part of your treatment and safety. Be sure to make and go to all appointments, and call your doctor if you are having problems. It's also a good idea to keep a list of the medicines you take. Ask your doctor when you can expect to have your test results. Where can you learn more? Go to http://aram-klaus.info/. Enter R266 in the search box to learn more about \"Urine Test: About This Test.\"  Current as of: October 14, 2016  Content Version: 11.4  © 3908-2859 Healthwise, uStudio.  Care instructions adapted under license by [employer] (which disclaims liability or warranty for this information). If you have questions about a medical condition or this instruction, always ask your healthcare professional. [place] [address] any warranty or liability for your use of this information.

## 2018-03-22 NOTE — PROGRESS NOTES
Soha Lucia 27 y.o. female     Ms. 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 here today for follow-up of CIC  Patient reports initial difficulty performing CIC but is now able to drain bladder readily using a stiff disposable catheter-at times, experiences pain when passing catheter-this aspect of CIC inhibits patient's enthusiasm for doing CIC several times each day-now performing CIC every 3 or 4 days when bladder feels  unempty after voiding        Patient reports moderate success in performing self-catheterization but he is experiencing frequent episodes of urine gushing around the catheter when performing CIC-also, at times, obtain support drainage when catheter placement is felt to be adequate-initially presenting with complaint of  urine having a foul odor during the past few months associated with urgency frequency or dysuria  No flank pain or gross hematuria  Patient has no history of urinary tract disease, trauma, or surgery  Previous evaluation by urogynecology-Dr. 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 coli UTI 2014      Review of Systems:   CNS: Right hemisphere CVA/left hemiparesis/frequent headaches  Respiratory: No cough wheezing or shortness of breath  Cardiovascular: Atrial fibrillation Xarelto anticoagulation/no chest pain   Intestinal: No dyspepsia diarrhea/+ constipation  Urinary: Urinary urgency and frequency  Skeletal: No bone or joint pain  Endocrine: Diabetes  Other:     Allergies:    Allergies Allergen Reactions    Latex Hives    Contrast Agent [Iodine] Other (comments)     \"Burning with shooting pain\"    Macrobid [Nitrofurantoin Monohyd/M-Cryst] Diarrhea    Novolog Mix 70-30 [Insulin Asp Prt-Insulin Aspart] Nausea and Vomiting      Medications:    Current Outpatient Prescriptions   Medication Sig Dispense Refill    simvastatin (ZOCOR) 40 mg tablet Take 1 Tab by mouth nightly. APPOINTMENT IS REQUIRED FOR CHOLESTEROL BEFORE NEXT REFILL 30 Tab 0    propranolol LA (INDERAL LA) 80 mg SR capsule Take 1 Cap by mouth daily. Each evening 90 Cap 3    propranolol LA (INDERAL LA) 120 mg SR capsule Take 1 Cap by mouth daily. Every morning 90 Cap 3    rivaroxaban (XARELTO) 15 mg tab tablet Take 1 Tab by mouth daily. 30 Tab 11    butalbital-acetaminophen-caffeine (FIORICET, ESGIC) -40 mg per tablet Take 1 Tab by mouth every six (6) hours as needed for Pain. Max Daily Amount: 4 Tabs. 30 Tab 0    losartan (COZAAR) 100 mg tablet TAKE 1 TABLET BY MOUTH DAILY 90 Tab 0    glimepiride (AMARYL) 4 mg tablet Take  by mouth two (2) times a day.  LEVOTHYROXINE SODIUM (SYNTHROID PO) Take  by mouth.  insulin lispro (HUMALOG) 100 unit/mL injection by SubCUTAneous route. 20-40 units with meals      pregabalin (LYRICA) 75 mg capsule Take 1 Cap by mouth three (3) times daily. 90 Cap 3    Blood-Glucose Meter monitoring kit by Does Not Apply route two (2) times a day. 1 Kit 0    glucose blood VI test strips (ONE TOUCH ULTRA TEST) strip by Does Not Apply route. One touch ultra mini test strips 1 Package 11    valACYclovir (VALTREX) 500 mg tablet TK 1 T PO BID FOR 3 DAYS  5    fluticasone (FLONASE) 50 mcg/actuation nasal spray SHAKE LIQUID AND USE 2 SPRAYS IN EACH NOSTRIL DAILY AS NEEDED FOR RHINITIS OR ALLERGIES 1 Bottle 3    furosemide (LASIX) 20 mg tablet Take as needed daily for leg swelling.  30 Tab 3       Past Medical History:   Diagnosis Date    Cardiac echocardiogram 09/19/2016    [facility]:  [employer] difficult. Pt refused 2nd attempt at bubble study. EF 60%. No WMA. Mild conc LVH. RVSP 20 mmHg. No atrial shunting by Doppler.  Cardiovascular lower extremity venous duplex 06/20/2016    No DVT bilaterally.  Carotid duplex 09/19/2016    Mild < 50% bilateral ICA stenosis.  Cerebral artery occlusion with cerebral infarction ([address]     Diabetes ([address]     Diabetic neuropathy ([address]     Diabetic retinopathy     Hypercholesterolemia     Hypertension       Past Surgical History:   Procedure Laterality Date    HX ORTHOPAEDIC  January 2013    right toe nail surgery Dr. Rigo Umaña     Family History   Problem Relation Age of Onset    Diabetes Mother     Cancer Paternal Grandmother         Physical Examination: Well-nourished mature female in no apparent distress    Urinalysis: +++pro//+heme/negative nitrite    Impression: Flaccid diabetic bladder responding favorably to CIC        Plan: Can use CIC as scheduled    rtc 6 mo      More than 1/2 of this 15 minute visit was spent in counselling and coordination of care, as described above. Gill Nunez MD  -electronically signed-    PLEASE NOTE:  This document has been produced using voice recognition software. Unrecognized errors in transcription may be present.

## 2018-04-27 ENCOUNTER — OFFICE VISIT (OUTPATIENT)
Dept: FAMILY MEDICINE CLINIC | Age: 33
End: 2018-04-27

## 2018-04-27 VITALS
RESPIRATION RATE: 20 BRPM | OXYGEN SATURATION: 96 % | DIASTOLIC BLOOD PRESSURE: 76 MMHG | HEIGHT: 62 IN | HEART RATE: 96 BPM | TEMPERATURE: 99.3 F | BODY MASS INDEX: 31.47 KG/M2 | SYSTOLIC BLOOD PRESSURE: 110 MMHG | WEIGHT: 171 LBS

## 2018-04-27 DIAGNOSIS — R07.0 BURNING SENSATION OF THROAT: Primary | ICD-10-CM

## 2018-04-27 RX ORDER — BUTALBITAL, ACETAMINOPHEN AND CAFFEINE 50; 325; 40 MG/1; MG/1; MG/1
1 TABLET ORAL
Qty: 30 TAB | Refills: 0 | Status: SHIPPED | OUTPATIENT
Start: 2018-04-27 | End: 2019-02-27 | Stop reason: SDUPTHER

## 2018-04-27 RX ORDER — VALACYCLOVIR HYDROCHLORIDE 500 MG/1
TABLET, FILM COATED ORAL
Qty: 5 TAB | Refills: 3 | Status: CANCELLED | OUTPATIENT
Start: 2018-04-27

## 2018-04-27 RX ORDER — VALACYCLOVIR HYDROCHLORIDE 1 G/1
1000 TABLET, FILM COATED ORAL DAILY
Qty: 5 TAB | Refills: 5 | Status: SHIPPED | OUTPATIENT
Start: 2018-04-27 | End: 2018-05-02

## 2018-04-27 NOTE — MR AVS SNAPSHOT
[facility] 
 
 
 [address], [place] [address] [address] 
[phone] Patient: Quinton Saavedra MRN: [id number] :[id number] Visit Information Date & Time Provider Department Dept. Phone Encounter #  
 2018  2:30 PM Mikel Steiner, [address] Adh[facility]ari [phone] [id number] Follow-up Instructions Return in about 1 week (around 2018) for wellness exam.  
  
Your Appointments 2018 10:00 AM  
Complete Physical with Mikel Steiner NP [facility] (DONNA [facility]) Appt Note: [facility] Exam  
 [address], [place] [address] [address]  
[phone]  
  
   
 [address] [facility]  
  
    
 2018  1:00 PM  
Office Visit with Julianne Cox MD  
[facility]) Appt Note: check up [address] [place] [address] [address]  
[phone] [address] [facility] [address] Upcoming Health Maintenance Date Due Pneumococcal 19-64 Medium Risk (1 of 1 - PPSV23) 2002 FOOT EXAM Q1 2017 HEMOGLOBIN A1C Q6M 10/20/2017 EYE EXAM RETINAL OR DILATED Q1 2017 MICROALBUMIN Q1 2018 MEDICARE YEARLY EXAM 3/14/2018 LIPID PANEL Q1 2018 Influenza Age 5 to Adult 2018 PAP AKA CERVICAL CYTOLOGY 2020 DTaP/Tdap/Td series (2 - Td) 10/15/2025 Allergies as of 2018  Review Complete On: 2018 By: Mikel Steiner NP Severity Noted Reaction Type Reactions Latex  2010    Hives Contrast Agent [Iodine]  2017    Other (comments) \"Burning with shooting pain\" Macrobid [Nitrofurantoin Monohyd/m-cryst]  2011   Intolerance Diarrhea Novolog Mix 70-30 [Insulin Asp Prt-insulin Aspart]  2013   Intolerance Nausea and Vomiting Current Immunizations  Reviewed on 10/15/2015 Name Date Influenza Vaccine (Quad) PF 10/20/2017, 10/3/2016, 10/15/2015, 9/11/2014 Influenza Vaccine PF 12/11/2012  1:10 PM  
 Tdap 10/15/2015 Not reviewed this visit You Were Diagnosed With   
  
 Codes Comments Burning sensation of throat    -  Primary ICD-10-CM: R07.0 ICD-9-CM: 784.1 Vitals BP Pulse Temp Resp Height(growth percentile) Weight(growth percentile) 110/76 (BP 1 Location: Left arm, BP Patient Position: Sitting) 96 99.3 °F (37.4 °C) (Oral) 20 5' 2\" (1.575 m) 171 lb (77.6 kg) LMP SpO2 BMI OB Status Smoking Status 04/02/2018 96% 31.28 kg/m2 Having regular periods Never Smoker BMI and BSA Data Body Mass Index Body Surface Area  
 31.28 kg/m 2 1.84 m 2 Preferred Pharmacy Pharmacy Name Phone [address] [address] [phone] Your Updated Medication List  
  
   
This list is accurate as of 4/27/18  3:11 PM.  Always use your most recent med list.  
  
  
  
  
 Blood-Glucose Meter monitoring kit  
by Does Not Apply route two (2) times a day. butalbital-acetaminophen-caffeine -40 mg per tablet Commonly known as:  Rise Laurence Take 1 Tab by mouth every six (6) hours as needed for Pain. furosemide 20 mg tablet Commonly known as:  LASIX Take as needed daily for leg swelling. glucose blood VI test strips strip Commonly known as:  ONETOUCH ULTRA TEST  
by Does Not Apply route. One touch ultra mini test strips HumaLOG U-100 Insulin 100 unit/mL injection Generic drug:  insulin lispro  
by SubCUTAneous route. 20-40 units with meals  
  
 losartan 100 mg tablet Commonly known as:  COZAAR Take 1 Tab by mouth daily. APPOINTMENT NEEDED PRIOR TO NEXT PATIENT  
  
 pregabalin 75 mg capsule Commonly known as:  Magan Burnham Take 1 Cap by mouth three (3) times daily. * propranolol LA 80 mg SR capsule Commonly known as:  INDERAL LA  
 Take 1 Cap by mouth daily. Each evening * propranolol  mg SR capsule Commonly known as:  INDERAL LA Take 1 Cap by mouth daily. Every morning  
  
 rivaroxaban 15 mg Tab tablet Commonly known as:  Davene Mihai Take 1 Tab by mouth daily. simvastatin 40 mg tablet Commonly known as:  ZOCOR Take 1 Tab by mouth nightly. APPOINTMENT IS REQUIRED BEFORE NEXT REFILL  
  
 SYNTHROID PO Take  by mouth. valACYclovir 1 gram tablet Commonly known as:  VALTREX Take 1 Tab by mouth daily for 5 days. * Notice: This list has 2 medication(s) that are the same as other medications prescribed for you. Read the directions carefully, and ask your doctor or other care provider to review them with you. Prescriptions Sent to Pharmacy Refills  
 butalbital-acetaminophen-caffeine (FIORICET, ESGIC) -40 mg per tablet 0 Sig: Take 1 Tab by mouth every six (6) hours as needed for Pain. Class: Normal  
 Pharmacy: [facility] [phone] Ph #: [phone] Route: Oral  
 valACYclovir (VALTREX) 1 gram tablet 5 Sig: Take 1 Tab by mouth daily for 5 days. Class: Normal  
 Pharmacy: [facility] [phone] Ph #: [phone] Route: Oral  
  
Follow-up Instructions Return in about 1 week (around 5/4/2018) for wellness exam.  
  
  
Introducing [facility]! Dear Alvin Max: 
Thank you for requesting a [employer] account. Our records indicate that you already have an active [employer] account. You can access your account anytime at [email or web address] Did you know that you can access your hospital and ER discharge instructions at any time in [employer]? You can also review all of your test results from your hospital stay or ER visit. Additional Information If you have questions, please visit the Frequently Asked Questions section of the [employer] website at [email or web address] Remember, [employer] is NOT to be used for urgent needs. For medical emergencies, dial 911. Now available from your iPhone and Android! Please provide this summary of care documentation to your next provider. Your primary care clinician is listed as [address]. If you have any questions after today's visit, please call [phone].

## 2018-04-27 NOTE — PROGRESS NOTES
HISTORY OF PRESENT ILLNESS  Adryan Singh is a 26 y.o. female. HPI Comments: Patient states Wednesday she noticed she was having burning in her throat. States she noticed she has the same symptoms last month. Comments she has burning with exhaling only. Comments she feels she can't breath out. Reports symptoms resolved on their own. Comments she was concerned because this was the second episode she had. Allergies   Allergen Reactions    Latex Hives    Contrast Agent [Iodine] Other (comments)     \"Burning with shooting pain\"    Macrobid [Nitrofurantoin Monohyd/M-Cryst] Diarrhea    Novolog Mix 70-30 [Insulin Asp Prt-Insulin Aspart] Nausea and Vomiting     Current Outpatient Prescriptions   Medication Sig Dispense Refill    simvastatin (ZOCOR) 40 mg tablet Take 1 Tab by mouth nightly. APPOINTMENT IS REQUIRED BEFORE NEXT REFILL 15 Tab 0    losartan (COZAAR) 100 mg tablet Take 1 Tab by mouth daily. APPOINTMENT NEEDED PRIOR TO NEXT PATIENT 90 Tab 0    rivaroxaban (XARELTO) 15 mg tab tablet Take 1 Tab by mouth daily. 30 Tab 11    valACYclovir (VALTREX) 500 mg tablet TK 1 T PO BID FOR 3 DAYS  5    propranolol LA (INDERAL LA) 80 mg SR capsule Take 1 Cap by mouth daily. Each evening 90 Cap 3    propranolol LA (INDERAL LA) 120 mg SR capsule Take 1 Cap by mouth daily. Every morning 90 Cap 3    butalbital-acetaminophen-caffeine (FIORICET, ESGIC) -40 mg per tablet Take 1 Tab by mouth every six (6) hours as needed for Pain. Max Daily Amount: 4 Tabs. 30 Tab 0    furosemide (LASIX) 20 mg tablet Take as needed daily for leg swelling. 30 Tab 3    LEVOTHYROXINE SODIUM (SYNTHROID PO) Take  by mouth.  insulin lispro (HUMALOG) 100 unit/mL injection by SubCUTAneous route. 20-40 units with meals      pregabalin (LYRICA) 75 mg capsule Take 1 Cap by mouth three (3) times daily. 90 Cap 3    Blood-Glucose Meter monitoring kit by Does Not Apply route two (2) times a day.  1 Kit 0    glucose blood VI test strips (ONE TOUCH ULTRA TEST) strip by Does Not Apply route. One touch ultra mini test strips 1 Package 11     Past Medical History:   Diagnosis Date    Cardiac echocardiogram 09/19/2016    [facility]:  [employer] difficult. Pt refused 2nd attempt at bubble study. EF 60%. No WMA. Mild conc LVH. RVSP 20 mmHg. No atrial shunting by Doppler.  Cardiovascular lower extremity venous duplex 06/20/2016    No DVT bilaterally.  Carotid duplex 09/19/2016    Mild < 50% bilateral ICA stenosis.  Cerebral artery occlusion with cerebral infarction ([facility]     Diabetes ([facility]     Diabetic neuropathy ([facility])     Diabetic retinopathy     Hypercholesterolemia     Hypertension      Review of Systems   Constitutional: Negative for chills and fever. HENT: Negative for congestion, ear pain, sinus pain and sore throat. Burning sensation in throat   Respiratory: Negative for cough, shortness of breath and wheezing. Cardiovascular: Negative for chest pain. Visit Vitals    /76 (BP 1 Location: Left arm, BP Patient Position: Sitting)    Pulse 96    Temp 99.3 °F (37.4 °C) (Oral)    Resp 20    Ht 5' 2\" (1.575 m)    Wt 171 lb (77.6 kg)    LMP 04/02/2018    SpO2 96%    BMI 31.28 kg/m2     Physical Exam   Constitutional: She appears well-developed and well-nourished. No distress. HENT:   Head: Normocephalic and atraumatic. Right Ear: A middle ear effusion is present. Left Ear: A middle ear effusion is present. Nose: Mucosal edema (turbinates boggy wtih clear secretions) present. Right sinus exhibits no maxillary sinus tenderness and no frontal sinus tenderness. Left sinus exhibits no maxillary sinus tenderness and no frontal sinus tenderness. Mouth/Throat: Uvula is midline, oropharynx is clear and moist and mucous membranes are normal. No oropharyngeal exudate. Cobblestoning to post oropharynx   Neck: Normal range of motion. Neck supple.    Cardiovascular: Normal rate, regular rhythm and normal heart sounds. Exam reveals no gallop and no friction rub. No murmur heard. Pulmonary/Chest: Effort normal. She has no wheezes. She has no rales. ASSESSMENT and PLAN    ICD-10-CM ICD-9-CM    1. Burning sensation of throat R07.0 784.1    Advised to use flonase and antihistamine and also a humidifier for symptoms  I have discussed the diagnosis with the patient and the intended plan as seen in the above orders. The patient has received an after-visit summary and questions were answered concerning future plans. I have discussed medication side effects and warnings with the patient as well. Patient agreeable with above plan and verbalizes understanding.   Follow-up Disposition:  Return in about 1 week (around 5/4/2018) for wellness exam.

## 2018-04-27 NOTE — PROGRESS NOTES
Chief Complaint   Patient presents with    Breathing Problem     burning in nose while breathing     1. Have you been to the ER, urgent care clinic since your last visit? Hospitalized since your last visit? No    2. Have you seen or consulted any other health care providers outside of the [facility] since your last visit? Include any pap smears or colon screening.  No

## 2018-05-04 ENCOUNTER — OFFICE VISIT (OUTPATIENT)
Dept: FAMILY MEDICINE CLINIC | Age: 33
End: 2018-05-04

## 2018-05-04 ENCOUNTER — HOSPITAL ENCOUNTER (OUTPATIENT)
Dept: LAB | Age: 33
Discharge: HOME | End: 2018-05-04

## 2018-05-04 VITALS
HEART RATE: 90 BPM | OXYGEN SATURATION: 98 % | HEIGHT: 62 IN | WEIGHT: 171.2 LBS | BODY MASS INDEX: 31.5 KG/M2 | TEMPERATURE: 99 F | DIASTOLIC BLOOD PRESSURE: 75 MMHG | RESPIRATION RATE: 16 BRPM | SYSTOLIC BLOOD PRESSURE: 141 MMHG

## 2018-05-04 DIAGNOSIS — K31.84 DIABETIC GASTROPARESIS ASSOCIATED WITH TYPE 2 DIABETES MELLITUS (HCC): ICD-10-CM

## 2018-05-04 DIAGNOSIS — E11.9 ENCOUNTER FOR DIABETIC FOOT EXAM (HCC): ICD-10-CM

## 2018-05-04 DIAGNOSIS — I10 ESSENTIAL HYPERTENSION: ICD-10-CM

## 2018-05-04 DIAGNOSIS — E11.43 DIABETIC GASTROPARESIS ASSOCIATED WITH TYPE 2 DIABETES MELLITUS (HCC): ICD-10-CM

## 2018-05-04 DIAGNOSIS — R80.9 MICROALBUMINURIA: ICD-10-CM

## 2018-05-04 DIAGNOSIS — E78.00 PURE HYPERCHOLESTEROLEMIA: ICD-10-CM

## 2018-05-04 DIAGNOSIS — Z00.00 MEDICARE ANNUAL WELLNESS VISIT, SUBSEQUENT: Primary | ICD-10-CM

## 2018-05-04 LAB
GLUCOSE DOSE-GTT, POCT, GLDSPOCT: 89
HBA1C MFR BLD HPLC: 8.5 %

## 2018-05-04 PROCEDURE — 99001 SPECIMEN HANDLING PT-LAB: CPT | Performed by: NURSE PRACTITIONER

## 2018-05-04 NOTE — MR AVS SNAPSHOT
Monique Appiah 
 
 
 [id number] [address] 
[phone] Patient: Rashmi Jordan MRN: [id number] :1985 Visit Information Date & Time Provider Department Dept. Phone Encounter #  
 2018 10:00 AM Sandro Conway  [place] [id number] Follow-up Instructions Return in about 3 months (around 2018) for HLD/HTN. Your Appointments 2018  1:00 PM  
Office Visit with Meek Fregoso MD  
[facility] [address] [facility]) Appt Note: check up [address] [place] [address] [address]  
[phone] [address] [place] [id number] Upcoming Health Maintenance Date Due Pneumococcal 19-64 Medium Risk (1 of 1 - PPSV23) 2002 FOOT EXAM Q1 2017 HEMOGLOBIN A1C Q6M 10/20/2017 EYE EXAM RETINAL OR DILATED Q1 2017 MICROALBUMIN Q1 2018 MEDICARE YEARLY EXAM 3/14/2018 LIPID PANEL Q1 2018 Influenza Age 5 to Adult 2018 PAP AKA CERVICAL CYTOLOGY 2020 DTaP/Tdap/Td series (2 - Td) 10/15/2025 Allergies as of 2018  Review Complete On: 2018 By: Terry Catalan LPN Severity Noted Reaction Type Reactions Latex  2010    Hives Contrast Agent [Iodine]  2017    Other (comments) \"Burning with shooting pain\" Macrobid [Nitrofurantoin Monohyd/m-cryst]  2011   Intolerance Diarrhea Novolog Mix 70-30 [Insulin Asp Prt-insulin Aspart]  2013   Intolerance Nausea and Vomiting Current Immunizations  Reviewed on 10/15/2015 Name Date Influenza Vaccine (Quad) PF 10/20/2017, 10/3/2016, 10/15/2015, 2014 Influenza Vaccine PF 2012  1:10 PM  
 Tdap 10/15/2015 Not reviewed this visit You Were Diagnosed With   
  
 Codes Comments Medicare annual wellness visit, subsequent    -  Primary ICD-10-CM: Z00.00 ICD-9-CM: V70.0 Diabetic gastroparesis associated with type 2 diabetes mellitus ([facility] [address]     ICD-10-CM: E11.43, K31.84 ICD-9-CM: 250.60, 536.3 Pure hypercholesterolemia     ICD-10-CM: E78.00 ICD-9-CM: 272.0 Essential hypertension     ICD-10-CM: I10 
ICD-9-CM: 401.9 Vitals BP Pulse Temp Resp Height(growth percentile) Weight(growth percentile) 141/75 (BP 1 Location: Left arm, BP Patient Position: Sitting) 90 99 °F (37.2 °C) (Oral) 16 5' 2\" (1.575 m) 171 lb 3.2 oz (77.7 kg) LMP SpO2 BMI OB Status Smoking Status 04/28/2018 98% 31.31 kg/m2 Having regular periods Never Smoker Vitals History BMI and BSA Data Body Mass Index Body Surface Area  
 31.31 kg/m 2 1.84 m 2 Preferred Pharmacy Pharmacy Name Phone [facility] [address] [place] [address] [phone] Your Updated Medication List  
  
   
This list is accurate as of 5/4/18 10:47 AM.  Always use your most recent med list.  
  
  
  
  
 Blood-Glucose Meter monitoring kit  
by Does Not Apply route two (2) times a day. butalbital-acetaminophen-caffeine -40 mg per tablet Commonly known as:  Latrice Alexandra Take 1 Tab by mouth every six (6) hours as needed for Pain. furosemide 20 mg tablet Commonly known as:  LASIX Take as needed daily for leg swelling. glucose blood VI test strips strip Commonly known as:  ONETOUCH ULTRA TEST  
by Does Not Apply route. One touch ultra mini test strips HumaLOG U-100 Insulin 100 unit/mL injection Generic drug:  insulin lispro  
by SubCUTAneous route. 20-40 units with meals  
  
 losartan 100 mg tablet Commonly known as:  COZAAR Take 1 Tab by mouth daily. APPOINTMENT NEEDED PRIOR TO NEXT PATIENT  
  
 pregabalin 75 mg capsule Commonly known as:  Jayne Otoole Take 1 Cap by mouth three (3) times daily. * propranolol LA 80 mg SR capsule Commonly known as:  INDERAL LA Take 1 Cap by mouth daily. Each evening * propranolol  mg SR capsule Commonly known as:  INDERAL LA Take 1 Cap by mouth daily. Every morning  
  
 rivaroxaban 15 mg Tab tablet Commonly known as:  Jose Flores Take 1 Tab by mouth daily. simvastatin 40 mg tablet Commonly known as:  ZOCOR Take 1 Tab by mouth nightly. APPOINTMENT IS REQUIRED BEFORE NEXT REFILL  
  
 SYNTHROID PO Take  by mouth. * Notice: This list has 2 medication(s) that are the same as other medications prescribed for you. Read the directions carefully, and ask your doctor or other care provider to review them with you. We Performed the Following AMB POC GLUCOSE TEST [16369 CPT(R)] AMB POC HEMOGLOBIN A1C [16685 CPT(R)] Follow-up Instructions Return in about 3 months (around 8/4/2018) for HLD/HTN. To-Do List   
 05/04/2018 Lab:  LIPID PANEL   
  
 05/04/2018 Lab:  METABOLIC PANEL, COMPREHENSIVE   
  
 05/04/2018 Lab:  MICROALBUMIN, UR, RAND W/ MICROALB/CREAT RATIO Patient Instructions A Healthy Lifestyle: Care Instructions Your Care Instructions A healthy lifestyle can help you feel good, stay at a healthy weight, and have plenty of energy for both work and play. A healthy lifestyle is something you can share with your whole family. A healthy lifestyle also can lower your risk for serious health problems, such as high blood pressure, heart disease, and diabetes. You can follow a few steps listed below to improve your health and the health of your family. Follow-up care is a key part of your treatment and safety. Be sure to make and go to all appointments, and call your doctor if you are having problems. It's also a good idea to know your test results and keep a list of the medicines you take. How can you care for yourself at home? · Do not eat too much sugar, fat, or fast foods.  You can still have dessert and treats now and then. The goal is moderation. · Start small to improve your eating habits. Pay attention to portion sizes, drink less juice and soda pop, and eat more fruits and vegetables. ¨ Eat a healthy amount of food. A 3-ounce serving of meat, for example, is about the size of a deck of cards. Fill the rest of your plate with vegetables and whole grains. ¨ Limit the amount of soda and sports drinks you have every day. Drink more water when you are thirsty. ¨ Eat at least 5 servings of fruits and vegetables every day. It may seem like a lot, but it is not hard to reach this goal. A serving or helping is 1 piece of fruit, 1 cup of vegetables, or 2 cups of leafy, raw vegetables. Have an apple or some carrot sticks as an afternoon snack instead of a candy bar. Try to have fruits and/or vegetables at every meal. 
· Make exercise part of your daily routine. You may want to start with simple activities, such as walking, bicycling, or slow swimming. Try to be active 30 to 60 minutes every day. You do not need to do all 30 to 60 minutes all at once. For example, you can exercise 3 times a day for 10 or 20 minutes. Moderate exercise is safe for most people, but it is always a good idea to talk to your doctor before starting an exercise program. 
· Keep moving. Anisa Juarez the lawn, work in the garden, or [employer]. Take the stairs instead of the elevator at work. · If you smoke, quit. People who smoke have an increased risk for heart attack, stroke, cancer, and other lung illnesses. Quitting is hard, but there are ways to boost your chance of quitting tobacco for good. ¨ Use nicotine gum, patches, or lozenges. ¨ Ask your doctor about stop-smoking programs and medicines. ¨ Keep trying.  
In addition to reducing your risk of diseases in the future, you will notice some benefits soon after you stop using tobacco. If you have shortness of breath or asthma symptoms, they will likely get better within a few weeks after you quit. · Limit how much alcohol you drink. Moderate amounts of alcohol (up to 2 drinks a day for men, 1 drink a day for women) are okay. But drinking too much can lead to liver problems, high blood pressure, and other health problems. Family health If you have a family, there are many things you can do together to improve your health. · Eat meals together as a family as often as possible. · Eat healthy foods. This includes fruits, vegetables, lean meats and dairy, and whole grains. · Include your family in your fitness plan. Most people think of activities such as jogging or tennis as the way to fitness, but there are many ways you and your family can be more active. Anything that makes you breathe hard and gets your heart pumping is exercise. Here are some tips: 
¨ Walk to do errands or to take your child to school or the bus. ¨ Go for a family bike ride after dinner instead of watching TV. Where can you learn more? Go to http://aram-klaus.info/. Enter I659 in the search box to learn more about \"A Healthy Lifestyle: Care Instructions. \" Current as of: May 12, 2017 Content Version: 11.4 © 3339-0028 [employer]. Care instructions adapted under license by [employer] (which disclaims liability or warranty for this information). If you have questions about a medical condition or this instruction, always ask your healthcare professional. [place] [address] any warranty or liability for your use of this information. Medicare Wellness Visit, Female The best way to live healthy is to have a healthy lifestyle by eating a well-balanced diet, exercising regularly, limiting alcohol and stopping smoking. Regular physical exams and screening tests are another way to keep healthy. Preventive exams provided by your health care provider can find health problems before they become diseases or illnesses.  Preventive services including immunizations, screening tests, monitoring and exams can help you take care of your own health. All people over age 72 should have a pneumovax  and and a prevnar shot to prevent pneumonia. These are once in a lifetime unless you and your provider decide differently. All people over 65 should have a yearly flu shot and a tetanus vaccine every 10 years. A bone mass density to screen for osteoporosis or thinning of the bones should be done every 2 years after 65. Screening for diabetes mellitus with a blood sugar test should be done every year. Glaucoma is a disease of the eye due to increased ocular pressure that can lead to blindness and it should be done every year by an eye professional. 
 
Cardiovascular screening tests that check for elevated lipids (fatty part of blood) which can lead to heart disease and strokes should be done every 5 years. Colorectal screening that evaluates for blood or polyps in your colon should be done yearly as a stool test or every five years as a flexible sigmoidoscope or every 10 years as a colonoscopy up to age 76. Breast cancer screening with a mammogram is recommended biennially  for women age 54-69. Screening for cervical cancer with a pap smear and pelvic exam is recommended for women after age 72 years every 2 years up to age 79 or when the provider and patient decide to stop. If there is a history of cervical abnormalities or other increased risk for cancer then the test is recommended yearly. Hepatitis C screening is also recommended for anyone born between 80 through [address]. A shingles vaccine is also recommended once in a lifetime after age 61. Your Medicare Wellness Exam is recommended annually. Here is a list of your current Health Maintenance items with a due date: 
Health Maintenance Due Topic Date Due  Pneumococcal Vaccine (1 of 1 - PPSV23) 04/08/2002 [facility] Diabetic Foot Care  06/19/2017  Hemoglobin A1C    10/20/2017 [facility] Exam  11/04/2017  Albumin Urine Test  01/12/2018 [facility] Annual Well Visit  03/14/2018  Cholesterol Test   04/20/2018 Medicare Wellness Visit, Female The best way to live healthy is to have a healthy lifestyle by eating a well-balanced diet, exercising regularly, limiting alcohol and stopping smoking. Regular physical exams and screening tests are another way to keep healthy. Preventive exams provided by your health care provider can find health problems before they become diseases or illnesses. Preventive services including immunizations, screening tests, monitoring and exams can help you take care of your own health. All people over age 72 should have a pneumovax  and and a prevnar shot to prevent pneumonia. These are once in a lifetime unless you and your provider decide differently. All people over 65 should have a yearly flu shot and a tetanus vaccine every 10 years. A bone mass density to screen for osteoporosis or thinning of the bones should be done every 2 years after 65. Screening for diabetes mellitus with a blood sugar test should be done every year. Glaucoma is a disease of the eye due to increased ocular pressure that can lead to blindness and it should be done every year by an eye professional. 
 
Cardiovascular screening tests that check for elevated lipids (fatty part of blood) which can lead to heart disease and strokes should be done every 5 years. Colorectal screening that evaluates for blood or polyps in your colon should be done yearly as a stool test or every five years as a flexible sigmoidoscope or every 10 years as a colonoscopy up to age 76. Breast cancer screening with a mammogram is recommended biennially  for women age 54-69.  
 
Screening for cervical cancer with a pap smear and pelvic exam is recommended for women after age 72 years every 2 years up to age 79 or when the provider and patient decide to stop. If there is a history of cervical abnormalities or other increased risk for cancer then the test is recommended yearly. Hepatitis C screening is also recommended for anyone born between 80 through [address]. A shingles vaccine is also recommended once in a lifetime after age 61. Your Medicare Wellness Exam is recommended annually. Here is a list of your current Health Maintenance items with a due date: 
Health Maintenance Due Topic Date Due  Pneumococcal Vaccine (1 of 1 - PPSV23) 04/08/2002 [facility] Diabetic Foot Care  06/19/2017  Hemoglobin A1C    10/20/2017 [facility] Eye Exam  11/04/2017  Albumin Urine Test  01/12/2018 [facility] Annual Well Visit  03/14/2018  Cholesterol Test   04/20/2018 Introducing [facility]! Dear Erin Saeed: 
Thank you for requesting a [employer] account. Our records indicate that you already have an active [employer] account. You can access your account anytime at [email or web address] Did you know that you can access your hospital and ER discharge instructions at any time in [employer]? You can also review all of your test results from your hospital stay or ER visit. Additional Information If you have questions, please visit the Frequently Asked Questions section of the [employer] website at [email or web address] Remember, [employer] is NOT to be used for urgent needs. For medical emergencies, dial 911. Now available from your iPhone and Android! Please provide this summary of care documentation to your next provider. Your primary care clinician is listed as [address]. If you have any questions after today's visit, please call [phone].

## 2018-05-04 NOTE — PROGRESS NOTES
Chief Complaint   Patient presents with    Well Woman     Patient is here today for women wellness. 1. Have you been to the ER, urgent care clinic since your last visit? Hospitalized since your last visit? No    2. Have you seen or consulted any other health care providers outside of the [address] [place] [address] since your last visit? Include any pap smears or colon screening. No      This is the Subsequent Medicare Annual Wellness Exam, performed 12 months or more after the Initial AWV or the last Subsequent AWV    I have reviewed the patient's medical history in detail and updated the computerized patient record. History   Patient reports she has been doing well. She reports she is going to schedule an appt with her ophthalmologist this week. Further comments she has not seen the nephrologist in a while and needs a referral to another provider. Past Medical History:   Diagnosis Date    Cardiac echocardiogram 09/19/2016    [facility]:  Tech difficult. Pt refused 2nd attempt at bubble study. EF 60%. No WMA. Mild conc LVH. RVSP 20 mmHg. No atrial shunting by Doppler.  Cardiovascular lower extremity venous duplex 06/20/2016    No DVT bilaterally.  Carotid duplex 09/19/2016    Mild < 50% bilateral ICA stenosis.  Cerebral artery occlusion with cerebral infarction ([address]     Diabetes ([address]     Diabetic neuropathy ([address]     Diabetic retinopathy     Hypercholesterolemia     Hypertension       Past Surgical History:   Procedure Laterality Date    HX ORTHOPAEDIC  January 2013    right toe nail surgery Dr. Dante Forte     Current Outpatient Prescriptions   Medication Sig Dispense Refill    butalbital-acetaminophen-caffeine (FIORICET, ESGIC) -40 mg per tablet Take 1 Tab by mouth every six (6) hours as needed for Pain. 30 Tab 0    simvastatin (ZOCOR) 40 mg tablet Take 1 Tab by mouth nightly.  APPOINTMENT IS REQUIRED BEFORE NEXT REFILL 15 Tab 0    losartan (COZAAR) 100 mg tablet Take 1 Tab by mouth daily. APPOINTMENT NEEDED PRIOR TO NEXT PATIENT 90 Tab 0    rivaroxaban (XARELTO) 15 mg tab tablet Take 1 Tab by mouth daily. 30 Tab 11    propranolol LA (INDERAL LA) 80 mg SR capsule Take 1 Cap by mouth daily. Each evening 90 Cap 3    propranolol LA (INDERAL LA) 120 mg SR capsule Take 1 Cap by mouth daily. Every morning 90 Cap 3    furosemide (LASIX) 20 mg tablet Take as needed daily for leg swelling. 30 Tab 3    LEVOTHYROXINE SODIUM (SYNTHROID PO) Take  by mouth.  insulin lispro (HUMALOG) 100 unit/mL injection by SubCUTAneous route. 20-40 units with meals      pregabalin (LYRICA) 75 mg capsule Take 1 Cap by mouth three (3) times daily. 90 Cap 3    Blood-Glucose Meter monitoring kit by Does Not Apply route two (2) times a day. 1 Kit 0    glucose blood VI test strips (ONE TOUCH ULTRA TEST) strip by Does Not Apply route.  One touch ultra mini test strips 1 Package 11     Allergies   Allergen Reactions    Latex Hives    Contrast Agent [Iodine] Other (comments)     \"Burning with shooting pain\"   Magdy Doherty [Nitrofurantoin Monohyd/M-Cryst] Diarrhea    Novolog Mix 70-30 [Insulin Asp Prt-Insulin Aspart] Nausea and Vomiting     Family History   Problem Relation Age of Onset    Diabetes Mother     Cancer Paternal Grandmother      Social History   Substance Use Topics    Smoking status: Never Smoker    Smokeless tobacco: Never Used    Alcohol use No     Patient Active Problem List   Diagnosis Code    Neuropathy G62.9    Diabetes mellitus ([facility] [address] E11.9    Eye problems H57.9    Toe pain M79.676    Hyperlipidemia E78.5    Diabetic gastroparesis associated with type 2 diabetes mellitus (HCC) E11.43, K31.84    Hypovitaminosis D E55.9    Paroxysmal atrial fibrillation (HCC) I48.0    Chronic anticoagulation Z79.01    Transient cerebral ischemia G45.9    Renal insufficiency N28.9    Bad odor of urine R82.90     Objective:   General appearance - alert, well appearing, and in no distress  Neck - supple, no significant adenopathy, carotids upstroke normal bilaterally, no bruits  Chest - clear to auscultation, no wheezes, rales or rhonchi, symmetric air entry  Heart - normal rate, regular rhythm, normal S1, S2, no murmurs, rubs, clicks or gallops  Abdomen - soft, nontender, nondistended, no masses or organomegaly  Extremities - peripheral pulses normal, no pedal edema, no clubbing or cyanosis  Skin - normal coloration and turgor, no rashes, no suspicious skin lesions noted  Diabetic foot exam:     Left: Reflexes 2+     Vibratory sensation normal    Filament test normal sensation with micro filament   Pulse DP: 2+ (normal)   Pulse PT: 2+ (normal)   Deformities: None  Right: Reflexes 2+   Vibratory sensation normal   Filament test normal sensation with micro filament   Pulse DP: 2+ (normal)   Pulse PT: 2+ (normal)   Deformities: None      Depression Risk Factor Screening:     PHQ over the last two weeks 7/19/2016   Little interest or pleasure in doing things Not at all   Feeling down, depressed or hopeless Not at all   Total Score PHQ 2 0     Alcohol Risk Factor Screening: You do not drink alcohol or very rarely. Functional Ability and Level of Safety:   Hearing Loss  Hearing is good. Activities of Daily Living  The home contains: no safety equipment. Patient does total self care    Fall Risk  No flowsheet data found.     Abuse Screen  Patient is not abused    Cognitive Screening   Evaluation of Cognitive Function:  Has your family/caregiver stated any concerns about your memory: no  Normal    Patient Care Team   Patient Care Team:  Rose Augustin MD as PCP - General (Family Practice)  Rose Augustin MD (Family Practice)  Jeana Burden MD (Inactive) (Cardiology)  Rehan Zafar MD as Physician (Urology)    Assessment/Plan   Education and counseling provided:  Are appropriate based on today's review and evaluation  Screening Pap and pelvic (covered once every 2 years)  Cardiovascular screening blood test    Diagnoses and all orders for this visit:    1. Medicare annual wellness visit, subsequent    2. Diabetic gastroparesis associated with type 2 diabetes mellitus (HCC)  -     AMB POC HEMOGLOBIN A1C  -     AMB POC GLUCOSE TEST  -     MICROALBUMIN, UR, RAND W/ MICROALB/CREAT RATIO; Future    3. Pure hypercholesterolemia  -     LIPID PANEL; Future  -     METABOLIC PANEL, COMPREHENSIVE; Future    4. Essential hypertension  -     METABOLIC PANEL, COMPREHENSIVE; Future    5. Microalbuminuria  -     REFERRAL TO NEPHROLOGY    6. Encounter for diabetic foot exam ([facility] [address]  -      DIABETES FOOT EXAM      Health Maintenance Due   Topic Date Due    Pneumococcal 19-64 Medium Risk (1 of 1 - PPSV23) 04/08/2002    FOOT EXAM Q1  06/19/2017    HEMOGLOBIN A1C Q6M  10/20/2017    EYE EXAM RETINAL OR DILATED Q1  11/04/2017    MICROALBUMIN Q1  01/12/2018    MEDICARE YEARLY EXAM  03/14/2018    LIPID PANEL Q1  04/20/2018   I have discussed the diagnosis with the patient and the intended plan as seen in the above orders. The patient has received an after-visit summary and questions were answered concerning future plans. I have discussed medication side effects and warnings with the patient as well. Patient agreeable with above plan and verbalizes understanding. Follow-up Disposition:  Return in about 3 months (around 8/4/2018) for HLD/HTN.

## 2018-05-04 NOTE — PATIENT INSTRUCTIONS
A Healthy Lifestyle: Care Instructions  Your Care Instructions    A healthy lifestyle can help you feel good, stay at a healthy weight, and have plenty of energy for both work and play. A healthy lifestyle is something you can share with your whole family. A healthy lifestyle also can lower your risk for serious health problems, such as high blood pressure, heart disease, and diabetes. You can follow a few steps listed below to improve your health and the health of your family. Follow-up care is a key part of your treatment and safety. Be sure to make and go to all appointments, and call your doctor if you are having problems. It's also a good idea to know your test results and keep a list of the medicines you take. How can you care for yourself at home? · Do not eat too much sugar, fat, or fast foods. You can still have dessert and treats now and then. The goal is moderation. · Start small to improve your eating habits. Pay attention to portion sizes, drink less juice and soda pop, and eat more fruits and vegetables. ¨ Eat a healthy amount of food. A 3-ounce serving of meat, for example, is about the size of a deck of cards. Fill the rest of your plate with vegetables and whole grains. ¨ Limit the amount of soda and sports drinks you have every day. Drink more water when you are thirsty. ¨ Eat at least 5 servings of fruits and vegetables every day. It may seem like a lot, but it is not hard to reach this goal. A serving or helping is 1 piece of fruit, 1 cup of vegetables, or 2 cups of leafy, raw vegetables. Have an apple or some carrot sticks as an afternoon snack instead of a candy bar. Try to have fruits and/or vegetables at every meal.  · Make exercise part of your daily routine. You may want to start with simple activities, such as walking, bicycling, or slow swimming. Try to be active 30 to 60 minutes every day. You do not need to do all 30 to 60 minutes all at once.  For example, you can exercise 3 times a day for 10 or 20 minutes. Moderate exercise is safe for most people, but it is always a good idea to talk to your doctor before starting an exercise program.  · Keep moving. Alex Em the lawn, work in the garden, or [employer]. Take the stairs instead of the elevator at work. · If you smoke, quit. People who smoke have an increased risk for heart attack, stroke, cancer, and other lung illnesses. Quitting is hard, but there are ways to boost your chance of quitting tobacco for good. ¨ Use nicotine gum, patches, or lozenges. ¨ Ask your doctor about stop-smoking programs and medicines. ¨ Keep trying. In addition to reducing your risk of diseases in the future, you will notice some benefits soon after you stop using tobacco. If you have shortness of breath or asthma symptoms, they will likely get better within a few weeks after you quit. · Limit how much alcohol you drink. Moderate amounts of alcohol (up to 2 drinks a day for men, 1 drink a day for women) are okay. But drinking too much can lead to liver problems, high blood pressure, and other health problems. Family health  If you have a family, there are many things you can do together to improve your health. · Eat meals together as a family as often as possible. · Eat healthy foods. This includes fruits, vegetables, lean meats and dairy, and whole grains. · Include your family in your fitness plan. Most people think of activities such as jogging or tennis as the way to fitness, but there are many ways you and your family can be more active. Anything that makes you breathe hard and gets your heart pumping is exercise. Here are some tips:  ¨ Walk to do errands or to take your child to school or the bus. ¨ Go for a family bike ride after dinner instead of watching TV. Where can you learn more? Go to http://aram-klaus.info/. Enter M627 in the search box to learn more about \"A Healthy Lifestyle: Care Instructions. \"  Current as of: May 12, 2017  Content Version: 11.4  © 8695-1020 Healthwise, [employer]. Care instructions adapted under license by [employer] (which disclaims liability or warranty for this information). If you have questions about a medical condition or this instruction, always ask your healthcare professional. [address] any warranty or liability for your use of this information. Medicare Wellness Visit, Female    The best way to live healthy is to have a healthy lifestyle by eating a well-balanced diet, exercising regularly, limiting alcohol and stopping smoking. Regular physical exams and screening tests are another way to keep healthy. Preventive exams provided by your health care provider can find health problems before they become diseases or illnesses. Preventive services including immunizations, screening tests, monitoring and exams can help you take care of your own health. All people over age 72 should have a pneumovax  and and a prevnar shot to prevent pneumonia. These are once in a lifetime unless you and your provider decide differently. All people over 65 should have a yearly flu shot and a tetanus vaccine every 10 years. A bone mass density to screen for osteoporosis or thinning of the bones should be done every 2 years after 65. Screening for diabetes mellitus with a blood sugar test should be done every year. Glaucoma is a disease of the eye due to increased ocular pressure that can lead to blindness and it should be done every year by an eye professional.    Cardiovascular screening tests that check for elevated lipids (fatty part of blood) which can lead to heart disease and strokes should be done every 5 years. Colorectal screening that evaluates for blood or polyps in your colon should be done yearly as a stool test or every five years as a flexible sigmoidoscope or every 10 years as a colonoscopy up to age 76.     Breast cancer screening with a mammogram is recommended biennially  for women age 54-69. Screening for cervical cancer with a pap smear and pelvic exam is recommended for women after age 72 years every 2 years up to age 79 or when the provider and patient decide to stop. If there is a history of cervical abnormalities or other increased risk for cancer then the test is recommended yearly. Hepatitis C screening is also recommended for anyone born between 80 through [address]. A shingles vaccine is also recommended once in a lifetime after age 61. Your Medicare Wellness Exam is recommended annually. Here is a list of your current Health Maintenance items with a due date:  Health Maintenance Due   Topic Date Due    Pneumococcal Vaccine (1 of 1 - PPSV23) 04/08/2002    Diabetic Foot Care  06/19/2017    Hemoglobin A1C    10/20/2017    Eye Exam  11/04/2017    Albumin Urine Test  01/12/2018    Annual Well Visit  03/14/2018    Cholesterol Test   04/20/2018         Medicare Wellness Visit, Female    The best way to live healthy is to have a healthy lifestyle by eating a well-balanced diet, exercising regularly, limiting alcohol and stopping smoking. Regular physical exams and screening tests are another way to keep healthy. Preventive exams provided by your health care provider can find health problems before they become diseases or illnesses. Preventive services including immunizations, screening tests, monitoring and exams can help you take care of your own health. All people over age 72 should have a pneumovax  and and a prevnar shot to prevent pneumonia. These are once in a lifetime unless you and your provider decide differently. All people over 65 should have a yearly flu shot and a tetanus vaccine every 10 years. A bone mass density to screen for osteoporosis or thinning of the bones should be done every 2 years after 65.     Screening for diabetes mellitus with a blood sugar test should be done every year.    Glaucoma is a disease of the eye due to increased ocular pressure that can lead to blindness and it should be done every year by an eye professional.    Cardiovascular screening tests that check for elevated lipids (fatty part of blood) which can lead to heart disease and strokes should be done every 5 years. Colorectal screening that evaluates for blood or polyps in your colon should be done yearly as a stool test or every five years as a flexible sigmoidoscope or every 10 years as a colonoscopy up to age 76. Breast cancer screening with a mammogram is recommended biennially  for women age 54-69. Screening for cervical cancer with a pap smear and pelvic exam is recommended for women after age 72 years every 2 years up to age 79 or when the provider and patient decide to stop. If there is a history of cervical abnormalities or other increased risk for cancer then the test is recommended yearly. Hepatitis C screening is also recommended for anyone born between 80 through [address]. A shingles vaccine is also recommended once in a lifetime after age 61. Your Medicare Wellness Exam is recommended annually.     Here is a list of your current Health Maintenance items with a due date:  Health Maintenance Due   Topic Date Due    Pneumococcal Vaccine (1 of 1 - PPSV23) 04/08/2002    Diabetic Foot Care  06/19/2017    Hemoglobin A1C    10/20/2017    Eye Exam  11/04/2017    Albumin Urine Test  01/12/2018    Annual Well Visit  03/14/2018    Cholesterol Test   04/20/2018

## 2018-05-07 ENCOUNTER — HOSPITAL ENCOUNTER (OUTPATIENT)
Dept: LAB | Age: 33
Discharge: HOME | End: 2018-05-07

## 2018-05-07 PROCEDURE — 99001 SPECIMEN HANDLING PT-LAB: CPT | Performed by: NURSE PRACTITIONER

## 2018-05-08 LAB
ALBUMIN/CREAT UR: 2233.7 MG/G CREAT (ref 0–30)
CREAT UR-MCNC: 90.7 MG/DL
MICROALBUMIN UR-MCNC: 2026 UG/ML

## 2018-07-18 ENCOUNTER — TELEPHONE (OUTPATIENT)
Dept: FAMILY MEDICINE CLINIC | Age: 33
End: 2018-07-18

## 2018-07-20 DIAGNOSIS — I10 ESSENTIAL HYPERTENSION WITH GOAL BLOOD PRESSURE LESS THAN 130/80: ICD-10-CM

## 2018-07-20 RX ORDER — LOSARTAN POTASSIUM 100 MG/1
100 TABLET ORAL DAILY
Qty: 90 TAB | Refills: 0 | Status: SHIPPED | OUTPATIENT
Start: 2018-07-20 | End: 2019-01-22

## 2018-07-20 NOTE — TELEPHONE ENCOUNTER
Requested Prescriptions     Pending Prescriptions Disp Refills    losartan (COZAAR) 100 mg tablet 90 Tab 0     Sig: Take 1 Tab by mouth daily.  APPOINTMENT NEEDED PRIOR TO NEXT PATIENT     Last OV 5/04/18  Next OV 8/04/18

## 2018-07-20 NOTE — TELEPHONE ENCOUNTER
I called Pt in regards medication recall. Informed Pt that we recved her message in regards to the Medication. Informed Pt that we will speak with the pharmacy in regards to medication. Called Pharmacy per pharmacist medication hasn't been recalled however the Pt doesn't have any more refills. Refill request sent to pharmacy.

## 2018-07-26 NOTE — TELEPHONE ENCOUNTER
I called Pt in regards to Medication refill. Pt sts she doesn't need it refilled she would like to have another medication sent in because it is being recalled.      Nerissa Please advise

## 2018-07-26 NOTE — TELEPHONE ENCOUNTER
I called Pt back in regards to Medication concerns. Informed Pt that Per Mariaelena Augustin it is Valsartan that has been recalled not the losartan. Pt said ok.

## 2018-08-14 ENCOUNTER — OFFICE VISIT (OUTPATIENT)
Dept: FAMILY MEDICINE CLINIC | Age: 33
End: 2018-08-14

## 2018-08-14 VITALS
SYSTOLIC BLOOD PRESSURE: 169 MMHG | OXYGEN SATURATION: 100 % | TEMPERATURE: 98.6 F | DIASTOLIC BLOOD PRESSURE: 88 MMHG | BODY MASS INDEX: 32.94 KG/M2 | HEIGHT: 62 IN | WEIGHT: 179 LBS | HEART RATE: 91 BPM | RESPIRATION RATE: 20 BRPM

## 2018-08-14 DIAGNOSIS — I10 ESSENTIAL HYPERTENSION: Primary | ICD-10-CM

## 2018-08-14 DIAGNOSIS — E11.21 TYPE 2 DIABETES WITH NEPHROPATHY (HCC): ICD-10-CM

## 2018-08-14 DIAGNOSIS — E78.00 PURE HYPERCHOLESTEROLEMIA: ICD-10-CM

## 2018-08-14 NOTE — MR AVS SNAPSHOT
[address] [facility] 
 
 
 [address] [place] [address] [address] [address] 
[phone] Patient: Sari Rojas MRN: [id number] :1985 Visit Information Date & Time Provider Department Dept. Phone Encounter #  
 2018 11:15 AM KEHINDE Banerjee [address] [facility] [id number] Follow-up Instructions Return in about 4 weeks (around 2018) for since resuming losartan . Your Appointments 2018  1:00 PM  
Office Visit with Yasmany Rooney MD  
[facility] [address] [facility]) Appt Note: check up [address] [address]  
[phone] [address] [facility] [address] Upcoming Health Maintenance Date Due Pneumococcal 19-64 Medium Risk (1 of 1 - PPSV23) 2002 EYE EXAM RETINAL OR DILATED Q1 2017 LIPID PANEL Q1 2018 Influenza Age 5 to Adult 2018 HEMOGLOBIN A1C Q6M 2018 MEDICARE YEARLY EXAM 2019 MICROALBUMIN Q1 2019 FOOT EXAM Q1 2019 PAP AKA CERVICAL CYTOLOGY 2020 DTaP/Tdap/Td series (2 - Td) 10/15/2025 Allergies as of 2018  Review Complete On: 2018 By: Yovanny Naik Severity Noted Reaction Type Reactions Latex  2010    Hives Contrast Agent [Iodine]  2017    Other (comments) \"Burning with shooting pain\" Macrobid [Nitrofurantoin Monohyd/m-cryst]  2011   Intolerance Diarrhea Novolog Mix 70-30 [Insulin Asp Prt-insulin Aspart]  2013   Intolerance Nausea and Vomiting Current Immunizations  Reviewed on 10/15/2015 Name Date Influenza Vaccine (Quad) PF 10/20/2017, 10/3/2016, 10/15/2015, 2014 Influenza Vaccine PF 2012  1:10 PM  
 Tdap 10/15/2015 Not reviewed this visit You Were Diagnosed With   
  
 Codes Comments Essential hypertension    -  Primary ICD-10-CM: I10 
ICD-9-CM: 401.9 Pure hypercholesterolemia     ICD-10-CM: E78.00 ICD-9-CM: 272.0 Vitals BP Pulse Temp Resp Height(growth percentile) Weight(growth percentile) 169/88 (BP 1 Location: Left arm, BP Patient Position: Sitting) 91 98.6 °F (37 °C) (Oral) 20 5' 2\" (1.575 m) 179 lb (81.2 kg) LMP SpO2 BMI OB Status Smoking Status 07/26/2018 100% 32.74 kg/m2 Having regular periods Never Smoker BMI and BSA Data Body Mass Index Body Surface Area 32.74 kg/m 2 1.88 m 2 Preferred Pharmacy Pharmacy Name Phone [facility] [address] [address] [phone] Your Updated Medication List  
  
   
This list is accurate as of 8/14/18 12:39 PM.  Always use your most recent med list.  
  
  
  
  
 Blood-Glucose Meter monitoring kit  
by Does Not Apply route two (2) times a day. butalbital-acetaminophen-caffeine -40 mg per tablet Commonly known as:  [place] Fresh Take 1 Tab by mouth every six (6) hours as needed for Pain. furosemide 20 mg tablet Commonly known as:  LASIX Take as needed daily for leg swelling. glucose blood VI test strips strip Commonly known as:  ONETOUCH ULTRA TEST  
by Does Not Apply route. One touch ultra mini test strips HumaLOG U-100 Insulin 100 unit/mL injection Generic drug:  insulin lispro  
by SubCUTAneous route. 20-40 units with meals  
  
 losartan 100 mg tablet Commonly known as:  COZAAR Take 1 Tab by mouth daily. pregabalin 75 mg capsule Commonly known as:  Bela Gates Take 1 Cap by mouth three (3) times daily. * propranolol LA 80 mg SR capsule Commonly known as:  INDERAL LA Take 1 Cap by mouth daily. Each evening * propranolol  mg SR capsule Commonly known as:  INDERAL LA Take 1 Cap by mouth daily. Every morning  
  
 rivaroxaban 15 mg Tab tablet Commonly known as:  Henderson Hoit Take 1 Tab by mouth daily. * simvastatin 40 mg tablet Commonly known as:  ZOCOR Take 1 Tab by mouth nightly. APPOINTMENT IS REQUIRED BEFORE NEXT REFILL  
  
 * simvastatin 40 mg tablet Commonly known as:  ZOCOR  
TAKE 1 TABLET BY MOUTH NIGHTLY  
  
 SYNTHROID PO Take  by mouth. * Notice: This list has 4 medication(s) that are the same as other medications prescribed for you. Read the directions carefully, and ask your doctor or other care provider to review them with you. Follow-up Instructions Return in about 4 weeks (around 9/11/2018) for since resuming losartan . Patient Instructions Diabetic Nephropathy: Care Instructions Your Care Instructions Finding out that your kidneys have been damaged can be very distressing. It may have taken you by surprise, since damage to kidneys usually does not cause symptoms early on. It is normal to feel upset and afraid. Having diabetic nephropathy means that for some time you have had high blood sugar, which damages the kidneys. Healthy kidneys keep protein in your blood, where it belongs. Damaged kidneys do not work the way they should. Your kidneys are letting protein pass into your urine. Sometimes diabetic kidney disease can lead to kidney failure. Your doctor will tell you how you might be able to slow damage to your kidneys. In many cases, prompt and regular treatment can prevent kidney failure. You will need to take medicine and may need to make a number of changes in your normal routines. If you can keep your blood sugar and blood pressure under control and take certain medicines, you may reduce your chance of kidney failure. Follow-up care is a key part of your treatment and safety. Be sure to make and go to all appointments, and call your doctor if you are having problems. It's also a good idea to know your test results and keep a list of the medicines you take. How can you care for yourself at home? · Take your medicines exactly as prescribed. It is very important that you take your insulin or other diabetes medicine as your doctor tells you. Call your doctor if you think you are having a problem with your medicine. · Try to keep blood sugar in your target range. ¨ Eat a variety of foods, with carbohydrate spread out in your meals. Your doctor may restrict your protein. A dietitian can help you plan meals. ¨ If your doctor recommends it, get more exercise. Walking is a good choice. Bit by bit, increase the amount you walk every day. Try for at least 30 minutes on most days of the week. ¨ Check your blood sugar as often as your doctor recommends. · Take and record your blood pressure at home if your doctor tells you to. Learn the importance of the two measures of blood pressure (such as 130 over 80, or 130/80). To take your blood pressure at home: ¨ Ask your doctor to check your blood pressure monitor. He or she can make sure that it is accurate and that the cuff fits you. Also ask your doctor to watch you to make sure that you are using it right. ¨ Do not eat, use tobacco products, or use medicine known to raise blood pressure (such as some nasal decongestant sprays) before taking your blood pressure. ¨ Avoid taking your blood pressure if you have just exercised or are nervous or upset. Rest at least 15 minutes before taking a reading. · Eat a low-salt diet to help keep your blood pressure in your target range. · Do not smoke. Smoking raises your risk of many health problems, including diabetic nephropathy. If you need help quitting, talk to your doctor about stop-smoking programs and medicines. These can increase your chances of quitting for good. · Do not take ibuprofen, naproxen, or similar medicines, unless your doctor tells you to. These medicines may make kidney problems worse. When should you call for help? Call 911 anytime you think you may need emergency care. For example, call if:   · You passed out (lost consciousness).  
 Call your doctor now or seek immediate medical care if: 
  · You have new or worse nausea and vomiting.  
  · You have much less urine than normal, or you have no urine.  
  · You are feeling confused or cannot think clearly.  
  · You have new or more blood in your urine.  
  · You have new swelling.  
  · You are dizzy or lightheaded, or feel like you may faint.  
 Watch closely for changes in your health, and be sure to contact your doctor if: 
  · You do not get better as expected. Where can you learn more? Go to http://aram-klaus.info/. Enter P361 in the search box to learn more about \"Diabetic Nephropathy: Care Instructions. \" Current as of: May 12, 2017 Content Version: 11.7 © 1783-1716 [employer]. Care instructions adapted under license by [employer] (which disclaims liability or warranty for this information). If you have questions about a medical condition or this instruction, always ask your healthcare professional. [place] [address] any warranty or liability for your use of this information. Introducing [facility] [place] [facility]! Dear Abilio Batitsaence: 
Thank you for requesting a Lovli account. Our records indicate that you already have an active Lovli account. You can access your account anytime at [email or web address] Did you know that you can access your hospital and ER discharge instructions at any time in Lovli? You can also review all of your test results from your hospital stay or ER visit. Additional Information If you have questions, please visit the Frequently Asked Questions section of the Lovli website at [email or web address] Remember, Lovli is NOT to be used for urgent needs. For medical emergencies, dial 911. Now available from your iPhone and Android! Please provide this summary of care documentation to your next provider. Your primary care clinician is listed as [address]. If you have any questions after today's visit, please call [phone].

## 2018-08-14 NOTE — PATIENT INSTRUCTIONS
Diabetic Nephropathy: Care Instructions  Your Care Instructions    Finding out that your kidneys have been damaged can be very distressing. It may have taken you by surprise, since damage to kidneys usually does not cause symptoms early on. It is normal to feel upset and afraid. Having diabetic nephropathy means that for some time you have had high blood sugar, which damages the kidneys. Healthy kidneys keep protein in your blood, where it belongs. Damaged kidneys do not work the way they should. Your kidneys are letting protein pass into your urine. Sometimes diabetic kidney disease can lead to kidney failure. Your doctor will tell you how you might be able to slow damage to your kidneys. In many cases, prompt and regular treatment can prevent kidney failure. You will need to take medicine and may need to make a number of changes in your normal routines. If you can keep your blood sugar and blood pressure under control and take certain medicines, you may reduce your chance of kidney failure. Follow-up care is a key part of your treatment and safety. Be sure to make and go to all appointments, and call your doctor if you are having problems. It's also a good idea to know your test results and keep a list of the medicines you take. How can you care for yourself at home? · Take your medicines exactly as prescribed. It is very important that you take your insulin or other diabetes medicine as your doctor tells you. Call your doctor if you think you are having a problem with your medicine. · Try to keep blood sugar in your target range. ¨ Eat a variety of foods, with carbohydrate spread out in your meals. Your doctor may restrict your protein. A dietitian can help you plan meals. ¨ If your doctor recommends it, get more exercise. Walking is a good choice. Bit by bit, increase the amount you walk every day. Try for at least 30 minutes on most days of the week.   ¨ Check your blood sugar as often as your doctor recommends. · Take and record your blood pressure at home if your doctor tells you to. Learn the importance of the two measures of blood pressure (such as 130 over 80, or 130/80). To take your blood pressure at home:  ¨ Ask your doctor to check your blood pressure monitor. He or she can make sure that it is accurate and that the cuff fits you. Also ask your doctor to watch you to make sure that you are using it right. ¨ Do not eat, use tobacco products, or use medicine known to raise blood pressure (such as some nasal decongestant sprays) before taking your blood pressure. ¨ Avoid taking your blood pressure if you have just exercised or are nervous or upset. Rest at least 15 minutes before taking a reading. · Eat a low-salt diet to help keep your blood pressure in your target range. · Do not smoke. Smoking raises your risk of many health problems, including diabetic nephropathy. If you need help quitting, talk to your doctor about stop-smoking programs and medicines. These can increase your chances of quitting for good. · Do not take ibuprofen, naproxen, or similar medicines, unless your doctor tells you to. These medicines may make kidney problems worse. When should you call for help? Call 911 anytime you think you may need emergency care. For example, call if:    · You passed out (lost consciousness).    Call your doctor now or seek immediate medical care if:    · You have new or worse nausea and vomiting.     · You have much less urine than normal, or you have no urine.     · You are feeling confused or cannot think clearly.     · You have new or more blood in your urine.     · You have new swelling.     · You are dizzy or lightheaded, or feel like you may faint.    Watch closely for changes in your health, and be sure to contact your doctor if:    · You do not get better as expected. Where can you learn more? Go to http://aram-klaus.info/.   Enter P361 in the search box to learn more about \"Diabetic Nephropathy: Care Instructions. \"  Current as of: May 12, 2017  Content Version: 11.7  © 0287-6005 nVoq, [employer]. Care instructions adapted under license by [employer] (which disclaims liability or warranty for this information). If you have questions about a medical condition or this instruction, always ask your healthcare professional. [address] any warranty or liability for your use of this information.

## 2018-08-14 NOTE — PROGRESS NOTES
Chief Complaint   Patient presents with    Hypertension    Cholesterol Problem     1. Have you been to the ER, urgent care clinic since your last visit? Hospitalized since your last visit? No    2. Have you seen or consulted any other health care providers outside of the [address] [place] [address] since your last visit? Include any pap smears or colon screening.  No

## 2018-08-14 NOTE — PROGRESS NOTES
Subjective:   Hal Licona is a 26 y.o. female with hypertension presents for 3 months follow up. Patient Active Problem List   Diagnosis Code    Neuropathy G62.9    Diabetes mellitus ([facility] [address] E11.9    Eye problems H57.9    Toe pain M79.676    Hyperlipidemia E78.5    Diabetic gastroparesis associated with type 2 diabetes mellitus ([facility] [address] E11.43, K31.84    Hypovitaminosis D E55.9    Paroxysmal atrial fibrillation (HCC) I48.0    Chronic anticoagulation Z79.01    Transient cerebral ischemia G45.9    Renal insufficiency N28.9    Bad odor of urine R82.90    Type 2 diabetes with nephropathy (HCC) E11.21     Current Outpatient Prescriptions   Medication Sig Dispense Refill    losartan (COZAAR) 100 mg tablet Take 1 Tab by mouth daily. 90 Tab 0    simvastatin (ZOCOR) 40 mg tablet TAKE 1 TABLET BY MOUTH NIGHTLY 30 Tab 6    butalbital-acetaminophen-caffeine (FIORICET, ESGIC) -40 mg per tablet Take 1 Tab by mouth every six (6) hours as needed for Pain. 30 Tab 0    simvastatin (ZOCOR) 40 mg tablet Take 1 Tab by mouth nightly. APPOINTMENT IS REQUIRED BEFORE NEXT REFILL 15 Tab 0    rivaroxaban (XARELTO) 15 mg tab tablet Take 1 Tab by mouth daily. 30 Tab 11    propranolol LA (INDERAL LA) 80 mg SR capsule Take 1 Cap by mouth daily. Each evening 90 Cap 3    propranolol LA (INDERAL LA) 120 mg SR capsule Take 1 Cap by mouth daily. Every morning 90 Cap 3    furosemide (LASIX) 20 mg tablet Take as needed daily for leg swelling. 30 Tab 3    LEVOTHYROXINE SODIUM (SYNTHROID PO) Take  by mouth.  insulin lispro (HUMALOG) 100 unit/mL injection by SubCUTAneous route. 20-40 units with meals      pregabalin (LYRICA) 75 mg capsule Take 1 Cap by mouth three (3) times daily. 90 Cap 3    Blood-Glucose Meter monitoring kit by Does Not Apply route two (2) times a day. 1 Kit 0    glucose blood VI test strips (ONE TOUCH ULTRA TEST) strip by Does Not Apply route.  One touch ultra mini test strips 1 Package 11 Allergies   Allergen Reactions    Latex Hives    Contrast Agent [Iodine] Other (comments)     \"Burning with shooting pain\"   Adam Grams [Nitrofurantoin Monohyd/M-Cryst] Diarrhea    Novolog Mix 70-30 [Insulin Asp Prt-Insulin Aspart] Nausea and Vomiting     Past Surgical History:   Procedure Laterality Date    HX ORTHOPAEDIC  January 2013    right toe nail surgery Dr. Chiang Favor     Family History   Problem Relation Age of Onset    Diabetes Mother     Cancer Paternal Grandmother       Lab Results   Component Value Date/Time    Cholesterol, total 288 (H) 02/09/2017 10:00 AM    Cholesterol (POC) 267 01/25/2013 12:05 PM    HDL Cholesterol 55 02/09/2017 10:00 AM    HDL Cholesterol (POC) 37 01/25/2013 12:05 PM    LDL Cholesterol (POC) 185 01/25/2013 12:05 PM    LDL, calculated 189.6 (H) 02/09/2017 10:00 AM    VLDL, calculated 43.4 02/09/2017 10:00 AM    Triglyceride 217 (H) 02/09/2017 10:00 AM    Triglycerides (POC) 225 01/25/2013 12:05 PM    CHOL/HDL Ratio 5.2 (H) 02/09/2017 10:00 AM     Lab Results   Component Value Date/Time    Sodium 141 02/09/2017 10:00 AM    Potassium 4.2 02/09/2017 10:00 AM    Chloride 106 02/09/2017 10:00 AM    CO2 25 02/09/2017 10:00 AM    Anion gap 10 02/09/2017 10:00 AM    Glucose 322 (H) 02/09/2017 10:00 AM    BUN 24 (H) 02/09/2017 10:00 AM    Creatinine 1.45 (H) 02/09/2017 10:00 AM    BUN/Creatinine ratio 17 02/09/2017 10:00 AM    GFR est AA 50 (L) 02/09/2017 10:00 AM    GFR est non-AA 42 (L) 02/09/2017 10:00 AM    Calcium 8.4 (L) 02/09/2017 10:00 AM    Bilirubin, total 0.2 09/18/2016 08:27 PM    AST (SGOT) 16 02/09/2017 10:00 AM    Alk.  phosphatase 56 09/18/2016 08:27 PM    Protein, total 7.0 09/18/2016 08:27 PM    Albumin 2.4 (L) 09/18/2016 08:27 PM    A-G Ratio 1.2 09/24/2013 11:32 AM    ALT (SGPT) 16 02/09/2017 10:00 AM     Lab Results   Component Value Date/Time    WBC 6.6 09/20/2016 04:00 AM    HGB 9.8 (L) 09/20/2016 04:00 AM    HCT 31.0 (L) 09/20/2016 04:00 AM    PLATELET 223 09/20/2016 04:00 AM    MCV 89.6 09/20/2016 04:00 AM     Wt Readings from Last 3 Encounters:   08/14/18 179 lb (81.2 kg)   05/04/18 171 lb 3.2 oz (77.7 kg)   04/27/18 171 lb (77.6 kg)     Last Point of Care HGB A1C  Hemoglobin A1c (POC)   Date Value Ref Range Status   05/04/2018 8.5 % Final      BP Readings from Last 3 Encounters:   08/14/18 169/88   05/04/18 141/75   04/27/18 110/76       Hypertension ROS: not taking medications as instructed, no medication side effects noted, no TIA's, no chest pain on exertion, no dyspnea on exertion, no swelling of ankles. Review of Systems - General ROS: negative  Respiratory ROS: no cough, shortness of breath, or wheezing  Cardiovascular ROS: no chest pain or dyspnea on exertion    New concerns: patient states she has increased personal stressors. Reports she has not taken her medication in the last 2 weeks due to recent recall on valsartan. Reassured patient losartan has been deemed safe to take. Agreed to resume taking medication again. Objective:   Visit Vitals    /88 (BP 1 Location: Left arm, BP Patient Position: Sitting)    Pulse 91    Temp 98.6 °F (37 °C) (Oral)    Resp 20    Ht 5' 2\" (1.575 m)    Wt 179 lb (81.2 kg)    LMP 07/26/2018    SpO2 100%    BMI 32.74 kg/m2      General appearance - alert, well appearing, and in no distress  Neck - supple, no significant adenopathy, carotids upstroke normal bilaterally, no bruits  Chest - clear to auscultation, no wheezes, rales or rhonchi, symmetric air entry  Heart - normal rate, regular rhythm, normal S1, S2, no murmurs, rubs, clicks or gallops  Extremities - peripheral pulses normal, no pedal edema, no clubbing or cyanosis      Assessment/Plan:      ICD-10-CM ICD-9-CM    1. Essential hypertension I10 401.9    2. Pure hypercholesterolemia E78.00 272.0    3.  Type 2 diabetes with nephropathy (HCC) E11.21 250.40 REFERRAL TO NEPHROLOGY     583.81    needs improvement   reviewed diet, exercise and weight control  recommended sodium restriction. I have discussed the diagnosis with the patient and the intended plan as seen in the above orders. The patient has received an after-visit summary and questions were answered concerning future plans. I have discussed medication side effects and warnings with the patient as well. Patient agreeable with above plan and verbalizes understanding. Follow-up Disposition:  Return in about 4 weeks (around 9/11/2018) for since resuming losartan .

## 2018-09-11 ENCOUNTER — OFFICE VISIT (OUTPATIENT)
Dept: FAMILY MEDICINE CLINIC | Age: 33
End: 2018-09-11

## 2018-09-11 VITALS
OXYGEN SATURATION: 98 % | DIASTOLIC BLOOD PRESSURE: 64 MMHG | TEMPERATURE: 98.4 F | WEIGHT: 171 LBS | HEIGHT: 62 IN | HEART RATE: 91 BPM | RESPIRATION RATE: 20 BRPM | SYSTOLIC BLOOD PRESSURE: 105 MMHG | BODY MASS INDEX: 31.47 KG/M2

## 2018-09-11 DIAGNOSIS — Z23 ENCOUNTER FOR IMMUNIZATION: ICD-10-CM

## 2018-09-11 DIAGNOSIS — E66.9 OBESITY, CLASS I, BMI 30-34.9: ICD-10-CM

## 2018-09-11 DIAGNOSIS — I48.0 PAROXYSMAL ATRIAL FIBRILLATION (HCC): ICD-10-CM

## 2018-09-11 DIAGNOSIS — I10 ESSENTIAL HYPERTENSION: Primary | ICD-10-CM

## 2018-09-11 RX ORDER — PROPRANOLOL HYDROCHLORIDE 120 MG/1
120 CAPSULE, EXTENDED RELEASE ORAL DAILY
Qty: 90 CAP | Refills: 3 | Status: SHIPPED | OUTPATIENT
Start: 2018-09-11 | End: 2018-09-20 | Stop reason: SDUPTHER

## 2018-09-11 NOTE — MR AVS SNAPSHOT
[address] [facility] 
 
 
 [address], [place] [address] [address] [address] 
[phone] Patient: Manjinder Castro MRN: [id number] :[id number] Visit Information Date & Time Provider Department Dept. Phone Encounter #  
 2018 11:00 AM [facility] [phone] [id number] Follow-up Instructions Return in about 3 months (around 2018) for HTN. Your Appointments 2018  1:00 PM  
Office Visit with Safia Cardenas MD  
[facility] [address] [facility]) Appt Note: check up [address] [place] [address] [address]  
[phone] [address] [place] [id number] Upcoming Health Maintenance Date Due Pneumococcal 19-64 Medium Risk (1 of 1 - PPSV23) 2002 EYE EXAM RETINAL OR DILATED Q1 2017 LIPID PANEL Q1 2018 HEMOGLOBIN A1C Q6M 2018 MEDICARE YEARLY EXAM 2019 MICROALBUMIN Q1 2019 FOOT EXAM Q1 2019 PAP AKA CERVICAL CYTOLOGY 2020 DTaP/Tdap/Td series (2 - Td) 10/15/2025 Allergies as of 2018  Review Complete On: 2018 By: Laura Guerra Severity Noted Reaction Type Reactions Latex  2010    Hives Contrast Agent [Iodine]  2017    Other (comments) \"Burning with shooting pain\" Macrobid [Nitrofurantoin Monohyd/m-cryst]  2011   Intolerance Diarrhea Novolog Mix 70-30 [Insulin Asp Prt-insulin Aspart]  2013   Intolerance Nausea and Vomiting Current Immunizations  Reviewed on 10/15/2015 Name Date Influenza Vaccine (Quad) PF 2018, 10/20/2017, 10/3/2016, 10/15/2015, 2014 Influenza Vaccine PF 2012  1:10 PM  
 Tdap 10/15/2015 Not reviewed this visit You Were Diagnosed With   
  
 Codes Comments Essential hypertension    -  Primary ICD-10-CM: I10 
ICD-9-CM: 401.9  Encounter for immunization     ICD-10-CM: Y63 
 ICD-9-CM: V03.89 Paroxysmal atrial fibrillation (HCC)     ICD-10-CM: I48.0 ICD-9-CM: 427.31 Vitals BP Pulse Temp Resp Height(growth percentile) Weight(growth percentile) 105/64 (BP 1 Location: Left arm, BP Patient Position: Sitting) 91 98.4 °F (36.9 °C) (Oral) 20 5' 2\" (1.575 m) 171 lb (77.6 kg) LMP SpO2 BMI OB Status Smoking Status 08/16/2018 98% 31.28 kg/m2 Having regular periods Never Smoker Vitals History BMI and BSA Data Body Mass Index Body Surface Area  
 31.28 kg/m 2 1.84 m 2 Preferred Pharmacy Pharmacy Name Phone [facility] [phone] [place] [address] [phone] Your Updated Medication List  
  
   
This list is accurate as of 9/11/18 12:08 PM.  Always use your most recent med list.  
  
  
  
  
 Blood-Glucose Meter monitoring kit  
by Does Not Apply route two (2) times a day. butalbital-acetaminophen-caffeine -40 mg per tablet Commonly known as:  Darnella Nim Take 1 Tab by mouth every six (6) hours as needed for Pain. furosemide 20 mg tablet Commonly known as:  LASIX Take as needed daily for leg swelling. glucose blood VI test strips strip Commonly known as:  ONETOUCH ULTRA TEST  
by Does Not Apply route. One touch ultra mini test strips HumaLOG U-100 Insulin 100 unit/mL injection Generic drug:  insulin lispro  
by SubCUTAneous route. 20-40 units with meals  
  
 losartan 100 mg tablet Commonly known as:  COZAAR Take 1 Tab by mouth daily. pregabalin 75 mg capsule Commonly known as:  Griselda Harada Take 1 Cap by mouth three (3) times daily. * propranolol LA 80 mg SR capsule Commonly known as:  INDERAL LA Take 1 Cap by mouth daily. Each evening * propranolol  mg SR capsule Commonly known as:  INDERAL LA Take 1 Cap by mouth daily. Every morning  
  
 rivaroxaban 15 mg Tab tablet Commonly known as:  Tariq Honor Take 1 Tab by mouth daily. * simvastatin 40 mg tablet Commonly known as:  ZOCOR Take 1 Tab by mouth nightly. APPOINTMENT IS REQUIRED BEFORE NEXT REFILL  
  
 * simvastatin 40 mg tablet Commonly known as:  ZOCOR  
TAKE 1 TABLET BY MOUTH NIGHTLY  
  
 SYNTHROID PO Take  by mouth. * Notice: This list has 4 medication(s) that are the same as other medications prescribed for you. Read the directions carefully, and ask your doctor or other care provider to review them with you. Prescriptions Sent to Pharmacy Refills  
 propranolol LA (INDERAL LA) 120 mg SR capsule 3 Sig: Take 1 Cap by mouth daily. Every morning Class: Normal  
 Pharmacy: [facility] [address] [place] [address] Jose Mendiola [address] Ph #: [phone] Route: Oral  
  
We Performed the Following INFLUENZA VIRUS VAC QUAD,SPLIT,PRESV FREE SYRINGE IM [id number] CPT(R)] Follow-up Instructions Return in about 3 months (around 12/11/2018) for HTN. Patient Instructions Diabetic Nephropathy: Care Instructions Your Care Instructions Finding out that your kidneys have been damaged can be very distressing. It may have taken you by surprise, since damage to kidneys usually does not cause symptoms early on. It is normal to feel upset and afraid. Having diabetic nephropathy means that for some time you have had high blood sugar, which damages the kidneys. Healthy kidneys keep protein in your blood, where it belongs. Damaged kidneys do not work the way they should. Your kidneys are letting protein pass into your urine. Sometimes diabetic kidney disease can lead to kidney failure. Your doctor will tell you how you might be able to slow damage to your kidneys. In many cases, prompt and regular treatment can prevent kidney failure.  You will need to take medicine and may need to make a number of changes in your normal routines. If you can keep your blood sugar and blood pressure under control and take certain medicines, you may reduce your chance of kidney failure. Follow-up care is a key part of your treatment and safety. Be sure to make and go to all appointments, and call your doctor if you are having problems. It's also a good idea to know your test results and keep a list of the medicines you take. How can you care for yourself at home? · Take your medicines exactly as prescribed. It is very important that you take your insulin or other diabetes medicine as your doctor tells you. Call your doctor if you think you are having a problem with your medicine. · Try to keep blood sugar in your target range. ¨ Eat a variety of foods, with carbohydrate spread out in your meals. Your doctor may restrict your protein. A dietitian can help you plan meals. ¨ If your doctor recommends it, get more exercise. Walking is a good choice. Bit by bit, increase the amount you walk every day. Try for at least 30 minutes on most days of the week. ¨ Check your blood sugar as often as your doctor recommends. · Take and record your blood pressure at home if your doctor tells you to. Learn the importance of the two measures of blood pressure (such as 130 over 80, or 130/80). To take your blood pressure at home: ¨ Ask your doctor to check your blood pressure monitor. He or she can make sure that it is accurate and that the cuff fits you. Also ask your doctor to watch you to make sure that you are using it right. ¨ Do not eat, use tobacco products, or use medicine known to raise blood pressure (such as some nasal decongestant sprays) before taking your blood pressure. ¨ Avoid taking your blood pressure if you have just exercised or are nervous or upset. Rest at least 15 minutes before taking a reading. · Eat a low-salt diet to help keep your blood pressure in your target range. · Do not smoke. Smoking raises your risk of many health problems, including diabetic nephropathy. If you need help quitting, talk to your doctor about stop-smoking programs and medicines. These can increase your chances of quitting for good. · Do not take ibuprofen, naproxen, or similar medicines, unless your doctor tells you to. These medicines may make kidney problems worse. When should you call for help? Call 911 anytime you think you may need emergency care. For example, call if: 
  · You passed out (lost consciousness).  
 Call your doctor now or seek immediate medical care if: 
  · You have new or worse nausea and vomiting.  
  · You have much less urine than normal, or you have no urine.  
  · You are feeling confused or cannot think clearly.  
  · You have new or more blood in your urine.  
  · You have new swelling.  
  · You are dizzy or lightheaded, or feel like you may faint.  
 Watch closely for changes in your health, and be sure to contact your doctor if: 
  · You do not get better as expected. Where can you learn more? Go to http://aram-klaus.info/. Enter P361 in the search box to learn more about \"Diabetic Nephropathy: Care Instructions. \" Current as of: May 12, 2017 Content Version: 11.7 © 9970-6972 [employer], [employer]. Care instructions adapted under license by [employer] (which disclaims liability or warranty for this information). If you have questions about a medical condition or this instruction, always ask your healthcare professional. [place] [address] any warranty or liability for your use of this information. Introducing [facility]! Dear Zandra Tamayo: 
Thank you for requesting a UNYQ account. Our records indicate that you already have an active UNYQ account. You can access your account anytime at [email or web address] Did you know that you can access your hospital and ER discharge instructions at any time in NexDefense? You can also review all of your test results from your hospital stay or ER visit. Additional Information If you have questions, please visit the Frequently Asked Questions section of the NexDefense website at [email or web address] Remember, NexDefense is NOT to be used for urgent needs. For medical emergencies, dial 911. Now available from your iPhone and Android! Please provide this summary of care documentation to your next provider. Your primary care clinician is listed as Loreta Barron. If you have any questions after today's visit, please call [phone].

## 2018-09-11 NOTE — PROGRESS NOTES
Subjective:   Pierre Dao is a 26 y.o. female with hypertension presents for 4 days follow up. Patient Active Problem List   Diagnosis Code    Neuropathy G62.9    Diabetes mellitus ([facility] [address] E11.9    Eye problems H57.9    Toe pain M79.676    Hyperlipidemia E78.5    Diabetic gastroparesis associated with type 2 diabetes mellitus ([facility] [address] E11.43, K31.84    Hypovitaminosis D E55.9    Paroxysmal atrial fibrillation (HCC) I48.0    Chronic anticoagulation Z79.01    Transient cerebral ischemia G45.9    Renal insufficiency N28.9    Bad odor of urine R82.90    Type 2 diabetes with nephropathy (HCC) E11.21     Current Outpatient Prescriptions   Medication Sig Dispense Refill    losartan (COZAAR) 100 mg tablet Take 1 Tab by mouth daily. 90 Tab 0    simvastatin (ZOCOR) 40 mg tablet TAKE 1 TABLET BY MOUTH NIGHTLY 30 Tab 6    butalbital-acetaminophen-caffeine (FIORICET, ESGIC) -40 mg per tablet Take 1 Tab by mouth every six (6) hours as needed for Pain. 30 Tab 0    simvastatin (ZOCOR) 40 mg tablet Take 1 Tab by mouth nightly. APPOINTMENT IS REQUIRED BEFORE NEXT REFILL 15 Tab 0    rivaroxaban (XARELTO) 15 mg tab tablet Take 1 Tab by mouth daily. 30 Tab 11    propranolol LA (INDERAL LA) 80 mg SR capsule Take 1 Cap by mouth daily. Each evening 90 Cap 3    propranolol LA (INDERAL LA) 120 mg SR capsule Take 1 Cap by mouth daily. Every morning 90 Cap 3    furosemide (LASIX) 20 mg tablet Take as needed daily for leg swelling. 30 Tab 3    LEVOTHYROXINE SODIUM (SYNTHROID PO) Take  by mouth.  insulin lispro (HUMALOG) 100 unit/mL injection by SubCUTAneous route. 20-40 units with meals      pregabalin (LYRICA) 75 mg capsule Take 1 Cap by mouth three (3) times daily. 90 Cap 3    Blood-Glucose Meter monitoring kit by Does Not Apply route two (2) times a day. 1 Kit 0    glucose blood VI test strips (ONE TOUCH ULTRA TEST) strip by Does Not Apply route.  One touch ultra mini test strips 1 Package 11 Allergies   Allergen Reactions    Latex Hives    Contrast Agent [Iodine] Other (comments)     \"Burning with shooting pain\"   Joy [place] [Nitrofurantoin Monohyd/M-Cryst] Diarrhea    Novolog Mix 70-30 [Insulin Asp Prt-Insulin Aspart] Nausea and Vomiting     Past Surgical History:   Procedure Laterality Date    HX ORTHOPAEDIC  January 2013    right toe nail surgery Dr. Libertad Thomas     Family History   Problem Relation Age of Onset    Diabetes Mother     Cancer Paternal Grandmother       Lab Results   Component Value Date/Time    Cholesterol, total 288 (H) 02/09/2017 10:00 AM    Cholesterol (POC) 267 01/25/2013 12:05 PM    HDL Cholesterol 55 02/09/2017 10:00 AM    HDL Cholesterol (POC) 37 01/25/2013 12:05 PM    LDL Cholesterol (POC) 185 01/25/2013 12:05 PM    LDL, calculated 189.6 (H) 02/09/2017 10:00 AM    VLDL, calculated 43.4 02/09/2017 10:00 AM    Triglyceride 217 (H) 02/09/2017 10:00 AM    Triglycerides (POC) 225 01/25/2013 12:05 PM    CHOL/HDL Ratio 5.2 (H) 02/09/2017 10:00 AM     Lab Results   Component Value Date/Time    Sodium 141 02/09/2017 10:00 AM    Potassium 4.2 02/09/2017 10:00 AM    Chloride 106 02/09/2017 10:00 AM    CO2 25 02/09/2017 10:00 AM    Anion gap 10 02/09/2017 10:00 AM    Glucose 322 (H) 02/09/2017 10:00 AM    BUN 24 (H) 02/09/2017 10:00 AM    Creatinine 1.45 (H) 02/09/2017 10:00 AM    BUN/Creatinine ratio 17 02/09/2017 10:00 AM    GFR est AA 50 (L) 02/09/2017 10:00 AM    GFR est non-AA 42 (L) 02/09/2017 10:00 AM    Calcium 8.4 (L) 02/09/2017 10:00 AM    Bilirubin, total 0.2 09/18/2016 08:27 PM    AST (SGOT) 16 02/09/2017 10:00 AM    Alk.  phosphatase 56 09/18/2016 08:27 PM    Protein, total 7.0 09/18/2016 08:27 PM    Albumin 2.4 (L) 09/18/2016 08:27 PM    A-G Ratio 1.2 09/24/2013 11:32 AM    ALT (SGPT) 16 02/09/2017 10:00 AM     Lab Results   Component Value Date/Time    WBC 6.6 09/20/2016 04:00 AM    HGB 9.8 (L) 09/20/2016 04:00 AM    HCT 31.0 (L) 09/20/2016 04:00 AM    PLATELET 006 09/20/2016 04:00 AM    MCV 89.6 09/20/2016 04:00 AM     Wt Readings from Last 3 Encounters:   09/11/18 171 lb (77.6 kg)   08/14/18 179 lb (81.2 kg)   05/04/18 171 lb 3.2 oz (77.7 kg)     Last Point of Care HGB A1C  Hemoglobin A1c (POC)   Date Value Ref Range Status   05/04/2018 8.5 % Final      BP Readings from Last 3 Encounters:   09/11/18 105/64   08/14/18 169/88   05/04/18 141/75       Hypertension ROS: taking medications as instructed, no medication side effects noted, no TIA's, no chest pain on exertion, no dyspnea on exertion, no swelling of ankles. New concerns: reports she needs a refill on inderal 120 mg until she is able to schedule a follow up with cardiology. Has not seen cardiology in approx. 1 year since the passing of her previous cardiologist.     Objective:   Visit Vitals    /64 (BP 1 Location: Left arm, BP Patient Position: Sitting)    Pulse 91    Temp 98.4 °F (36.9 °C) (Oral)    Resp 20    Ht 5' 2\" (1.575 m)    Wt 171 lb (77.6 kg)    LMP 08/16/2018    SpO2 96%    BMI 31.28 kg/m2      General appearance - alert, well appearing, and in no distress  Neck - supple, no significant adenopathy, carotids upstroke normal bilaterally, no bruits  Chest - clear to auscultation, no wheezes, rales or rhonchi, symmetric air entry  Heart - normal rate, regular rhythm, normal S1, S2, no murmurs, rubs, clicks or gallops  Extremities - peripheral pulses normal, no pedal edema, no clubbing or cyanosis      Assessment/Plan:      ICD-10-CM ICD-9-CM    1. Essential hypertension I10 401.9    2. Paroxysmal atrial fibrillation (HCC) I48.0 427.31    3. Obesity, Class I, BMI 30-34.9 E66.9 278.00    4. Encounter for immunization Z23 V03.89 INFLUENZA VIRUS VAC QUAD,SPLIT,PRESV FREE SYRINGE IM   contact information for cardiology provided to have patient schedule a follow up appt  Refill on Inderal 120 mg granted   improved. reviewed diet, exercise and weight control.   I have discussed the diagnosis with the patient and the intended plan as seen in the above orders. The patient has received an after-visit summary and questions were answered concerning future plans. I have discussed medication side effects and warnings with the patient as well. Patient agreeable with above plan and verbalizes understanding. Follow-up Disposition:  Return in about 3 months (around 12/11/2018) for HTN.

## 2018-09-11 NOTE — PATIENT INSTRUCTIONS
Diabetic Nephropathy: Care Instructions  Your Care Instructions    Finding out that your kidneys have been damaged can be very distressing. It may have taken you by surprise, since damage to kidneys usually does not cause symptoms early on. It is normal to feel upset and afraid. Having diabetic nephropathy means that for some time you have had high blood sugar, which damages the kidneys. Healthy kidneys keep protein in your blood, where it belongs. Damaged kidneys do not work the way they should. Your kidneys are letting protein pass into your urine. Sometimes diabetic kidney disease can lead to kidney failure. Your doctor will tell you how you might be able to slow damage to your kidneys. In many cases, prompt and regular treatment can prevent kidney failure. You will need to take medicine and may need to make a number of changes in your normal routines. If you can keep your blood sugar and blood pressure under control and take certain medicines, you may reduce your chance of kidney failure. Follow-up care is a key part of your treatment and safety. Be sure to make and go to all appointments, and call your doctor if you are having problems. It's also a good idea to know your test results and keep a list of the medicines you take. How can you care for yourself at home? · Take your medicines exactly as prescribed. It is very important that you take your insulin or other diabetes medicine as your doctor tells you. Call your doctor if you think you are having a problem with your medicine. · Try to keep blood sugar in your target range. ¨ Eat a variety of foods, with carbohydrate spread out in your meals. Your doctor may restrict your protein. A dietitian can help you plan meals. ¨ If your doctor recommends it, get more exercise. Walking is a good choice. Bit by bit, increase the amount you walk every day. Try for at least 30 minutes on most days of the week.   ¨ Check your blood sugar as often as your doctor recommends. · Take and record your blood pressure at home if your doctor tells you to. Learn the importance of the two measures of blood pressure (such as 130 over 80, or 130/80). To take your blood pressure at home:  ¨ Ask your doctor to check your blood pressure monitor. He or she can make sure that it is accurate and that the cuff fits you. Also ask your doctor to watch you to make sure that you are using it right. ¨ Do not eat, use tobacco products, or use medicine known to raise blood pressure (such as some nasal decongestant sprays) before taking your blood pressure. ¨ Avoid taking your blood pressure if you have just exercised or are nervous or upset. Rest at least 15 minutes before taking a reading. · Eat a low-salt diet to help keep your blood pressure in your target range. · Do not smoke. Smoking raises your risk of many health problems, including diabetic nephropathy. If you need help quitting, talk to your doctor about stop-smoking programs and medicines. These can increase your chances of quitting for good. · Do not take ibuprofen, naproxen, or similar medicines, unless your doctor tells you to. These medicines may make kidney problems worse. When should you call for help? Call 911 anytime you think you may need emergency care. For example, call if:    · You passed out (lost consciousness).    Call your doctor now or seek immediate medical care if:    · You have new or worse nausea and vomiting.     · You have much less urine than normal, or you have no urine.     · You are feeling confused or cannot think clearly.     · You have new or more blood in your urine.     · You have new swelling.     · You are dizzy or lightheaded, or feel like you may faint.    Watch closely for changes in your health, and be sure to contact your doctor if:    · You do not get better as expected. Where can you learn more? Go to http://aram-klaus.info/.   Enter P361 in the search box to learn more about \"Diabetic Nephropathy: Care Instructions. \"  Current as of: May 12, 2017  Content Version: 11.7  © 1223-5741 [employer]. Care instructions adapted under license by [employer] (which disclaims liability or warranty for this information). If you have questions about a medical condition or this instruction, always ask your healthcare professional. [address] any warranty or liability for your use of this information.

## 2018-09-11 NOTE — PROGRESS NOTES
Chief Complaint   Patient presents with    Medication Evaluation     Losartan     1. Have you been to the ER, urgent care clinic since your last visit? Hospitalized since your last visit? No    2. Have you seen or consulted any other health care providers outside of the [address] [place] [address] since your last visit? Include any pap smears or colon screening.  No

## 2018-09-20 DIAGNOSIS — I10 ESSENTIAL HYPERTENSION WITH GOAL BLOOD PRESSURE LESS THAN 130/80: ICD-10-CM

## 2018-09-20 RX ORDER — PROPRANOLOL HYDROCHLORIDE 120 MG/1
120 CAPSULE, EXTENDED RELEASE ORAL DAILY
Qty: 90 CAP | Refills: 3 | Status: SHIPPED | OUTPATIENT
Start: 2018-09-20 | End: 2019-03-08 | Stop reason: ALTCHOICE

## 2018-11-19 ENCOUNTER — TELEPHONE (OUTPATIENT)
Dept: FAMILY MEDICINE CLINIC | Age: 33
End: 2018-11-19

## 2018-11-19 NOTE — TELEPHONE ENCOUNTER
Pt states the vaginal discharge has an odor, is heavy & white. Pt aware she would need to schedule an appt to be seen since a yeast infection doesn't general have an odor & PCP would not want to treat the infection without seeing Pt. Pt states she is unable to make an appt due to transportation issues. Recommend Pt try OTC meds & if no improvement try to schedule an appt with Juan Quiñones NP.   Juan Quiñones NP aware of conversation with Pt

## 2018-11-19 NOTE — TELEPHONE ENCOUNTER
Pt says she has a yeast, and would like for the provider to call in [facility] (7 day pill). I asked the Pt to schedule an appt to be seen but she said she was going to call back to schedule she could not schedule at the moment. Please advise.          [phone]

## 2018-12-11 ENCOUNTER — OFFICE VISIT (OUTPATIENT)
Dept: FAMILY MEDICINE CLINIC | Age: 33
End: 2018-12-11

## 2018-12-11 VITALS
WEIGHT: 176.6 LBS | BODY MASS INDEX: 32.5 KG/M2 | HEART RATE: 102 BPM | HEIGHT: 62 IN | DIASTOLIC BLOOD PRESSURE: 90 MMHG | TEMPERATURE: 98.9 F | RESPIRATION RATE: 16 BRPM | SYSTOLIC BLOOD PRESSURE: 174 MMHG | OXYGEN SATURATION: 97 %

## 2018-12-11 DIAGNOSIS — I10 ESSENTIAL HYPERTENSION: Primary | ICD-10-CM

## 2018-12-11 DIAGNOSIS — Z79.4 TYPE 2 DIABETES MELLITUS WITH COMPLICATION, WITH LONG-TERM CURRENT USE OF INSULIN (HCC): ICD-10-CM

## 2018-12-11 DIAGNOSIS — E11.8 TYPE 2 DIABETES MELLITUS WITH COMPLICATION, WITH LONG-TERM CURRENT USE OF INSULIN (HCC): ICD-10-CM

## 2018-12-11 DIAGNOSIS — E78.00 PURE HYPERCHOLESTEROLEMIA: ICD-10-CM

## 2018-12-11 RX ORDER — SODIUM BICARBONATE 650 MG/1
TABLET ORAL
Refills: 2 | COMMUNITY
Start: 2018-11-21 | End: 2020-01-23 | Stop reason: SDUPTHER

## 2018-12-11 RX ORDER — CALCIUM CARBONATE 750 MG/1
TABLET, CHEWABLE ORAL
Qty: 1 KIT | Refills: 0 | Status: SHIPPED | OUTPATIENT
Start: 2018-12-11 | End: 2019-01-03

## 2018-12-11 RX ORDER — PREGABALIN 75 MG/1
75 CAPSULE ORAL 3 TIMES DAILY
Qty: 90 CAP | Refills: 3 | Status: SHIPPED | OUTPATIENT
Start: 2018-12-11 | End: 2019-09-12 | Stop reason: SDUPTHER

## 2018-12-11 NOTE — PROGRESS NOTES
Pt is here for f/u HTN    1. Have you been to the ER, urgent care clinic since your last visit? Hospitalized since your last visit? No    2. Have you seen or consulted any other health care providers outside of the [address] [place] [address] since your last visit? Include any pap smears or colon screening.  No

## 2018-12-11 NOTE — PROGRESS NOTES
Subjective:   Jose Carlos Lambert is a 26 y.o. female with hypertension presents for follow up. Patient Active Problem List   Diagnosis Code    Neuropathy G62.9    Diabetes mellitus ([facility] [address] E11.9    Eye problems H57.9    Toe pain M79.676    Hyperlipidemia E78.5    [facility]abetic [facility]ares[facility] with [facility] E11.43, K31.84    Hypovitaminosis D E55.9    Paroxysmal atrial fibrillation (HCC) I48.0    Chronic anticoagulation Z79.01    Transient cerebral ischemia G45.9    Renal insufficiency N28.9    Bad odor of urine R82.90    Type 2 diabetes with nephropathy (HCC) E11.21     Current Outpatient Medications   Medication Sig Dispense Refill    propranolol LA (INDERAL LA) 120 mg SR capsule Take 1 Cap by mouth daily. Every morning 90 Cap 3    losartan (COZAAR) 100 mg tablet Take 1 Tab by mouth daily. 90 Tab 0    simvastatin (ZOCOR) 40 mg tablet TAKE 1 TABLET BY MOUTH NIGHTLY 30 Tab 6    rivaroxaban (XARELTO) 15 mg tab tablet Take 1 Tab by mouth daily. 30 Tab 11    furosemide (LASIX) 20 mg tablet Take as needed daily for leg swelling. 30 Tab 3    insulin lispro (HUMALOG) 100 unit/mL injection by SubCUTAneous route. 20-40 units with meals      Blood-Glucose Meter monitoring kit by Does Not Apply route two (2) times a day. 1 Kit 0    glucose blood VI test strips (ONE TOUCH ULTRA TEST) strip by Does Not Apply route. One touch ultra mini test strips 1 Package 11    butalbital-acetaminophen-caffeine (FIORICET, ESGIC) -40 mg per tablet Take 1 Tab by mouth every six (6) hours as needed for Pain. 30 Tab 0    LEVOTHYROXINE SODIUM (SYNTHROID PO) Take  by mouth.  pregabalin (LYRICA) 75 mg capsule Take 1 Cap by mouth three (3) times daily.  90 Cap 3      Allergies   Allergen Reactions    Latex Hives    Contrast Agent [Iodine] Other (comments)     \"Burning with shooting pain\"    Macrobid [Nitrofurantoin Monohyd/M-Cryst] Diarrhea    Novolog Mix 70-30 [Insulin Asp Prt-Insulin Aspart] Nausea and Vomiting     Past Surgical History:   Procedure Laterality Date    HX ORTHOPAEDIC  January 2013    right toe nail surgery Dr. Lore Phliip Age of Onset    Diabetes Mother     Cancer Paternal Grandmother       Lab Results   Component Value Date/Time    Cholesterol, total 288 (H) 02/09/2017 10:00 AM    Cholesterol (POC) 267 01/25/2013 12:05 PM    HDL Cholesterol 55 02/09/2017 10:00 AM    HDL Cholesterol (POC) 37 01/25/2013 12:05 PM    LDL Cholesterol (POC) 185 01/25/2013 12:05 PM    LDL, calculated 189.6 (H) 02/09/2017 10:00 AM    VLDL, calculated 43.4 02/09/2017 10:00 AM    Triglyceride 217 (H) 02/09/2017 10:00 AM    Triglycerides (POC) 225 01/25/2013 12:05 PM    CHOL/HDL Ratio 5.2 (H) 02/09/2017 10:00 AM     Lab Results   Component Value Date/Time    Sodium 141 02/09/2017 10:00 AM    Potassium 4.2 02/09/2017 10:00 AM    Chloride 106 02/09/2017 10:00 AM    CO2 25 02/09/2017 10:00 AM    Anion gap 10 02/09/2017 10:00 AM    Glucose 322 (H) 02/09/2017 10:00 AM    BUN 24 (H) 02/09/2017 10:00 AM    Creatinine 1.45 (H) 02/09/2017 10:00 AM    BUN/Creatinine ratio 17 02/09/2017 10:00 AM    GFR est AA 50 (L) 02/09/2017 10:00 AM    GFR est non-AA 42 (L) 02/09/2017 10:00 AM    Calcium 8.4 (L) 02/09/2017 10:00 AM    Bilirubin, total 0.2 09/18/2016 08:27 PM    AST (SGOT) 16 02/09/2017 10:00 AM    Alk.  phosphatase 56 09/18/2016 08:27 PM    Protein, total 7.0 09/18/2016 08:27 PM    Albumin 2.4 (L) 09/18/2016 08:27 PM    A-G Ratio 1.2 09/24/2013 11:32 AM    ALT (SGPT) 16 02/09/2017 10:00 AM     Lab Results   Component Value Date/Time    WBC 6.6 09/20/2016 04:00 AM    HGB 9.8 (L) 09/20/2016 04:00 AM    HCT 31.0 (L) 09/20/2016 04:00 AM    PLATELET 002 30/51/1694 04:00 AM    MCV 89.6 09/20/2016 04:00 AM     Wt Readings from Last 3 Encounters:   12/11/18 176 lb 9.6 oz (80.1 kg)   09/11/18 171 lb (77.6 kg)   08/14/18 179 lb (81.2 kg)     Last Point of Care HGB A1C  Hemoglobin A1c (POC)   Date Value Ref Range Status   05/04/2018 8.5 % Final      BP Readings from Last 3 Encounters:   12/11/18 (!) 170/105   09/11/18 105/64   08/14/18 169/88       Hypertension ROS: not taking medications regularly as instructed, no medication side effects noted, no TIA's, no chest pain on exertion, no dyspnea on exertion, no swelling of ankles, no orthostatic dizziness or lightheadedness, no orthopnea or paroxysmal nocturnal dyspnea, no palpitations. New concerns: patient was seen by nephrology 11/21/18. States she had labs performed and was informed her renal function had decreased from a '5' to a '3'. Reports she doesn't fully understand what that means. States Dr. Khang Proctor informed her the goal is to delay the progression of her renal function as long as possible. States it was reinforced to her she needs better control of her blood pressure. Objective:   Visit Vitals  /90 (BP 1 Location: Right arm, BP Patient Position: Sitting)   Pulse (!) 102   Temp 98.9 °F (37.2 °C) (Oral)   Resp 16   Ht 5' 2\" (1.575 m)   Wt 176 lb 9.6 oz (80.1 kg)   LMP 12/04/2018 (Exact Date)   SpO2 97%   BMI 32.30 kg/m²      General appearance - alert, well appearing, and in no distress  Neck - supple, no significant adenopathy, carotids upstroke normal bilaterally, no bruits  Chest - clear to auscultation, no wheezes, rales or rhonchi, symmetric air entry  Heart - normal rate, regular rhythm, normal S1, S2, no murmurs, rubs, clicks or gallops  Extremities - peripheral pulses normal, no pedal edema, no clubbing or cyanosis        Assessment/Plan:      ICD-10-CM ICD-9-CM    1. Essential hypertension I10 401.9    2. Type 2 diabetes mellitus with complication, with long-term current use of insulin (HCC) E11.8 250.90 pregabalin (LYRICA) 75 mg capsule    Z79.4 V58.67    3.  Pure hypercholesterolemia E78.00 272.0      reviewed diet, exercise and weight control  Patient has not taken losartan today, instructed to take medication when she returns home  needs improvement, patient poorly compliant. I have discussed the diagnosis with the patient and the intended plan as seen in the above orders. The patient has received an after-visit summary and questions were answered concerning future plans. I have discussed medication side effects and warnings with the patient as well. Patient agreeable with above plan and verbalizes understanding. Follow-up Disposition:  Return in about 6 weeks (around 1/22/2019) for HTN.

## 2018-12-11 NOTE — PATIENT INSTRUCTIONS
Learning About Foot and [address] your loved one's feet and keeping them clean and soft can help prevent cracks and infection in the skin. This is especially important for people who have diabetes. Keeping toenails trimmed--and polished if that's what the person likes--also helps the person feel well-groomed. If the person you care for has diabetes or has foot problems, such as bad bunions and corns, think about taking them to see a podiatrist. This is a doctor who specializes in the care of the feet. Sometimes a podiatrist will come to the home if the person can't go out for visits. Try to take the person for salon pedicures if that is what they want. It's a chance to get out and see people and continue a favorite activity. You can do basic nail care at home. Usually all you need to do is keep the nails clean and at a safe length. How do you trim someone's toenails? Try to trim the person's nails every week. Or check the nails each week to see if they need to be trimmed. It's easiest to trim nails after the person has had a shower or foot bath. It makes the nails softer and easier to trim. Start by gathering your supplies. You will need toenail clippers and a nail file. You may also need nail polish and nail polish remover. To trim the nails:  1. Wash and dry your hands. You don't need to wear gloves. 2. Use nail polish remover to take off any polish. 3. Hold the person's foot and toe steady with one hand while you trim the nail with your other hand. Trim the nails straight across. Leave the nails a little longer at the corners so that the sharp ends don't cut into the skin. 4. Keep the nails no longer than the tip of the toes. 5. Let the nails dry if they are still damp and soft. 6. Use a nail file to gently smooth the edges of the nails, especially at the corners. They may be sharp after the nails are cut straight.   7. Apply nail polish, if the person wants it. If the person's nails are thick and discolored, it may be safest to have a podiatrist cut them. What else do you need to know? When you're caring for someone's nails, it is important to remember not to trim or cut the cuticles. A minor cut in a cuticle could lead to an infection. Wash the feet daily in the shower or bath or in a basin made for washing feet. It's extra important to wash the feet carefully if the person has diabetes. After washing the feet, dry gently. Put lotion on the feet, especially on the heels. But don't put it between the toes. If the person doesn't have diabetes and you see signs of athlete's foot (such as dry, cracking, or itchy skin between the toes), you can try an over-the-counter medicine. These medicines can kill the fungus that causes athlete's foot. If the problem doesn't go away, talk to the person's doctor. Look every day for cuts or signs of infection, such as pain, swelling, redness, or warmth. If you see any of these signs--especially in someone who has diabetes--call the doctor. Where can you learn more? Go to http://aram-klaus.info/. Enter A726 in the search box to learn more about \"Learning About Foot and Toenail Care. \"  Current as of: April 19, 2018  Content Version: 11.8  © 4308-7228 [employer]. Care instructions adapted under license by [employer] (which disclaims liability or warranty for this information). If you have questions about a medical condition or this instruction, always ask your healthcare professional. [place] [address] any warranty or liability for your use of this information.

## 2019-01-03 PROBLEM — G93.40 ENCEPHALOPATHY ACUTE: Status: ACTIVE | Noted: 2019-01-03

## 2019-01-08 ENCOUNTER — PATIENT OUTREACH (OUTPATIENT)
Dept: FAMILY MEDICINE CLINIC | Age: 34
End: 2019-01-08

## 2019-01-08 NOTE — PROGRESS NOTES
Hospital Discharge Follow-Up Date/Time:  2019 10:58 AM 
 
Patient was admitted to [facility] on 1/3/19 and discharged on 19  For:  
Discharge Diagnosis:  
1 No seizure 2 Acute metabolic encephalopathy, likely secondary to hypoglycemia 
3 A. fib with RVR, with chronic A. fib 
4 diabetes mellitus, insulin-dependent 
5 hypoglycemia 
6 history of CVA 
7 hypertension 8 diabetic neuropathy 9 diabetic retinopathy 10 hyperlipidemia * per discharge summary of 19 by Dr. Raúl Irby The physician discharge summary was available at the time of outreach. Patient was contacted within 2  business days of discharge. Top Challenges reviewed with the provider CONCERNS WHICH NEED CLOSE FOLLOW-UP: 
1. adjust insulin pump to prevent hypoglycemia Per discharge summary of 19.  
 
- Patient reports she is scared to put on insulin pump because she is afraid that the insulin dosage is too high. Patient states that she has an appointment with endocrinology office on 19 for follow up.   
  
 
Method of communication with provider : 
Chart routed to Jojo Rouse and Dr. Emilie Horta. Chart routed to LUZMARIA Cavazos For review/monitoring. Inpatient RRAT score: Not calculated Was this a readmission? no  
Patient stated reason for the readmission: n/a Nurse Navigator (NN) contacted the patient by telephone to perform post hospital discharge assessment. Verified name and  with patient as identifiers. Provided introduction to self, and explanation of the Nurse Navigator role. Reviewed discharge instructions and red flags with patient who verbalized understanding. Patient given an opportunity to ask questions and does not have any further questions or concerns at this time. The patient agrees to contact the PCP office for questions related to their healthcare. NN provided contact information for future reference. Patient Denies: - Feeling dizzy - Feeling Shaky 
- Confusion Patient reports: - Feeling weak sometimes but improved from when she was in the hospital  
- Hard to talk and mouth sore 
-\"Feels like someone pushed me down a flight of stairs\" - Scared to put insulin pump back on. Disease Specific:   N/A Summary of patient's top problems: 1. Patient states she scared to put insulin pump back on. Patient states that she thinks the insulin dosage is too much. Patient has a follow up scheduled with endocrinology office for 1-9-18. Home Health orders at discharge: Yes SN, PT, OT Home Health company:  [address] Date of initial visit: 1-6-19 Durable Medical Equipment ordered/company: [employer] Durable Medical Equipment received: yes Barriers to care? 
- Patient lives alone with her 6year old son. Patient's parents are support systems and live in the same city. Advance Care Planning:  
Does patient have an Advance Directive:  not on file Medication(s):  
New Medications at Discharge: - None noted Changed Medications at Discharge: - None noted Discontinued Medications at Discharge: - None noted Medication reconciliation was performed with patient, who verbalizes understanding of administration of home medications. There were no barriers to obtaining medications identified at this time. Patient states that Xarelto is being refilled. Patient reports she is scared to put on insulin pump. Patient has an appointment with endocrinology office on 1-9-19. Referral to Pharm D needed: yes 
- review/monitoring Current Outpatient Medications Medication Sig  
 amLODIPine (NORVASC) 5 mg tablet Take 5 mg by mouth daily.  pregabalin (LYRICA) 75 mg capsule Take 1 Cap by mouth three (3) times daily.  sodium bicarbonate 650 mg tablet take 1 tablet by mouth 2 times a day  propranolol LA (INDERAL LA) 120 mg SR capsule Take 1 Cap by mouth daily. Every morning  losartan (COZAAR) 100 mg tablet Take 1 Tab by mouth daily.  simvastatin (ZOCOR) 40 mg tablet TAKE 1 TABLET BY MOUTH NIGHTLY  butalbital-acetaminophen-caffeine (FIORICET, ESGIC) -40 mg per tablet Take 1 Tab by mouth every six (6) hours as needed for Pain.  rivaroxaban (XARELTO) 15 mg tab tablet Take 1 Tab by mouth daily.  furosemide (LASIX) 20 mg tablet Take as needed daily for leg swelling. (Patient taking differently: Take 20 mg by mouth as needed. Take as needed daily for leg swelling.)  Blood-Glucose Meter monitoring kit by Does Not Apply route two (2) times a day.  glucose blood VI test strips (ONE TOUCH ULTRA TEST) strip by Does Not Apply route. One touch ultra mini test strips  insulin pump (PATIENT SUPPLIED) [facility] by SubCUTAneous route as needed.  insulin lispro (HUMALOG) 100 unit/mL injection Given via pump. Pump set every 90 days in provider's office. No current facility-administered medications for this visit. There are no discontinued medications. [facility] follow up appointment(s):  
Future Appointments Date Time Provider Brian Clifford 1/22/2019 11:00 AM Trev Bennett NP 11 [facility] Patient offered an earlier appointment with Dr. Joshua San. Patient stated she prefers to follow up with [facility] on 1-22-19. Non-[facility] follow up appointment(s):  
None noted [facility]:  out of service area Goals  Attends follow-up appointments as directed. [facility] Date:  2-5-19 1/8/2019 PCP appointment scheduled for 1/22/19. Sooner appointment offered with Dr. Joshua San. Patient declined and wants to follow up with [facility] as planned.  Prevent complications post hospitalization. Target Date:  2/5/19 1/8/2019 Patient made aware that she can contact PCP office 24 hours a day to include evenings, weekends,and holidays. Please call the office phone number for assistance.  Supportive resources in place to maintain patient in the community (ie. Home Health, DME equipment, refer to, medication assistant plan, etc.) Target Date:  2/5/19 1/8/2019 [address] providing skilled home care services to include: - Nursing - PT 
- OT  
  
  Understands red flags post discharge. Target Date:  2/5/19 1/8/2019 Red flags reviewed with patient as follows:  
- Dizziness 
- Shakiness - Sweating  
- Confusion 
- feeling weak Patient asked to follow up with PCP or ED as needed for red flag symtoms Patient reports she was diagnosed with diabetes at 21years old and is a Type 2 diabetic.

## 2019-01-10 LAB — HBA1C MFR BLD HPLC: 8 %

## 2019-01-18 ENCOUNTER — PATIENT OUTREACH (OUTPATIENT)
Dept: FAMILY MEDICINE CLINIC | Age: 34
End: 2019-01-18

## 2019-01-18 NOTE — PROGRESS NOTES
Nurse Navigator called patient. A female answered. When nurse navigator asked or patient the phone line was disconnected. Nurse Navigator will continue to follow case.

## 2019-01-22 ENCOUNTER — OFFICE VISIT (OUTPATIENT)
Dept: FAMILY MEDICINE CLINIC | Age: 34
End: 2019-01-22

## 2019-01-22 VITALS
OXYGEN SATURATION: 97 % | HEART RATE: 90 BPM | RESPIRATION RATE: 16 BRPM | DIASTOLIC BLOOD PRESSURE: 97 MMHG | HEIGHT: 62 IN | WEIGHT: 177.6 LBS | TEMPERATURE: 98.1 F | BODY MASS INDEX: 32.68 KG/M2 | SYSTOLIC BLOOD PRESSURE: 162 MMHG

## 2019-01-22 DIAGNOSIS — E11.8 TYPE 2 DIABETES MELLITUS WITH COMPLICATION, WITH LONG-TERM CURRENT USE OF INSULIN (HCC): ICD-10-CM

## 2019-01-22 DIAGNOSIS — E66.9 OBESITY, CLASS I, BMI 30-34.9: ICD-10-CM

## 2019-01-22 DIAGNOSIS — Z09 HOSPITAL DISCHARGE FOLLOW-UP: ICD-10-CM

## 2019-01-22 DIAGNOSIS — Z79.4 TYPE 2 DIABETES MELLITUS WITH COMPLICATION, WITH LONG-TERM CURRENT USE OF INSULIN (HCC): ICD-10-CM

## 2019-01-22 DIAGNOSIS — R80.9 MICROALBUMINURIA: ICD-10-CM

## 2019-01-22 DIAGNOSIS — I10 ESSENTIAL HYPERTENSION: Primary | ICD-10-CM

## 2019-01-22 DIAGNOSIS — E78.00 PURE HYPERCHOLESTEROLEMIA: ICD-10-CM

## 2019-01-22 RX ORDER — FLUCONAZOLE 150 MG/1
150 TABLET ORAL
Qty: 3 TAB | Refills: 0 | Status: SHIPPED | OUTPATIENT
Start: 2019-01-22 | End: 2019-01-29

## 2019-01-22 RX ORDER — METRONIDAZOLE 7.5 MG/G
GEL TOPICAL 2 TIMES DAILY
Qty: 60 G | Refills: 0 | Status: SHIPPED | OUTPATIENT
Start: 2019-01-22 | End: 2019-12-12 | Stop reason: SDUPTHER

## 2019-01-22 RX ORDER — IRBESARTAN 150 MG/1
150 TABLET ORAL DAILY
Qty: 90 TAB | Refills: 0 | Status: SHIPPED | OUTPATIENT
Start: 2019-01-22 | End: 2019-04-23 | Stop reason: SDUPTHER

## 2019-01-22 NOTE — PROGRESS NOTES
Pt has been taking the losartan intermittently. States she has not taken any of her medications today as she has not eaten. Pt would like to discuss concerns about losartan.

## 2019-01-22 NOTE — PROGRESS NOTES
Pt is here for 6 week f/u HTN    1. Have you been to the ER, urgent care clinic since your last visit? Hospitalized since your last visit? Yes 1/3/19 [facility] encephalopathy, seizure, low blood sugar    2. Have you seen or consulted any other health care providers outside of the [address] [place] [address] since your last visit? Include any pap smears or colon screening.  Yes Endocrinology-[facility] for insulin pump 1/19

## 2019-01-22 NOTE — PROGRESS NOTES
Subjective:   Shey Reilly is a [address] [address] [address] y.o. female with hypertension presents for 6 weeks follow up. Patient Active Problem List   Diagnosis Code    Neuropathy G62.9    Diabetes mellitus ([facility] [address] E11.9    Eye problems H57.9    Toe pain M79.676    Hyperlipidemia E78.5    Diabetic gastroparesis associated with type 2 diabetes mellitus ([facility] [address] E11.43, K31.84    Hypovitaminosis D E55.9    Paroxysmal atrial fibrillation (HCC) I48.0    Chronic anticoagulation Z79.01    Transient cerebral ischemia G45.9    Renal insufficiency N28.9    Bad odor of urine R82.90    Type 2 diabetes with nephropathy (HCC) E11.21    Encephalopathy acute G93.40     Current Outpatient Medications   Medication Sig Dispense Refill    amLODIPine (NORVASC) 5 mg tablet Take 5 mg by mouth daily.  insulin pump (PATIENT SUPPLIED) misc by SubCUTAneous route as needed.  pregabalin (LYRICA) 75 mg capsule Take 1 Cap by mouth three (3) times daily. 90 Cap 3    sodium bicarbonate 650 mg tablet take 1 tablet by mouth 2 times a day  2    propranolol LA (INDERAL LA) 120 mg SR capsule Take 1 Cap by mouth daily. Every morning 90 Cap 3    simvastatin (ZOCOR) 40 mg tablet TAKE 1 TABLET BY MOUTH NIGHTLY 30 Tab 6    rivaroxaban (XARELTO) 15 mg tab tablet Take 1 Tab by mouth daily. 30 Tab 11    furosemide (LASIX) 20 mg tablet Take as needed daily for leg swelling. (Patient taking differently: Take 20 mg by mouth as needed. Take as needed daily for leg swelling.) 30 Tab 3    insulin lispro (HUMALOG) 100 unit/mL injection Given via pump. Pump set every 90 days in provider's office.  Blood-Glucose Meter monitoring kit by Does Not Apply route two (2) times a day. 1 Kit 0    glucose blood VI test strips (ONE TOUCH ULTRA TEST) strip by Does Not Apply route. One touch ultra mini test strips 1 Package 11    losartan (COZAAR) 100 mg tablet Take 1 Tab by mouth daily.  90 Tab 0    butalbital-acetaminophen-caffeine (FIORICET, ESGIC) -40 mg per tablet Take 1 Tab by mouth every six (6) hours as needed for Pain. 27 Tab 0      Allergies   Allergen Reactions    Latex Hives    Contrast Agent [Iodine] Other (comments)     \"Burning with shooting pain\"    Macrobid [Nitrofurantoin Monohyd/M-Cryst] Diarrhea    Novolog Mix 70-30 [Insulin Asp Prt-Insulin Aspart] Nausea and Vomiting     Past Surgical History:   Procedure Laterality Date    HX ORTHOPAEDIC  January 2013    right toe nail surgery Dr. Nayeli Jackson     Family History   Problem Relation Age of Onset    Diabetes Mother     Cancer Paternal Grandmother       Lab Results   Component Value Date/Time    Cholesterol, total 288 (H) 02/09/2017 10:00 AM    Cholesterol (POC) 267 01/25/2013 12:05 PM    HDL Cholesterol 55 02/09/2017 10:00 AM    HDL Cholesterol (POC) 37 01/25/2013 12:05 PM    LDL Cholesterol (POC) 185 01/25/2013 12:05 PM    LDL, calculated 189.6 (H) 02/09/2017 10:00 AM    VLDL, calculated 43.4 02/09/2017 10:00 AM    Triglyceride 217 (H) 02/09/2017 10:00 AM    Triglycerides (POC) 225 01/25/2013 12:05 PM    CHOL/HDL Ratio 5.2 (H) 02/09/2017 10:00 AM     Lab Results   Component Value Date/Time    Sodium 145 01/03/2019 09:42 AM    Potassium 4.3 01/03/2019 09:42 AM    Chloride 108 (H) 01/03/2019 09:42 AM    CO2 22 01/03/2019 09:20 AM    CO2, TOTAL 22 01/03/2019 09:42 AM    Anion gap 7 01/03/2019 09:20 AM    Glucose 62 (L) 01/03/2019 09:42 AM    BUN 32 (H) 01/03/2019 09:42 AM    Creatinine 2.3 (H) 01/03/2019 09:42 AM    BUN/Creatinine ratio 17 02/09/2017 10:00 AM    GFR est AA 33.0 01/03/2019 09:20 AM    GFR est non-AA 27 01/03/2019 09:20 AM    Calcium 9.3 01/03/2019 09:20 AM    Bilirubin, total 0.2 01/03/2019 09:20 AM    AST (SGOT) 45 (H) 01/03/2019 09:20 AM    Alk.  phosphatase 88 01/03/2019 09:20 AM    Protein, total 8.3 (H) 01/03/2019 09:20 AM    Albumin 3.1 (L) 01/03/2019 09:20 AM    A-G Ratio 1.2 09/24/2013 11:32 AM    ALT (SGPT) 24 01/03/2019 09:20 AM     Lab Results   Component Value Date/Time WBC 5.1 01/03/2019 09:20 AM    HGB 12.6 (L) 01/03/2019 09:42 AM    HCT 37 (L) 01/03/2019 09:42 AM    PLATELET 697 27/03/5124 09:20 AM    MCV 89.0 01/03/2019 09:20 AM     Wt Readings from Last 3 Encounters:   01/22/19 177 lb 9.6 oz (80.6 kg)   01/05/19 176 lb 5.9 oz (80 kg)   12/11/18 176 lb 9.6 oz (80.1 kg)     Last Point of Care HGB A1C  Hemoglobin A1c (POC)   Date Value Ref Range Status   05/04/2018 8.5 % Final      BP Readings from Last 3 Encounters:   01/22/19 (!) 162/97   01/05/19 (!) 166/94   12/11/18 174/90       Hypertension ROS: not taking medications regularly as instructed, no medication side effects noted, no TIA's, no chest pain on exertion, no dyspnea on exertion, no swelling of ankles. Due to recent recalls with certain losartan products. Patient would like her medication changed. New concerns: patient was [facility] to [facility]15[facility]19. States she had open sores in her mouth making it hard to eat. States sores have resolved. Comments her son has informed patient her gait was off. States due to her being able to ambulate on her own. Objective:   Visit Vitals  BP (!) 162/97 (BP 1 Location: Left arm)   Pulse 90   Temp 98.1 °F (36.7 °C) (Oral)   Resp 16   Ht 5' 2\" (1.575 m)   Wt 177 lb 9.6 oz (80.6 kg)   LMP 01/02/2019   SpO2 97%   BMI 32.48 kg/m²      General appearance - alert, well appearing, and in no distress  Neck - supple, no significant adenopathy, carotids upstroke normal bilaterally, no bruits  Chest - clear to auscultation, no wheezes, rales or rhonchi, symmetric air entry  Heart - normal rate, regular rhythm, normal S1, S2, no murmurs, rubs, clicks or gallops  Extremities - peripheral pulses normal, no pedal edema, no clubbing or cyanosis  General appearance - alert, well appearing, and in no distress      Assessment/Plan:      ICD-10-CM ICD-9-CM    1. Essential hypertension I10 401.9 irbesartan (AVAPRO) 150 mg tablet   2.  Hospital discharge follow-up Z09 V67. 59    3. Type 2 diabetes mellitus with complication, with long-term current use of insulin (HCC) E11.8 250.90     Z79.4 V58.67    4. Pure hypercholesterolemia E78.00 272.0    5. Obesity, Class I, BMI 30-34.9 E66.9 278.00    6. Microalbuminuria R80.9 791.0       reviewed diet, exercise and weight control. Has a follow up with nephrologist 1/28/19  I have discussed the diagnosis with the patient and the intended plan as seen in the above orders. The patient has received an after-visit summary and questions were answered concerning future plans. I have discussed medication side effects and warnings with the patient as well. Patient agreeable with above plan and verbalizes understanding. Follow-up Disposition:  Return in about 6 weeks (around 3/5/2019) for HTN.

## 2019-01-22 NOTE — PATIENT INSTRUCTIONS
Diabetes Blood Sugar Emergencies: Your Action Plan  How can you prevent a blood sugar emergency? An important part of living with diabetes is keeping your blood sugar in your target range. You'll need to know what to do if it's too high or too low. Managing your blood sugar levels helps you avoid emergencies. This care sheet will teach you about the signs of high and low blood sugar. It will help you make an action plan with your doctor for when these signs occur. Low blood sugar is more likely to happen if you take certain medicines for diabetes. It can also happen if you skip a meal, drink alcohol, or exercise more than usual.  You may get high blood sugar if you eat differently than you normally do. One example is eating more carbohydrate than usual. Having a cold, the flu, or other sudden illness can also cause high blood sugar levels. Levels can also rise if you miss a dose of medicine. Any change in how you take your medicine may affect your blood sugar level. So it's important to work with your doctor before you make any changes. Check your blood sugar  Work with your doctor to fill in the blank spaces below that apply to you. Track your levels, know your target range, and write down ways you can get your blood sugar back in your target range. A log book can help you track your levels. Take the book to all of your medical appointments. · Check your blood sugar _____ times a day, at these times:________________________________________________. (For example: Before meals, at bedtime, before exercise, during exercise, other.)  · Your blood sugar target range before a meal is ___________________. Your blood sugar target range after a meal is _______________________. · Do this--___________________________________________________--to get your blood sugar back within your safe range if your blood sugar results are _________________________________________.  (For example: Less than 70 or above 250 mg/dL.)  Call your doctor when your blood sugar results are ___________________________________. (For example: Less than 70 or above 250 mg/dL.)  What are the symptoms of low and high blood sugar? Common symptoms of low blood sugar are sweating and feeling shaky, weak, hungry, or confused. Symptoms can start quickly. Common symptoms of high blood sugar are feeling very thirsty or very hungry. You may also pass urine more often than usual. You may have blurry vision and may lose weight without trying. But some people may have high or low blood sugar without having any symptoms. That's a good reason to check your blood sugar on a regular schedule. What should you do if you have symptoms? Work with your doctor to fill in the blank spaces below that apply to you. Low blood sugar  If you have symptoms of low blood sugar, check your blood sugar. If it's below _____ ( for example, below 70), eat or drink a quick-sugar food that has about 15 grams of carbohydrate. Your goal is to get your level back to your safe range. Check your blood sugar again 15 minutes later. If it's still not in your target range, take another 15 grams of carbohydrate and check your blood sugar again in 15 minutes. Repeat this until you reach your target. Then go back to your regular testing schedule. When you have low blood sugar, it's best to stop or reduce any physical activity until your blood sugar is back in your target range and is stable. If you must stay active, eat or drink 30 grams of carbohydrate. Then check your blood sugar again in 15 minutes. If it's not in your target range, take another 30 grams of carbohydrates. Check your blood sugar again in 15 minutes. Keep doing this until you reach your target. You can then go back to your regular testing schedule. If your symptoms or blood sugar levels are getting worse or have not improved after 15 minutes, seek medical care right away.   Here are some examples of quick-sugar foods with 15 grams of carbohydrate:  · 3 or 4 glucose tablets  · 1 tube of glucose gel  · Hard candy (such as 3 Jolly Ranchers or 5 to 7 Life Savers)  · ½ cup to ¾ cup (4 to 6 ounces) of fruit juice or regular (not diet) soda  High blood sugar  If you have symptoms of high blood sugar, check your blood sugar. Your goal is to get your level back to your target range. If it's above ______ ( for example, above 250), follow these steps:  · If you missed a dose of your diabetes medicine, take it now. Take only the amount of medicine that you have been prescribed. Do not take more or less medicine. · Give yourself insulin if your doctor has prescribed it for high blood sugar. · Test for ketones, if the doctor told you to do so. If the results of the ketone test show a moderate-to-large amount of ketones, call the doctor for advice. · Wait 30 minutes after you take the extra insulin or the missed medicine. Check your blood sugar again. If your symptoms or blood sugar levels are getting worse or have not improved after taking these steps, seek medical care right away. Follow-up care is a key part of your treatment and safety. Be sure to make and go to all appointments, and call your doctor if you are having problems. It's also a good idea to know your test results and keep a list of the medicines you take. Where can you learn more? Go to http://aram-klaus.info/. Enter H629 in the search box to learn more about \"Diabetes Blood Sugar Emergencies: Your Action Plan. \"  Current as of: July 25, 2018  Content Version: 11.9  © 2737-2509 [employer]. Care instructions adapted under license by [employer] (which disclaims liability or warranty for this information). If you have questions about a medical condition or this instruction, always ask your healthcare professional. [address] any warranty or liability for your use of this information.

## 2019-01-23 ENCOUNTER — PATIENT OUTREACH (OUTPATIENT)
Dept: FAMILY MEDICINE CLINIC | Age: 34
End: 2019-01-23

## 2019-01-23 NOTE — PROGRESS NOTES
Goals Addressed This Visit's Progress  Attends follow-up appointments as directed. On track [facility] Date:  2-5-19 1/8/2019 PCP appointment scheduled for 1/22/19. Sooner appointment offered with Dr. Lizette Yadav. Patient declined and wants to follow up with Ms. Sherrie Salmeron as planned. Patient states she is to follow up with Ms. Sue Lugo at Endocrinology office on 1-9-19. Patient states this is at Dr. Li Spine office. 1/23/2019 Patient attended PCP follow up appointment on 1/22/19 Nurse Navigator to follow case.

## 2019-02-08 ENCOUNTER — PATIENT OUTREACH (OUTPATIENT)
Dept: FAMILY MEDICINE CLINIC | Age: 34
End: 2019-02-08

## 2019-02-08 NOTE — PROGRESS NOTES
Nurse Navigator spoke with Marisa Jalloh with [address] for an update re: home health services. Per Marisa Jalloh patient is still receiving home health nursing visits. Patient appears to be doing well  may be discharged from nursing  visits next week.

## 2019-02-11 ENCOUNTER — TELEPHONE (OUTPATIENT)
Dept: FAMILY MEDICINE CLINIC | Age: 34
End: 2019-02-11

## 2019-02-11 DIAGNOSIS — E11.21 TYPE 2 DIABETES WITH NEPHROPATHY (HCC): Primary | ICD-10-CM

## 2019-02-11 NOTE — TELEPHONE ENCOUNTER
Patient states she does not want to go back to [facility] to be [facility]. She is requesting to be sent to [facility]. Fax [phone].

## 2019-02-14 ENCOUNTER — PATIENT OUTREACH (OUTPATIENT)
Dept: FAMILY MEDICINE CLINIC | Age: 34
End: 2019-02-14

## 2019-02-14 NOTE — PROGRESS NOTES
Nurse Navigator spoke with Marifer Dougherty with [address]. Per Marifer Dougherty with home health patient is open  to services and receiving home health and Physical Therapy services. Nurse Navigator called [facility]. Nurse Navigator was told that the referral had not been received as of yet. Referral was ordered on 2-11-19. As referral may be in process nurse navigator will follow up at a later date re: status of   referral to [facility].

## 2019-02-19 NOTE — TELEPHONE ENCOUNTER
Patient called to check on what she should do regarding the recall of her losartan 100mg. She can be reached at  [phone]. Color consistent with ethnicity/race, warm, dry intact, resilient.

## 2019-02-27 ENCOUNTER — OFFICE VISIT (OUTPATIENT)
Dept: FAMILY MEDICINE CLINIC | Age: 34
End: 2019-02-27

## 2019-02-27 VITALS
WEIGHT: 173.6 LBS | OXYGEN SATURATION: 97 % | RESPIRATION RATE: 16 BRPM | TEMPERATURE: 98.3 F | BODY MASS INDEX: 31.94 KG/M2 | HEIGHT: 62 IN | SYSTOLIC BLOOD PRESSURE: 140 MMHG | HEART RATE: 96 BPM | DIASTOLIC BLOOD PRESSURE: 72 MMHG

## 2019-02-27 DIAGNOSIS — I10 ESSENTIAL HYPERTENSION: Primary | ICD-10-CM

## 2019-02-27 DIAGNOSIS — E78.00 PURE HYPERCHOLESTEROLEMIA: ICD-10-CM

## 2019-02-27 RX ORDER — FLUCONAZOLE 150 MG/1
150 TABLET ORAL
Qty: 2 TAB | Refills: 0 | Status: SHIPPED | OUTPATIENT
Start: 2019-02-27 | End: 2019-03-07

## 2019-02-27 RX ORDER — BUTALBITAL, ACETAMINOPHEN AND CAFFEINE 50; 325; 40 MG/1; MG/1; MG/1
1 TABLET ORAL
Qty: 30 TAB | Refills: 0 | Status: SHIPPED | OUTPATIENT
Start: 2019-02-27 | End: 2019-03-08 | Stop reason: ALTCHOICE

## 2019-02-27 NOTE — PROGRESS NOTES
Pt is here for F/U HTN    1. Have you been to the ER, urgent care clinic since your last visit? Hospitalized since your last visit? No    2. Have you seen or consulted any other health care providers outside of the [address] [place] [address] since your last visit? Include any pap smears or colon screening.  No

## 2019-02-27 NOTE — PATIENT INSTRUCTIONS
Learning About Dietary Guidelines  What are the Dietary Guidelines for Americans? Dietary Guidelines for Americans provide tips for eating well and staying healthy. This helps reduce the risk for long-term (chronic) diseases. These adult guidelines from the [place] recommend that you:  · Eat lots of fruits, vegetables, whole grains, and low-fat or nonfat dairy products. · Try to balance your eating with your activity. This helps you stay at a healthy weight. · Drink alcohol in moderation, if at all. · Limit foods high in salt, saturated fat, trans fat, and added sugar. What is MyPlate? MyPlate is the U.S[place] government's food guide. It can help you make your own well-balanced eating plan. A balanced eating plan means that you eat enough, but not too much, and that your food gives you the nutrients you need to stay healthy. MyPlate focuses on eating plenty of whole grains, fruits, and vegetables, and on limiting fat and sugar. It is available online at www. ChooseMyPlate.gov. How can you get started? MyPlate suggests that most adults eat certain amounts from the different food groups:  Grains  Eat 5 to 8 ounces of grains each day. Half of those should be whole grains. Choose whole-grain breads, cold and cooked cereals and grains, pasta (without creamy sauces), hard rolls, or low-fat or fat-free crackers. Vegetables  Eat 2 to 3 cups of vegetables every day. They contain little if any fat. And they have lots of nutrients that help protect against heart disease. Fruits  Eat 1½ to 2 cups of fruits every day. Fruits contain very little fat but lots of nutrients. Protein foods  Most adults need 5 to 6½ ounces each day. Choose fish and lean poultry more often. Eat red meat and fried meats less often. Dried beans, tofu, and nuts are also good sources of protein. Dairy  Most adults need 3 cups of milk and milk products a day. Choose low-fat or fat-free products from this food group.  If you have problems digesting milk, try eating cheese or yogurt instead. Limit fats and oils, including those used in cooking. When you do use fats, choose oils that are liquid at room temperature (unsaturated fats). These include canola oil and olive oil. Avoid foods with trans fats, such as many fried foods, cookies, and snack foods. Where can you learn more? Go to http://aram-klaus.info/. Enter J891 in the search box to learn more about \"Learning About Dietary Guidelines. \"  Current as of: March 28, 2018  Content Version: 11.9  © 9972-3423 [employer] ([employer] Care instructions adapted under license by [employer] (which disclaims liability or warranty for this information). If you have questions about a medical condition or this instruction, always ask your healthcare professional. [address] any warranty or liability for your use of this information.

## 2019-02-27 NOTE — PROGRESS NOTES
Subjective:   Nadira Turner is a 26 y.o. female with hypertension presents for 6 weeks follow up. Patient Active Problem List   Diagnosis Code    Neuropathy G62.9    Diabetes mellitus ([facility] E11.9    Eye problems H57.9    Toe pain M79.676    Hyperlipidemia E78.5    Diabetic gastroparesis associated with type 2 diabetes mellitus ([facility] [address] E11.43, K31.84    Hypovitaminosis D E55.9    Paroxysmal atrial fibrillation (HCC) I48.0    Chronic anticoagulation Z79.01    Transient cerebral ischemia G45.9    Renal insufficiency N28.9    Bad odor of urine R82.90    Type 2 diabetes with nephropathy (HCC) E11.21    Encephalopathy acute G93.40     Current Outpatient Medications   Medication Sig Dispense Refill    metroNIDAZOLE (METROGEL) 0.75 % topical gel Apply  to affected area two (2) times a day. 60 g 0    irbesartan (AVAPRO) 150 mg tablet Take 1 Tab by mouth daily. For blood pressure 90 Tab 0    amLODIPine (NORVASC) 5 mg tablet Take 5 mg by mouth daily.  insulin pump (PATIENT SUPPLIED) misc by SubCUTAneous route as needed.  pregabalin (LYRICA) 75 mg capsule Take 1 Cap by mouth three (3) times daily. 90 Cap 3    sodium bicarbonate 650 mg tablet take 1 tablet by mouth 2 times a day  2    propranolol LA (INDERAL LA) 120 mg SR capsule Take 1 Cap by mouth daily. Every morning 90 Cap 3    simvastatin (ZOCOR) 40 mg tablet TAKE 1 TABLET BY MOUTH NIGHTLY 30 Tab 6    butalbital-acetaminophen-caffeine (FIORICET, ESGIC) -40 mg per tablet Take 1 Tab by mouth every six (6) hours as needed for Pain. 30 Tab 0    rivaroxaban (XARELTO) 15 mg tab tablet Take 1 Tab by mouth daily. 30 Tab 11    furosemide (LASIX) 20 mg tablet Take as needed daily for leg swelling. (Patient taking differently: Take 20 mg by mouth as needed. Take as needed daily for leg swelling.) 30 Tab 3    insulin lispro (HUMALOG) 100 unit/mL injection Given via pump. Pump set every 90 days in provider's office.       Blood-Glucose Meter monitoring kit by Does Not Apply route two (2) times a day. 1 Kit 0    glucose blood VI test strips (ONE TOUCH ULTRA TEST) strip by Does Not Apply route. One touch ultra mini test strips 1 Package 11      Allergies   Allergen Reactions    Latex Hives    Contrast Agent [Iodine] Other (comments)     \"Burning with shooting pain\"    Macrobid [Nitrofurantoin Monohyd/M-Cryst] Diarrhea    Novolog Mix 70-30 [Insulin Asp Prt-Insulin Aspart] Nausea and Vomiting     Past Surgical History:   Procedure Laterality Date    HX ORTHOPAEDIC  January 2013    right toe nail surgery Dr. Mya Mcghee     Family History   Problem Relation Age of Onset    Diabetes Mother     Cancer Paternal Grandmother       Lab Results   Component Value Date/Time    Cholesterol, total 288 (H) 02/09/2017 10:00 AM    Cholesterol (POC) 267 01/25/2013 12:05 PM    HDL Cholesterol 55 02/09/2017 10:00 AM    HDL Cholesterol (POC) 37 01/25/2013 12:05 PM    LDL Cholesterol (POC) 185 01/25/2013 12:05 PM    LDL, calculated 189.6 (H) 02/09/2017 10:00 AM    VLDL, calculated 43.4 02/09/2017 10:00 AM    Triglyceride 217 (H) 02/09/2017 10:00 AM    Triglycerides (POC) 225 01/25/2013 12:05 PM    CHOL/HDL Ratio 5.2 (H) 02/09/2017 10:00 AM     Lab Results   Component Value Date/Time    Sodium 145 01/03/2019 09:42 AM    Potassium 4.3 01/03/2019 09:42 AM    Chloride 108 (H) 01/03/2019 09:42 AM    CO2 22 01/03/2019 09:20 AM    CO2, TOTAL 22 01/03/2019 09:42 AM    Anion gap 7 01/03/2019 09:20 AM    Glucose 62 (L) 01/03/2019 09:42 AM    BUN 32 (H) 01/03/2019 09:42 AM    Creatinine 2.3 (H) 01/03/2019 09:42 AM    BUN/Creatinine ratio 17 02/09/2017 10:00 AM    GFR est AA 33.0 01/03/2019 09:20 AM    GFR est non-AA 27 01/03/2019 09:20 AM    Calcium 9.3 01/03/2019 09:20 AM    Bilirubin, total 0.2 01/03/2019 09:20 AM    AST (SGOT) 45 (H) 01/03/2019 09:20 AM    Alk.  phosphatase 88 01/03/2019 09:20 AM    Protein, total 8.3 (H) 01/03/2019 09:20 AM    Albumin 3.1 (L) 01/03/2019 09:20 AM A-G Ratio 1.2 09/24/2013 11:32 AM    ALT (SGPT) 24 01/03/2019 09:20 AM     Lab Results   Component Value Date/Time    WBC 5.1 01/03/2019 09:20 AM    HGB 12.6 (L) 01/03/2019 09:42 AM    HCT 37 (L) 01/03/2019 09:42 AM    PLATELET 303 97/07/4902 09:20 AM    MCV 89.0 01/03/2019 09:20 AM     Wt Readings from Last 3 Encounters:   02/27/19 173 lb 9.6 oz (78.7 kg)   01/22/19 177 lb 9.6 oz (80.6 kg)   01/05/19 176 lb 5.9 oz (80 kg)     Last Point of Care HGB A1C  Hemoglobin A1c (POC)   Date Value Ref Range Status   05/04/2018 8.5 % Final      BP Readings from Last 3 Encounters:   02/27/19 (!) 157/91   01/22/19 (!) 162/97   01/05/19 (!) 166/94       Hypertension ROS: taking medications as instructed, no medication side effects noted, no TIA's, no chest pain on exertion, no dyspnea on exertion, no swelling of ankles. New concerns: patient states has increased personal stressors last night. Reports she did not begin taking avapro. Reports she wanted to wait until she finished her current bottle of losartan. Patient reports nephrologist called her to schedule a follow up appt. However, she states she doesn't see why she has to continue see them. Objective:   Visit Vitals  /72 (BP 1 Location: Right arm, BP Patient Position: Sitting)   Pulse 96   Temp 98.3 °F (36.8 °C) (Oral)   Resp 16   Ht 5' 2\" (1.575 m)   Wt 173 lb 9.6 oz (78.7 kg)   LMP 02/27/2019 (Exact Date)   SpO2 97%   BMI 31.75 kg/m²      General appearance - alert, well appearing, and in no distress  Neck - supple, no significant adenopathy, carotids upstroke normal bilaterally, no bruits  Chest - clear to auscultation, no wheezes, rales or rhonchi, symmetric air entry  Heart - normal rate, regular rhythm, normal S1, S2, no murmurs, rubs, clicks or gallops  Extremities - peripheral pulses normal, no pedal edema, no clubbing or cyanosis          Assessment/Plan:      ICD-10-CM ICD-9-CM    1. Essential hypertension I10 401.9    2.  Pure hypercholesterolemia E78.00 272.0    encouraged to return to nephrologist.  Informed of importance of following up. Keep scheduled appt with cardiology  I have discussed the diagnosis with the patient and the intended plan as seen in the above orders. The patient has received an after-visit summary and questions were answered concerning future plans. I have discussed medication side effects and warnings with the patient as well. Patient agreeable with above plan and verbalizes understanding. Follow-up Disposition:  Return in about 3 months (around 5/27/2019) for HTN.

## 2019-03-04 ENCOUNTER — PATIENT OUTREACH (OUTPATIENT)
Dept: FAMILY MEDICINE CLINIC | Age: 34
End: 2019-03-04

## 2019-03-04 NOTE — PROGRESS NOTES
Goals Addressed This Visit's Progress  COMPLETED: Attends follow-up appointments as directed. On track [employer] Date:  2-5-19 1/8/2019 PCP appointment scheduled for 1/22/19. Sooner appointment offered with Dr. Jg Chang. Patient declined and wants to follow up with Ms. Hailey Garcia as planned. Patient states she is to follow up with Ms. Gbison Uribe at Endocrinology office on 1-9-19. Patient states this is at Dr. Margi Le office. 1/23/2019 Patient attended PCP follow up appointment on 1/22/19 
 
3/4/2019 Patient attended follow up appointment with PCP on 2-27-19  COMPLETED: Prevent complications post hospitalization. Target Date:  2/5/19 1/8/2019 Patient made aware that she can contact PCP office 24 hours a day to include evenings, weekends,and holidays. Please call the office phone number for assistance.  COMPLETED: Understands red flags post discharge. Target Date:  2/5/19 1/8/2019 Red flags reviewed with patient as follows:  
- Dizziness 
- Shakiness - Sweating  
- Confusion 
- feeling weak Patient asked to follow up with PCP or ED as needed for red flag symtoms

## 2019-03-08 ENCOUNTER — PATIENT OUTREACH (OUTPATIENT)
Dept: FAMILY MEDICINE CLINIC | Age: 34
End: 2019-03-08

## 2019-03-08 ENCOUNTER — OFFICE VISIT (OUTPATIENT)
Dept: CARDIOLOGY CLINIC | Age: 34
End: 2019-03-08

## 2019-03-08 ENCOUNTER — TELEPHONE (OUTPATIENT)
Dept: CARDIOLOGY CLINIC | Age: 34
End: 2019-03-08

## 2019-03-08 VITALS
BODY MASS INDEX: 33.29 KG/M2 | SYSTOLIC BLOOD PRESSURE: 142 MMHG | OXYGEN SATURATION: 97 % | DIASTOLIC BLOOD PRESSURE: 100 MMHG | WEIGHT: 182 LBS | HEART RATE: 84 BPM

## 2019-03-08 DIAGNOSIS — R53.83 FATIGUE, UNSPECIFIED TYPE: ICD-10-CM

## 2019-03-08 DIAGNOSIS — I48.0 PAROXYSMAL ATRIAL FIBRILLATION (HCC): Primary | ICD-10-CM

## 2019-03-08 RX ORDER — METOPROLOL TARTRATE 50 MG/1
50 TABLET ORAL 2 TIMES DAILY
Qty: 60 TAB | Refills: 6 | Status: SHIPPED | OUTPATIENT
Start: 2019-03-08 | End: 2019-03-08 | Stop reason: SDUPTHER

## 2019-03-08 RX ORDER — METOPROLOL TARTRATE 50 MG/1
TABLET ORAL
Qty: 180 TAB | Refills: 6 | Status: SHIPPED | OUTPATIENT
Start: 2019-03-08 | End: 2020-06-01

## 2019-03-08 NOTE — TELEPHONE ENCOUNTER
Called and requested appointment at the Sleep Study Office. They will be calling patient to schedule appointment in June 2019. When schedule opens up. Patient is aware and will call us back to schedule her follow up appointment after seen at the Sleep Study Office.

## 2019-03-08 NOTE — PROGRESS NOTES
Nurse Navigator called [facility] to follow up with  referral for patient. Nurse Navigator was told that a referral has not been received. Nurse Navigator spoke with the referral coordinator with [facility]. Referral Coordinator will follow up with [facility] and/or resend referral.     Nurse Navigator spoke with [address] [place] [address] Fortunato Peng staff member. Nurse Navigator advised that home health care services have been discontinued as of 2-14-19. Patient attended PCP follow up appointment on 2/27/19. Case chanaged to Complex Case Management and Nurse Navigator will continue to monitor case for referral status and any other needs as identified.

## 2019-03-08 NOTE — PROGRESS NOTES
Corina Main    Chief Complaint   Patient presents with    New Patient     previous patient of Dr Goldstein Jose Francisco Palpitations     fluttering once a month without exertion     Shortness of Breath     accompanying palpitations     Leg Swelling     both ankles on/off        HPI    Corina Main is a 26 y.o. young -American female with CKD, DM, here for follow-up of pAF and HTN, c/o SOB and palpitations. As you know patient used to follow with my late partner for nonvalvular paroxsymal atrial fibrillation that was diagnosed by event monitor ~2017. Despite being so young, she has a high enough CHADsVASC score (DM, HTN) and apparent history of recurrent TIA that he initiated renally dosed systemic anticoagulation for stroke prophylaxis. She follows with a kidney doctor for CKD. At [facility] after being found down by her son. He said this was found to be due to her diabetes and she had severe hypoglycemia. During her hospitalization she had an echocardiogram 1/4/19 that noted normal LV function at 66%, significant valvular pathology and no effusions. Mitral valve leaflets was noted to be a bit sclerotic but it did not affect its mobility or function. Personally obtained and reviewed these records with patient during our encounter today. As you know she presents today for routine follow-up of her atrial fibrillation. Sounds like she does have paroxysms of this and is mostly asymptomatic but does notice that particular when she is laying down at night to go to sleep. Noticed episodes of her heart fluttering or skipping beats and at times it can raise. She can have some shortness of breath with the palpitations but this is not often. Otherwise denies chest discomfort. She does complain of swelling in her ankles off and on has had no significant change in her weight. During a very thorough review of systems patient does feel chronically tired midst of snoring.   Even after she sleeps, she does not feel rested. Cardiac RFs: DM (dx age 21, type 1 vs type 2?), HTN, HL, TIAs? Past Medical History:   Diagnosis Date    Cardiac echocardiogram 09/19/2016    [facility]:  Tech difficult. Pt refused 2nd attempt at bubble study. EF 60%. No WMA. Mild conc LVH. RVSP 20 mmHg. No atrial shunting by Doppler.  Cardiovascular lower extremity venous duplex 06/20/2016    No DVT bilaterally.  Carotid duplex 09/19/2016    Mild < 50% bilateral ICA stenosis.  Cerebral artery occlusion with cerebral infarction ([address]     Diabetes ([address]     Diabetic neuropathy ([address]     Diabetic retinopathy     Hypercholesterolemia     Hypertension        Past Surgical History:   Procedure Laterality Date    HX ORTHOPAEDIC  January 2013    right toe nail surgery Dr. Christine Harrison       Current Outpatient Medications   Medication Sig Dispense Refill    metroNIDAZOLE (METROGEL) 0.75 % topical gel Apply  to affected area two (2) times a day. 60 g 0    irbesartan (AVAPRO) 150 mg tablet Take 1 Tab by mouth daily. For blood pressure 90 Tab 0    amLODIPine (NORVASC) 5 mg tablet Take 5 mg by mouth daily.  insulin pump (PATIENT SUPPLIED) misc by SubCUTAneous route as needed.  pregabalin (LYRICA) 75 mg capsule Take 1 Cap by mouth three (3) times daily. 90 Cap 3    sodium bicarbonate 650 mg tablet take 1 tablet by mouth 2 times a day  2    simvastatin (ZOCOR) 40 mg tablet TAKE 1 TABLET BY MOUTH NIGHTLY 30 Tab 6    rivaroxaban (XARELTO) 15 mg tab tablet Take 1 Tab by mouth daily. 30 Tab 11    furosemide (LASIX) 20 mg tablet Take as needed daily for leg swelling. (Patient taking differently: Take 20 mg by mouth as needed. Take as needed daily for leg swelling.) 30 Tab 3    insulin lispro (HUMALOG) 100 unit/mL injection Given via pump. Pump set every 90 days in provider's office.  Blood-Glucose Meter monitoring kit by Does Not Apply route two (2) times a day.  1 Kit 0    glucose blood VI test strips (ONE TOUCH ULTRA TEST) strip by Does Not Apply route. One touch ultra mini test strips 1 Package 11       Allergies   Allergen Reactions    Latex Hives    Contrast Agent [Iodine] Other (comments)     \"Burning with shooting pain\"    Macrobid [Nitrofurantoin Monohyd/M-Cryst] Diarrhea    Novolog Mix 70-30 [Insulin Asp Prt-Insulin Aspart] Nausea and Vomiting       Social History     Socioeconomic History    Marital status: SINGLE     Spouse name: Not on file    Number of children: Not on file    Years of education: Not on file    Highest education level: Not on file   Social Needs    Financial resource strain: Not on file    Food insecurity - worry: Not on file    Food insecurity - inability: Not on file   [employer] needs - medical: Not on file   [employer] needs - non-medical: Not on file   Occupational History    Not on file   Tobacco Use    Smoking status: Never Smoker    Smokeless tobacco: Never Used   Substance and Sexual Activity    Alcohol use: No    Drug use: No    Sexual activity: No   Other Topics Concern    Not on file   Social History Narrative    Not on file        FH negative for premature CAD and SCD. Review of Systems    14 pt Review of Systems is negative unless otherwise mentioned in the HPI. Wt Readings from Last 3 Encounters:   03/08/19 82.6 kg (182 lb)   02/27/19 78.7 kg (173 lb 9.6 oz)   01/22/19 80.6 kg (177 lb 9.6 oz)     Temp Readings from Last 3 Encounters:   02/27/19 98.3 °F (36.8 °C) (Oral)   01/22/19 98.1 °F (36.7 °C) (Oral)   01/05/19 98.7 °F (37.1 °C)     BP Readings from Last 3 Encounters:   03/08/19 (!) 142/100   02/27/19 140/72   01/22/19 (!) 162/97     Pulse Readings from Last 3 Encounters:   03/08/19 84   02/27/19 96   01/22/19 90     Physical Exam:    Visit Vitals  BP (!) 142/100   Pulse 84   Wt 82.6 kg (182 lb)   LMP 02/27/2019 (Exact Date)   SpO2 97%   BMI 33.29 kg/m²      Physical Exam   Constitutional: She is oriented to person, place, and time. She appears well-developed and well-nourished. HENT:   Head: Normocephalic and atraumatic. Eyes: EOM are normal. Pupils are equal, round, and reactive to light. Neck: No JVD present. Cardiovascular: Normal rate, regular rhythm, normal heart sounds and intact distal pulses. Exam reveals no gallop and no friction rub. No murmur heard. Pulmonary/Chest: Effort normal and breath sounds normal. No respiratory distress. She has no wheezes. She has no rales. She exhibits no tenderness. Abdominal: Soft. Bowel sounds are normal.   Musculoskeletal: She exhibits no edema or tenderness. Neurological: She is alert and oriented to person, place, and time. Skin: Skin is warm and dry. Psychiatric: She has a normal mood and affect.        EKG today shows: NSR, normal axis and intervals, no ST segment abnormalities    Lab Results   Component Value Date/Time    TSH 2.38 02/09/2017 10:00 AM     Lab Results   Component Value Date/Time    Cholesterol, total 288 (H) 02/09/2017 10:00 AM    Cholesterol (POC) 267 01/25/2013 12:05 PM    HDL Cholesterol 55 02/09/2017 10:00 AM    HDL Cholesterol (POC) 37 01/25/2013 12:05 PM    LDL Cholesterol (POC) 185 01/25/2013 12:05 PM    LDL, calculated 189.6 (H) 02/09/2017 10:00 AM    VLDL, calculated 43.4 02/09/2017 10:00 AM    Triglyceride 217 (H) 02/09/2017 10:00 AM    Triglycerides (POC) 225 01/25/2013 12:05 PM    CHOL/HDL Ratio 5.2 (H) 02/09/2017 10:00 AM     Lab Results   Component Value Date/Time    Sodium 145 01/03/2019 09:42 AM    Potassium 4.3 01/03/2019 09:42 AM    Chloride 108 (H) 01/03/2019 09:42 AM    CO2 22 01/03/2019 09:20 AM    CO2, TOTAL 22 01/03/2019 09:42 AM    Anion gap 7 01/03/2019 09:20 AM    Glucose 62 (L) 01/03/2019 09:42 AM    BUN 32 (H) 01/03/2019 09:42 AM    Creatinine 2.3 (H) 01/03/2019 09:42 AM    BUN/Creatinine ratio 17 02/09/2017 10:00 AM    GFR est AA 33.0 01/03/2019 09:20 AM    GFR est non-AA 27 01/03/2019 09:20 AM    Calcium 9.3 01/03/2019 09:20 AM Bilirubin, total 0.2 01/03/2019 09:20 AM    AST (SGOT) 45 (H) 01/03/2019 09:20 AM    Alk. phosphatase 88 01/03/2019 09:20 AM    Protein, total 8.3 (H) 01/03/2019 09:20 AM    Albumin 3.1 (L) 01/03/2019 09:20 AM    A-G Ratio 1.2 09/24/2013 11:32 AM    ALT (SGPT) 24 01/03/2019 09:20 AM       Impression and Plan:  Marge Bowen is a 26 y.o. with:    1.) nonvalvular pAF, currently NSR, ChadsVasc ~5  2.) diabetes, Type 1?  3.) HTN, avg SBP goal <140s, hovering above goal  4.) Signs and symptoms suggestive of sleep disordered breathing  5.) Hyperlipidemia, LDL almost >190!  6.) H/o TIAs/ CVA?  7.) CKD, known  8.) Normal LV function/ no structural heart disease based on echo 1/2019  9.) Obesity based on BMI 33    1.) Requested records from endocrine regarding diabetes hx  2.) Refer for sleep study  3.) Stop Propanolol  4.) Rate control strategy with metoprolol 50 BID for now, can titrate dose based on her response  5.) Continue Xarelto 15 daily for stroke ppx based on high chadsVasc  6.) RTC 1-2 months recheck pAF, HTN, can consider holter to see if rates well controlled, also change her to high intensity statin    I spent significant time getting to know patient today and her very complex medical history. I did not want to make too many changes at once so focused first on her symptoms. I am worried that her rates are not well controlled. The liberty of changing her beta-blocker to metoprolol to hopefully achieve better rate control. He is such a young person with a structurally normal heart so I am concern for some underlying process that is potentially triggering her atrial fibrillation. Her thyroid function seems to be normal does have signs and symptoms to suggest obstructive sleep apnea. Had and referred her to a sleep study because if this is the case and often times reduced the proximal-isms of atrial fibrillation and help achieve better rate control.     Getting to know her list on my available medical records it looks like her encephalopathy more metabolic in nature and there is some question of possible new onset seizure. And she is undergoing a workup regarding this. It sounds to me like she is a type I diabetic I did not have all available records to confirm this. She would benefit from a high intensity statin likely initiate this at our next visit. Chronic kidney disease labile blood pressures so we will have to keep a close eye on her. Some small adjustments today and asked her to come back for close follow-up so I can make further recommendations. I did ask her to call me sooner has any questions or does not tolerate her new medication. Thank you for allowing me to participate in the care of your patient, please do not hesitate to call with questions or concerns. Follow-up Disposition:  Return in about 2 months (around 5/8/2019).     Ibis Streeter, DO

## 2019-03-08 NOTE — PATIENT INSTRUCTIONS
Sleep study referral given- preferably please complete sleep study prior to your follow up appointment  Stop inderal   Start metoprolol 50mg twice daily  Follow up in 2 months    Metoprolol (By mouth)   Metoprolol (met-oh-PROE-lol)  Treats high blood pressure, angina (chest pain), and heart failure. May lower the risk of death after a heart attack. This medicine is a beta-blocker. Brand Name(s): Lopressor, Toprol XL   There may be other brand names for this medicine. When This Medicine Should Not Be Used: This medicine is not right for everyone. Do not use if you had an allergic reaction to metoprolol or similar medicines. Do not use this medicine if you have certain blood circulation or heart problems. Ask your doctor about these problems. How to Use This Medicine:   Tablet, Long Acting Tablet  · Take your medicine as directed. Your dose may need to be changed several times to find what works best for you. · Take this medicine with a meal or right after a meal. Take this medicine the same way every day, at the same time. · Swallow the tablet whole with a glass of water. You may break the extended-release tablet in half, but do not chew or crush it. · Missed dose: Take a dose as soon as you remember. If it is almost time for your next dose, wait until then and take a regular dose. Do not take extra medicine to make up for a missed dose. · Store the medicine in a closed container at room temperature, away from heat, moisture, and direct light. Drugs and Foods to Avoid:   Ask your doctor or pharmacist before using any other medicine, including over-the-counter medicines, vitamins, and herbal products. · Some medicines can affect how metoprolol works.  Tell your doctor if you are taking any of the following:   ¨ Digoxin, dipyridamole, hydralazine, hydroxychloroquine, methyldopa, quinidine  ¨ Medicine to treat depression (such as bupropion, clomipramine, desipramine, fluoxetine, fluvoxamine, paroxetine, sertraline), medicine to treat mental illness (such as chlorpromazine, fluphenazine, haloperidol, thioridazine), medicine for heart rhythm problems (such as propafenone), HIV/AIDS medicine (such as ritonavir), medicine to treat a fungus infection (such as terbinafine), a monoamine oxidase inhibitor (MAOI), an ergot medicine for headaches, a calcium channel blocker (such as amlodipine, diltiazem, verapamil), or an alpha blocker (such as clonidine, prazosin, reserpine, guanethidine)  Warnings While Using This Medicine:   · Tell your doctor if you are pregnant or breastfeeding, or if you have blood vessel, heart, or circulation problems (such as heart failure, rhythm problems, or slow heartbeat). Tell your doctor if you have kidney disease, liver disease, diabetes, lung disease (such as asthma), an overactive thyroid, or a history of allergies. · This medicine may cause worse symptoms of heart failure while the dose is being adjusted. · Do not stop using this medicine suddenly. Your doctor will need to slowly decrease your dose before you stop it completely. · Tell any doctor or dentist who treats you that you are using this medicine. You may need to stop using this medicine several days before you have surgery or medical tests. · This medicine could lower your blood pressure too much, especially when you first use it or if you are dehydrated. Stand or sit up slowly if you feel lightheaded or dizzy. · This medicine may make you dizzy or drowsy. Do not drive or do anything else that could be dangerous until you know how this medicine affects you. · Your doctor will check your progress and the effects of this medicine at regular visits. Keep all appointments. · Keep all medicine out of the reach of children. Never share your medicine with anyone.   Possible Side Effects While Using This Medicine:   Call your doctor right away if you notice any of these side effects:  · Allergic reaction: Itching or hives, swelling in your face or hands, swelling or tingling in your mouth or throat, chest tightness, trouble breathing  · Lightheadedness, dizziness, or fainting  · Slow heartbeat  · Swelling in your hands, ankles, or feet, trouble breathing, tiredness  · Worsening chest pain  If you notice these less serious side effects, talk with your doctor:   · Diarrhea  · Mild dizziness or tiredness  If you notice other side effects that you think are caused by this medicine, tell your doctor. Call your doctor for medical advice about side effects. You may report side effects to FDA at 6-766-GCX-9676  © 2017 [facility] Information is for End User's use only and may not be sold, redistributed or otherwise used for commercial purposes. The above information is an  only. It is not intended as medical advice for individual conditions or treatments. Talk to your doctor, nurse or pharmacist before following any medical regimen to see if it is safe and effective for you.

## 2019-03-08 NOTE — PROGRESS NOTES
Mariama Puentes presents today for   Chief Complaint   Patient presents with    New Patient     previous patient of Dr Serna Si Palpitations     fluttering once a month without exertion     Shortness of Breath     accompanying palpitations     Leg Swelling     both ankles on/off        Mariama Puentes preferred language for health care discussion is english/other. Is someone accompanying this pt? No     Is the patient using any DME equipment during OV? No     Depression Screening:  3 most recent PHQ Screens 8/14/2018   Little interest or pleasure in doing things Not at all   Feeling down, depressed, irritable, or hopeless Not at all   Total Score PHQ 2 0       Learning Assessment:  Learning Assessment 3/13/2015   PRIMARY LEARNER Patient   HIGHEST LEVEL OF EDUCATION - PRIMARY LEARNER  2 YEARS [facility] PRIMARY LEARNER NONE   CO-LEARNER CAREGIVER No   PRIMARY LANGUAGE ENGLISH   LEARNER PREFERENCE PRIMARY DEMONSTRATION     -   ANSWERED BY patient   RELATIONSHIP SELF       Abuse Screening:  Abuse Screening Questionnaire 5/13/2015   Do you ever feel afraid of your partner? N   Are you in a relationship with someone who physically or mentally threatens you? N   Is it safe for you to go home? Y       Fall Risk  No flowsheet data found. Pt currently taking Antiplatelet therapy? Xarelto     Coordination of Care:  1. Have you been to the ER, urgent care clinic since your last visit? Hospitalized since your last visit? No     2. Have you seen or consulted any other health care providers outside of the [address] [place] [address] since your last visit? Include any pap smears or colon screening.  NO

## 2019-03-11 ENCOUNTER — PATIENT OUTREACH (OUTPATIENT)
Dept: FAMILY MEDICINE CLINIC | Age: 34
End: 2019-03-11

## 2019-03-11 NOTE — PROGRESS NOTES
Telephone call received from referral coordinator with [facility]. Nurse Navigator was told that per philomena's request referral was sent to [facility] office in [place], [place]. The office staff have reached out to patient for follow up. Referral Coordinator's assistance is very much appreciated.

## 2019-03-12 ENCOUNTER — PATIENT OUTREACH (OUTPATIENT)
Dept: FAMILY MEDICINE CLINIC | Age: 34
End: 2019-03-12

## 2019-03-12 NOTE — PROGRESS NOTES
Patient has graduated from the Complex Case Management  program on 3-12-19. Patient's symptoms are stable at this time. Patient/family has the ability to self-manage. Care management goals have been completed at this time. No further nurse navigator follow up scheduled.     Goals Addressed     None        Patients upcoming visits:    Future Appointments   Date Time Provider Brian Clifford   5/13/2019 10:40 AM Freddy Ledbetter NP [address] [facility]

## 2019-04-23 DIAGNOSIS — I10 ESSENTIAL HYPERTENSION: ICD-10-CM

## 2019-04-24 RX ORDER — IRBESARTAN 150 MG/1
TABLET ORAL
Qty: 90 TAB | Refills: 3 | Status: SHIPPED | OUTPATIENT
Start: 2019-04-24 | End: 2020-05-17

## 2019-04-29 RX ORDER — RIVAROXABAN 15 MG/1
TABLET, FILM COATED ORAL
Qty: 30 TAB | Refills: 6 | Status: SHIPPED | OUTPATIENT
Start: 2019-04-29 | End: 2020-03-23

## 2019-06-25 ENCOUNTER — TELEPHONE (OUTPATIENT)
Dept: FAMILY MEDICINE CLINIC | Age: 34
End: 2019-06-25

## 2019-06-25 NOTE — TELEPHONE ENCOUNTER
Placed call to patient to see if she has been scheduled with [facility] per 3/11/19 note and if not she needs to schedule a DM follow up with Gilford Horseman. Detailed message left requesting the patient return call to the office.

## 2019-06-26 ENCOUNTER — OFFICE VISIT (OUTPATIENT)
Dept: PULMONOLOGY | Age: 34
End: 2019-06-26

## 2019-06-26 VITALS
HEIGHT: 62 IN | BODY MASS INDEX: 31.8 KG/M2 | WEIGHT: 172.8 LBS | SYSTOLIC BLOOD PRESSURE: 156 MMHG | TEMPERATURE: 98.6 F | OXYGEN SATURATION: 99 % | HEART RATE: 98 BPM | DIASTOLIC BLOOD PRESSURE: 88 MMHG | RESPIRATION RATE: 16 BRPM

## 2019-06-26 DIAGNOSIS — E66.9 OBESITY (BMI 30-39.9): ICD-10-CM

## 2019-06-26 DIAGNOSIS — F40.01 AGORAPHOBIA WITH PANIC ATTACKS: ICD-10-CM

## 2019-06-26 DIAGNOSIS — I48.0 PAROXYSMAL ATRIAL FIBRILLATION (HCC): Primary | ICD-10-CM

## 2019-06-26 DIAGNOSIS — E04.1 THYROID NODULE: ICD-10-CM

## 2019-06-26 DIAGNOSIS — Z86.73 HISTORY OF TIA (TRANSIENT ISCHEMIC ATTACK): ICD-10-CM

## 2019-06-26 DIAGNOSIS — K31.84 DIABETIC GASTROPARESIS ASSOCIATED WITH TYPE 2 DIABETES MELLITUS (HCC): ICD-10-CM

## 2019-06-26 DIAGNOSIS — E11.43 DIABETIC GASTROPARESIS ASSOCIATED WITH TYPE 2 DIABETES MELLITUS (HCC): ICD-10-CM

## 2019-06-26 DIAGNOSIS — R06.83 SNORING: ICD-10-CM

## 2019-06-26 DIAGNOSIS — N18.30 STAGE 3 CHRONIC KIDNEY DISEASE (HCC): ICD-10-CM

## 2019-06-26 DIAGNOSIS — G47.19 EXCESSIVE DAYTIME SLEEPINESS: ICD-10-CM

## 2019-06-26 DIAGNOSIS — I10 ESSENTIAL HYPERTENSION: ICD-10-CM

## 2019-06-26 DIAGNOSIS — E78.5 HYPERLIPIDEMIA, UNSPECIFIED HYPERLIPIDEMIA TYPE: ICD-10-CM

## 2019-06-26 NOTE — LETTER
6/26/19 Patient: Clement Hassan YOB: 1985 Date of Visit: 6/26/2019 Juanjose Marin NP 
[address] [facility] [address] [facility] [address] VIA In Basket Nan Perdomo [facility] [address] [address] [address] [address] VIA In Basket Dear KEHINDE Vallejo DO, Thank you for referring Ms. Barbie Waite to [facility] [place] for evaluation. My notes for this consultation are attached. If you have questions, please do not hesitate to call me. I look forward to following your patient along with you.  
 
 
Sincerely, 
 
Danish Bullock, DO

## 2019-06-26 NOTE — PROGRESS NOTES
[facility]  Pulmonary, Critical Care, and Sleep Medicine    Office Progress Note- Initial Evaluation      Primary Care Physician: Pio Garcia NP     Reason for Visit:  Evaluation for possible sleep disorder    Assessment:  1. Snoring- Patient has symptoms and exam findings highly suggestive of a sleep breathing disorder, such as SHIRLEY. Given severity of symptoms and comorbidities additional in-lab sleep testing is indicated. 2. Excessive Daytime Sleepiness (EDS)- could be due to several factors: possible SHIRLEY, sleep hygiene, diabetes, neuropathy, etc  .  3. Atrial Fibrillation-Paroxsymal (PAFIB)- On Xarelto, with report of palpitations (possible PAFIB)  That is worse when laying down and sleeping- Cardiologist: Dr. Rosalinda Montgomery    4. Thyroid- feels enlarged and with nodularity on exam  5.   6. Dyspnea- associated with palpitations    7. Hypertension- unclear what antihypertensive medications the patient is actually taking    8. Diabetes- Insulin pump. Patient is currently self managing her pump. Hypoglycemic event in Jan 2019    9. Chronic Kidney Disease- Stage 3 (CKD3)- Nephrologist: Dr. Adam Galvan  10. H/O TIA- 2016  11. Anxiety/Panic attacks- Agoraphobia: unclear if contributing to report of palpitations. Patient states she would be able to wear a PAP mask if needed. 12. Hyperlipidemia (HLD)  13. Anemia- chronic disease  14. Obesity: Body mass index is 31.61 kg/m². Plan:    · Schedule patient for Split sleep study for further evaluation. · Obtain TFTs and Thyroid US  · Patient encouraged to keep list of active medications  · Potential consequences of untreated sleep apnea, and/or excessive daytime sleepiness were discussed with the patient. · Educational materials provided. · Treatment options including CPAP, dental appliance, weight reduction measures, positional therapy, surgeries etc were discussed.   · Healthy lifestyle changes to include weight loss and exercise discussed. · Healthy sleep habits were reviewed and encouraged. ·  and workplace safety reviewed and discussed as appropriate. Drowsy and/or inattentive driving should be avoided. · Follow up with Cardiologist as directed. · Follow-up in after sleep testing, sooner should new symptoms or problems arise. · Follow up with Primary Care Provider (PCP) as directed and for, diabetes, insulin pump, BP meds and  routine health care maintenance. History of Present Illness: Ms Gloria Weinberg is a 37 y.o. female patient who has  multiple medical problems to include: diabetes, PAFIB, hypertension, chronic kidney disease. She was referred by her cardiologist due to PAFIB history with increased palpitations at night, laying down and sleeping. The history was provided by the patient and chart review. Occupation: Does not work. On disability. Driving:  yes  Drowsy Driving: is not reported. Motor vehicle accident(s) associated with drowsy driving have not occurred. Snoring: This is a Chronic problem which has been ongoing for years. Patient is not aware that she snores but she has been told by her son that she snores. Witnessed apneas are not reported. Fatigue: This is a Chronic problem which has been ongoing for years. Dental: Teeth clenching or grindingis not reported. Naps: are reported most days - late morning. Leg Symptoms/Pain: She does not have unpleasant or crawling sensation in legs or strong urge to move when inactive. She does have neuropathy and takes Lyrica. Her neuropathic leg pain occurs throughout the day and night. GERD: is reported about twice weekly. She was previously taking PRN Prilosec OTC. Hypoglycemia: Patient experienced a severe bout of hypoglycemia in January 2019. She was [facility] at Health [facility].  She states that since that episode she did not want to return to her endocrinologist and she has been managing her insulin pump on her own. Hypertension: She takes several agents and she was unclear about what she is actually taking. She does not have a list and did not bring her medications    Mood: some anxiety- especially crowds, does not like crowds, she is afraid about going to [place] (afraid of robbery), overly cautious, panics about going to new places and getting lost. Situational irritability. Sleep-Wake History:     Estimates sleeping approximately 5-6 hours per night/day. Reports sleeping in a Bed with 1 pillows. She gets into bed at approximately [id number]. Once in bed,she goes to bed. No TV. It usually takes up to 20 minutes to fall asleep after going to bed. Reports waking up to use the bathroom 2-3 times nightly. Pain, typically does disturb their sleep. - Her neuropathy. Vivid dreams are reported once weekly. She normally awakens with an alarm to start their day at 0605. She  typically gets out of bed at 0630 . Waking up with a morning headache is reported about 3-4 times weekly- especially prior to menstrual cycle. Awakening with a dry mouth is reported. Symptom(s) suggestive of cataplexy are not reported. Sleep paralysis is not  reported. Hypnagogic and/or hypnopompic hallucinations are not. Sleep walking is not  reported. Sleep talking is not  report. Other unusual and/or parasomnia behaviors are not reported. Family Sleep History:        Stew Dumont 6/26/2019   Does the patient snore loudly (louder than talking or loud enough to be heard through closed doors)? 0   Does the patient often feel tired, fatigued, or sleepy during the daytime, even after a \"good\" night's sleep? 1   Has anyone ever observed the patient stop breathing during their sleep?  0   Does the patient have or are they being treated for high blood pressure? 1   Is the patient's BMI greater than 35? 0   Is your neck circumference greater than 17 inches (Male) or 16 inches (Female)?  0   Is the patient older than 50? 0   Is the patient male? 0   SHIRLEY Score 2       3 most recent PHQ Screens 6/26/2019   Little interest or pleasure in doing things Not at all   Feeling down, depressed, irritable, or hopeless Not at all   Total Score PHQ 2 0       [place] Scale 6/26/2019   Sitting and Reading 3   Watching TV 1   Sitting, inactive in a public place (e.g. a movie theater or meeting) 0   As a passenger in a car for an hour, without a break 0   Lying down to rest in the afternoon, when circumstances permit 3   Sitting and talking to someone 0   Sitting quietly after lunch without alcohol 2   In a car, while stopped for a few minutes in traffic 0   [place] Sleepiness Score 9        Neck circ. in \"inches\": 16         Past Medical History:  Past Medical History:   Diagnosis Date    Cardiac echocardiogram 09/19/2016    [facility]:  Tech difficult. Pt refused 2nd attempt at bubble study. EF 60%. No WMA. Mild conc LVH. RVSP 20 mmHg. No atrial shunting by Doppler.  Cardiovascular lower extremity venous duplex 06/20/2016    No DVT bilaterally.  Carotid duplex 09/19/2016    Mild < 50% bilateral ICA stenosis.     Cerebral artery occlusion with cerebral infarction ([address]     Diabetes ([address]     Diabetic neuropathy ([address]     Diabetic retinopathy     Hypercholesterolemia     Hypertension        Past Surgical History:  Past Surgical History:   Procedure Laterality Date    HX ORTHOPAEDIC  January 2013    right toe nail surgery Dr. David Zimmerman       Family History:  Family History   Problem Relation Age of Onset    Diabetes Mother     Cancer Paternal Grandmother        Social History:  Social History     Tobacco Use    Smoking status: Never Smoker    Smokeless tobacco: Never Used   Substance Use Topics    Alcohol use: No    Drug use: No        Caffeine Amount Time of last Intake Comments   Coffee None     Soda None     Tea 1 glass/day  Ice Tea w/ sugar   Energy Drinks None     Over- the - counter stimulant pills None     Other Substances      Alcohol None     Tobacco None     Drugs None         Medications:  Current Outpatient Medications on File Prior to Visit   Medication Sig Dispense Refill    XARELTO 15 mg tab tablet TAKE 1 TABLET BY MOUTH DAILY 30 Tab 6    metoprolol tartrate (LOPRESSOR) 50 mg tablet TAKE 1 TABLET BY MOUTH TWICE DAILY 180 Tab 6    amLODIPine (NORVASC) 5 mg tablet Take 5 mg by mouth daily.  insulin pump (PATIENT SUPPLIED) misc by SubCUTAneous route as needed.  pregabalin (LYRICA) 75 mg capsule Take 1 Cap by mouth three (3) times daily. 90 Cap 3    sodium bicarbonate 650 mg tablet take 1 tablet by mouth 2 times a day  2    simvastatin (ZOCOR) 40 mg tablet TAKE 1 TABLET BY MOUTH NIGHTLY 30 Tab 6    insulin lispro (HUMALOG) 100 unit/mL injection Given via pump. Pump set every 90 days in provider's office.  Blood-Glucose Meter monitoring kit by Does Not Apply route two (2) times a day. 1 Kit 0    glucose blood VI test strips (ONE TOUCH ULTRA TEST) strip by Does Not Apply route. One touch ultra mini test strips 1 Package 11    irbesartan (AVAPRO) 150 mg tablet TAKE 1 TABLET BY MOUTH DAILY FOR BLOOD PRESSURE 90 Tab 3    metroNIDAZOLE (METROGEL) 0.75 % topical gel Apply  to affected area two (2) times a day. 60 g 0    furosemide (LASIX) 20 mg tablet Take as needed daily for leg swelling. (Patient taking differently: Take 20 mg by mouth as needed. Take as needed daily for leg swelling.) 30 Tab 3     No current facility-administered medications on file prior to visit.          Allergy:  Allergies   Allergen Reactions    Latex Hives    Contrast Agent [Iodine] Other (comments)     \"Burning with shooting pain\"    Macrobid [Nitrofurantoin Monohyd/M-Cryst] Diarrhea    Novolog Mix 70-30 [Insulin Asp Prt-Insulin Aspart] Nausea and Vomiting       Review of Systems  General ROS: positive for  - fatigue and sleep disturbance  negative for - chills, fever, malaise or night sweats  ENT ROS: positive for - headaches and nasal congestion  negative for - epistaxis, hearing change, nasal polyps, oral lesions, sinus pain, sneezing, sore throat, tinnitus, vertigo, visual changes or vocal changes  Hematological and Lymphatic ROS: negative for - bleeding problems, blood clots, bruising, jaundice, pallor or swollen lymph nodes  Endocrine ROS: negative for - polydipsia/polyuria, skin changes, temperature intolerance or unexpected weight changes  Respiratory ROS: no cough, or wheezing, positive for shortness of breath associated with panic attacks and palpitation  Cardiovascular ROS: no chest pain or dyspnea on exertion, positive for palpitations  Gastrointestinal ROS: no abdominal pain,  or black or bloody stools, positive for change in bowel pattern- now with more loose stools  Genito-Urinary ROS: no dysuria, or hematuria, Patient has difficulty voiding- has been seen by urologist and self-caths herself about twice monthly  Musculoskeletal ROS: as above  Neurological ROS: no TIA or stroke symptoms  Dermatological ROS: negative for - pruritus, rash or skin lesion changes   Psychological ROS: as above   Otherwise negative. Physical Exam:  Blood pressure 156/88, pulse 98, temperature 98.6 °F (37 °C), temperature source Oral, resp. rate 16, height 5' 2\" (1.575 m), weight 78.4 kg (172 lb 12.8 oz), SpO2 99 %. on RA, Body mass index is 31.61 kg/m². General: No distress, acyanotic, appears stated age, cooperative, pleasant  HEENT: PERRL, EOMI, throat without erythema or exudate, Tongue- dental indention on tongue, Mallampati's score 4+, Uvula- midline, Tonsils- 2, + with clefts- no exudates, no acute inflammation, + retrognathia  Neck: Supple,  no abnormally enlarged lymph nodes, thyroid isnon-tender but appears nodular and enlarged by palpation, No JVD, No carotid bruits  Chest: Normal.  Lungs: Moderate air entry, clear to auscultation bilaterally,   Heart: Regular  Rhythm, rapid rate  S1S2 present, without murmur.   Abdomen: Protuberant, bowel sounds normoactive, abdomen is soft without significant tenderness, or guarding. Extremity: Negative for cyanosis, edema, or clubbing. Skin: Skin color, texture, turgor normal. No rashes or lesions. Neurological: CN 2-12 grossly intact, normal muscle tone. - Sensory not assessed, normal gait    Data Reviewed:  CBC:   Lab Results   Component Value Date/Time    WBC 5.1 01/03/2019 09:20 AM    HGB 12.6 (L) 01/03/2019 09:42 AM    HCT 37 (L) 01/03/2019 09:42 AM    PLATELET 184 15/57/7538 09:20 AM    MCV 89.0 01/03/2019 09:20 AM       BMP:   Lab Results   Component Value Date/Time    Sodium 145 01/03/2019 09:42 AM    Potassium 4.3 01/03/2019 09:42 AM    Chloride 108 (H) 01/03/2019 09:42 AM    CO2 22 01/03/2019 09:20 AM    CO2, TOTAL 22 01/03/2019 09:42 AM    Anion gap 7 01/03/2019 09:20 AM    Glucose 62 (L) 01/03/2019 09:42 AM    BUN 32 (H) 01/03/2019 09:42 AM    Creatinine 2.3 (H) 01/03/2019 09:42 AM    BUN/Creatinine ratio 17 02/09/2017 10:00 AM    GFR est AA 33.0 01/03/2019 09:20 AM    GFR est non-AA 27 01/03/2019 09:20 AM    Calcium 9.3 01/03/2019 09:20 AM        TSH:  Lab Results   Component Value Date/Time    TSH 2.38 02/09/2017 10:00 AM    TSH 0.922 09/19/2016 06:27 AM    TSH 3.310 02/09/2014 02:50 AM     Lab Results   Component Value Date/Time    Hemoglobin A1c 9.1 (H) 09/19/2016 06:27 AM    Hemoglobin A1c (POC) 8.5 05/04/2018 10:25 AM    Hemoglobin A1c, External 7.9 04/20/2017     No results found for: BNP, BNPP, BNPPPOC, XBNPT, BNPNT      Imaging:  [x]I have personally reviewed the patients radiographs section   Results from [place] encounter on 01/03/19   XR CHEST SNGL V    Narrative Clinical history: Vomiting    EXAMINATION:  Single view of the chest 1/3/2019    FINDINGS:  Trachea and heart size are within normal limits. Lungs are clear. There is  gaseous distention of the stomach. Impression IMPRESSION:  No acute pulmonary process.          Results from [facility] encounter on 01/03/19   CT HEAD WO CONT    Narrative History: Decreased alertness diabetes. Hypertension. Impression Impression:    Normal noncontrast brain CT. Comment:    CT images of the head were obtained without intravenous contrast. This was  compared with CT of September 18, 2016 and MRI of September 19, 2016. Sherron Claixow No interval change. The brain parenchyma, ventricles and sulci are within the  limits of normal. No infarcts, hemorrhages, mass effect or extracerebral  collections are seen. Cardiac Echo:      10/4/19[facility]  Ordering Physician: Enrrique Henderson  Performed By: Morales Packer, [facility]    Interpretation Summary  A complete two-dimensional transthoracic echocardiogram was performed (2D, M-  mode, Doppler and color flow Doppler). The left ventricle is normal in size with normal systolic function and mild  concentric left ventricular hypertrophy. There are no hemodynamically significant valvular flow abnormalities. Left ventricular systolic function is normal.  The calculated left ventricular ejection fraction is 66%. The right ventricle is normal in size and function. The mitral valve leaflets are sclerotic with normal mobility. There is a previou secho form 09/19/2016 which shows no significant changes. All other findings are noted below. Historical Sleep Testing Data: No Testing To Date.         Corey Hedrick DO, [facility]  Pulmonary, Sleep and Critical Care Medicine

## 2019-06-26 NOTE — PATIENT INSTRUCTIONS
- labs- check thyroid function - Obtain Thyroid US 
- Get in-lab sleep study - F/U PCP regarding insulin pump, need to clarify all medications- not sure what BP meds you are actually taking- consider putting a list on your phone or carry a list with you - F/U with cardiologist and nephrologist as directed

## 2019-06-26 NOTE — PROGRESS NOTES
Prestonroby Harrell presents today for   Chief Complaint   Patient presents with    Sleep Problem       Is someone accompanying this pt? No    Is the patient using any DME equipment during OV? No    -DME Company None    Depression Screening:  3 most recent PHQ Screens 8/14/2018   Little interest or pleasure in doing things Not at all   Feeling down, depressed, irritable, or hopeless Not at all   Total Score PHQ 2 0       Learning Assessment:  Learning Assessment 3/13/2015   PRIMARY LEARNER Patient   HIGHEST LEVEL OF EDUCATION - PRIMARY LEARNER  2 YEARS [facility] PRIMARY LEARNER NONE   CO-LEARNER CAREGIVER No   PRIMARY LANGUAGE ENGLISH   LEARNER PREFERENCE PRIMARY DEMONSTRATION     -   ANSWERED BY patient   RELATIONSHIP SELF       Abuse Screening:  Abuse Screening Questionnaire 5/13/2015   Do you ever feel afraid of your partner? N   Are you in a relationship with someone who physically or mentally threatens you? N   Is it safe for you to go home? Y       Fall Risk  No flowsheet data found. Coordination of Care:  1. Have you been to the ER, urgent care clinic since your last visit? Hospitalized since your last visit? No    2. Have you seen or consulted any other health care providers outside of the [address] [place] [address] since your last visit? Include any pap smears or colon screening.  No

## 2019-06-28 ENCOUNTER — PATIENT MESSAGE (OUTPATIENT)
Dept: FAMILY MEDICINE CLINIC | Age: 34
End: 2019-06-28

## 2019-07-11 ENCOUNTER — OFFICE VISIT (OUTPATIENT)
Dept: FAMILY MEDICINE CLINIC | Age: 34
End: 2019-07-11

## 2019-07-11 VITALS
WEIGHT: 175.2 LBS | TEMPERATURE: 98.4 F | HEART RATE: 90 BPM | OXYGEN SATURATION: 99 % | RESPIRATION RATE: 16 BRPM | HEIGHT: 62 IN | DIASTOLIC BLOOD PRESSURE: 100 MMHG | BODY MASS INDEX: 32.24 KG/M2 | SYSTOLIC BLOOD PRESSURE: 176 MMHG

## 2019-07-11 DIAGNOSIS — E78.00 PURE HYPERCHOLESTEROLEMIA: ICD-10-CM

## 2019-07-11 DIAGNOSIS — E04.9 ENLARGED THYROID: ICD-10-CM

## 2019-07-11 DIAGNOSIS — Z79.4 TYPE 2 DIABETES MELLITUS WITH COMPLICATION, WITH LONG-TERM CURRENT USE OF INSULIN (HCC): ICD-10-CM

## 2019-07-11 DIAGNOSIS — I10 ESSENTIAL HYPERTENSION: Primary | ICD-10-CM

## 2019-07-11 DIAGNOSIS — N92.6 IRREGULAR MENSES: ICD-10-CM

## 2019-07-11 DIAGNOSIS — R80.9 MICROALBUMINURIA: ICD-10-CM

## 2019-07-11 DIAGNOSIS — E11.8 TYPE 2 DIABETES MELLITUS WITH COMPLICATION, WITH LONG-TERM CURRENT USE OF INSULIN (HCC): ICD-10-CM

## 2019-07-11 NOTE — PATIENT INSTRUCTIONS
Diabetes Sick-Day Plan: Care Instructions  Your Care Instructions    If you have diabetes, many other illnesses can make your blood sugar go up. This can be dangerous. When you are sick with the flu or another illness, your body releases hormones to fight infection. These hormones raise blood sugar levels. They also make it hard for insulin or other medicines to lower your blood sugar. Work with your doctor to make a plan for what to do on days when you are sick. Follow-up care is a key part of your treatment and safety. Be sure to make and go to all appointments, and call your doctor if you are having problems. It's also a good idea to know your test results and keep a list of the medicines you take. How can you care for yourself at home? · Work with your doctor to write up a sick-day plan for what to do on days when you are sick. Your blood sugar can go up or down, depending on your illness and whether you can keep food down. Call your doctor when you are sick. Ask if you need to adjust your pills or insulin. · Write down the diabetes medicines you have been taking and whether you have changed the dose based on your sick-day plan. Have this information ready when you call your doctor. · Eat your normal types and amounts of food. Drink extra fluids, such as water, broth, and fruit juice, to prevent dehydration. ? If your blood sugar level is higher than the blood sugar level your doctor recommends (for example, above 240 milligrams per deciliter [mg/dL]), drink extra liquids that do not contain sugar. Examples are water and sugar-free cola. ? If you can't eat your usual foods, drink extra liquids, such as soup, sports drinks, or milk. You may also eat food that is gentle on the stomach. These foods include crackers, gelatin dessert, and applesauce. Try to eat or drink 50 grams of carbohydrates every 3 to 4 hours.  For example, 6 saltine crackers, 1 cup (8 ounces) of milk, and ½ cup (4 ounces) of orange juice each contain about 15 grams of carbohydrate. · Check your blood sugar at least every 3 to 4 hours. If it goes up fast, check it more often. And check it even through the night. Take insulin if your doctor told you to do so. If you and your doctor did not have a sick-day plan for taking extra insulin, call him or her for advice. · If you take insulin, check your urine or blood for ketones. This is even more important if your blood sugar is high. · Do not take any over-the-counter medicines, such as pain relievers, decongestants, or herbal products or other natural medicines, without talking with your doctor first.  · Do not drive. If you need to see your doctor or go anywhere else, ask a family member or friend to drive you. When should you call for help? Call 911 anytime you think you may need emergency care. For example, call if:    · You passed out (lost consciousness).     · You are confused or cannot think clearly.     · Your blood sugar is very high or very low.    Watch closely for changes in your health, and be sure to contact your doctor if:    · Your blood sugar stays outside the level your doctor set for you.     · You have any problems. Where can you learn more? Go to http://aram-klaus.info/. Enter T271 in the search box to learn more about \"Diabetes Sick-Day Plan: Care Instructions. \"  Current as of: July 25, 2018  Content Version: 11.9  © 1396-8514 [employer]. Care instructions adapted under license by [employer] (which disclaims liability or warranty for this information). If you have questions about a medical condition or this instruction, always ask your healthcare professional. [address] any warranty or liability for your use of this information.

## 2019-07-11 NOTE — PROGRESS NOTES
Subjective:   Tj Mathew is a 37 y.o. female with hypertension presents for routine follow up. Patient Active Problem List   Diagnosis Code    Neuropathy G62.9    Diabetes mellitus ([facility] [address] E11.9    Eye problems H57.9    Toe pain M79.676    Hyperlipidemia E78.5    Diabetic gastroparesis associated with type 2 diabetes mellitus (HCC) E11.43, K31.84    Hypovitaminosis D E55.9    Paroxysmal atrial fibrillation (HCC) I48.0    Chronic anticoagulation Z79.01    Transient cerebral ischemia G45.9    Renal insufficiency N28.9    Bad odor of urine R82.90    Type 2 diabetes with nephropathy (HCC) E11.21    Encephalopathy acute G93.40     Current Outpatient Medications   Medication Sig Dispense Refill    XARELTO 15 mg tab tablet TAKE 1 TABLET BY MOUTH DAILY 30 Tab 6    irbesartan (AVAPRO) 150 mg tablet TAKE 1 TABLET BY MOUTH DAILY FOR BLOOD PRESSURE 90 Tab 3    metoprolol tartrate (LOPRESSOR) 50 mg tablet TAKE 1 TABLET BY MOUTH TWICE DAILY 180 Tab 6    metroNIDAZOLE (METROGEL) 0.75 % topical gel Apply  to affected area two (2) times a day. 60 g 0    amLODIPine (NORVASC) 5 mg tablet Take 5 mg by mouth daily.  insulin pump (PATIENT SUPPLIED) misc by SubCUTAneous route as needed.  pregabalin (LYRICA) 75 mg capsule Take 1 Cap by mouth three (3) times daily. 90 Cap 3    sodium bicarbonate 650 mg tablet take 1 tablet by mouth 2 times a day  2    simvastatin (ZOCOR) 40 mg tablet TAKE 1 TABLET BY MOUTH NIGHTLY 30 Tab 6    furosemide (LASIX) 20 mg tablet Take as needed daily for leg swelling. (Patient taking differently: Take 20 mg by mouth as needed. Take as needed daily for leg swelling.) 30 Tab 3    insulin lispro (HUMALOG) 100 unit/mL injection Given via pump. Pump set every 90 days in provider's office.  Blood-Glucose Meter monitoring kit by Does Not Apply route two (2) times a day. 1 Kit 0    glucose blood VI test strips (ONE TOUCH ULTRA TEST) strip by Does Not Apply route.  One touch ultra mini test strips 1 Package 11      Allergies   Allergen Reactions    Latex Hives    Contrast Agent [Iodine] Other (comments)     \"Burning with shooting pain\"    Macrobid [Nitrofurantoin Monohyd/M-Cryst] Diarrhea    Novolog Mix 70-30 [Insulin Asp Prt-Insulin Aspart] Nausea and Vomiting     Past Surgical History:   Procedure Laterality Date    HX ORTHOPAEDIC  January 2013    right toe nail surgery Dr. Dre Torres     Family History   Problem Relation Age of Onset    Diabetes Mother     Cancer Paternal Grandmother       Lab Results   Component Value Date/Time    Cholesterol, total 288 (H) 02/09/2017 10:00 AM    Cholesterol (POC) 267 01/25/2013 12:05 PM    HDL Cholesterol 55 02/09/2017 10:00 AM    HDL Cholesterol (POC) 37 01/25/2013 12:05 PM    LDL Cholesterol (POC) 185 01/25/2013 12:05 PM    LDL, calculated 189.6 (H) 02/09/2017 10:00 AM    VLDL, calculated 43.4 02/09/2017 10:00 AM    Triglyceride 217 (H) 02/09/2017 10:00 AM    Triglycerides (POC) 225 01/25/2013 12:05 PM    CHOL/HDL Ratio 5.2 (H) 02/09/2017 10:00 AM     Lab Results   Component Value Date/Time    Sodium 145 01/03/2019 09:42 AM    Potassium 4.3 01/03/2019 09:42 AM    Chloride 108 (H) 01/03/2019 09:42 AM    CO2 22 01/03/2019 09:20 AM    CO2, TOTAL 22 01/03/2019 09:42 AM    Anion gap 7 01/03/2019 09:20 AM    Glucose 62 (L) 01/03/2019 09:42 AM    BUN 32 (H) 01/03/2019 09:42 AM    Creatinine 2.3 (H) 01/03/2019 09:42 AM    BUN/Creatinine ratio 17 02/09/2017 10:00 AM    GFR est AA 33.0 01/03/2019 09:20 AM    GFR est non-AA 27 01/03/2019 09:20 AM    Calcium 9.3 01/03/2019 09:20 AM    Bilirubin, total 0.2 01/03/2019 09:20 AM    AST (SGOT) 45 (H) 01/03/2019 09:20 AM    Alk.  phosphatase 88 01/03/2019 09:20 AM    Protein, total 8.3 (H) 01/03/2019 09:20 AM    Albumin 3.1 (L) 01/03/2019 09:20 AM    A-G Ratio 1.2 09/24/2013 11:32 AM    ALT (SGPT) 24 01/03/2019 09:20 AM     Lab Results   Component Value Date/Time    WBC 5.1 01/03/2019 09:20 AM    HGB 12.6 (L) 01/03/2019 09:42 AM    HCT 37 (L) 01/03/2019 09:42 AM    PLATELET 723 [birth year]1939 09:20 AM    MCV 89.0 01/03/2019 09:20 AM     Lab Results   Component Value Date/Time    Hemoglobin A1c 9.1 (H) 09/19/2016 06:27 AM    Hemoglobin A1c (POC) 8.5 05/04/2018 10:25 AM    Hemoglobin A1c, External 7.9 04/20/2017     Wt Readings from Last 3 Encounters:   07/11/19 175 lb 3.2 oz (79.5 kg)   06/26/19 172 lb 12.8 oz (78.4 kg)   03/08/19 182 lb (82.6 kg)     Last Point of Care HGB A1C  Hemoglobin A1c (POC)   Date Value Ref Range Status   05/04/2018 8.5 % Final      BP Readings from Last 3 Encounters:   07/11/19 (!) 164/102   06/26/19 156/88   03/08/19 (!) 142/100       Hypertension ROS: taking medications as instructed, no medication side effects noted, no TIA's, no chest pain on exertion, no dyspnea on exertion, no swelling of ankles. New concerns: states she has had increased personal stressors. States she has scheduled an appt with endocrinology per patient needs to be referred. Also requesting medication for gustatory sweating. States symptoms have improved some. Further comments her menstrual cycles have been irregular. Has not seen by gyn in the past    Objective:   Visit Vitals  BP (!) 176/100 (BP 1 Location: Right arm, BP Patient Position: Sitting)   Pulse 90   Temp 98.4 °F (36.9 °C) (Oral)   Resp 16   Ht 5' 2\" (1.575 m)   Wt 175 lb 3.2 oz (79.5 kg)   LMP 06/16/2019 (Exact Date)   SpO2 99%   BMI 32.04 kg/m²      General appearance - alert, well appearing, and in no distress  Neck - supple, no significant adenopathy, carotids upstroke normal bilaterally, no bruits  Chest - clear to auscultation, no wheezes, rales or rhonchi, symmetric air entry  Heart - normal rate, regular rhythm, normal S1, S2, no murmurs, rubs, clicks or gallops  Extremities - peripheral pulses normal, no pedal edema, no clubbing or cyanosis          Assessment/Plan:      ICD-10-CM ICD-9-CM    1.  Essential hypertension I17 686.3 METABOLIC PANEL, COMPREHENSIVE   2. Type 2 diabetes mellitus with complication, with long-term current use of insulin (HCC) E11.8 250.90 HEMOGLOBIN A1C WITH EAG    C08.4 Q68.92 METABOLIC PANEL, COMPREHENSIVE      MICROALBUMIN, UR, RAND W/ MICROALB/CREAT RATIO   3. Pure hypercholesterolemia E78.00 272.0 LIPID PANEL      METABOLIC PANEL, COMPREHENSIVE   4. Microalbuminuria R80.9 791.0 MICROALBUMIN, UR, RAND W/ MICROALB/CREAT RATIO      CBC WITH AUTOMATED DIFF      RENAL FUNCTION PANEL   5. Enlarged thyroid E04.9 240.9 TSH 3RD GENERATION      T3 TOTAL      T4, FREE   6. Irregular menses N92.6 626.4 REFERRAL TO OBSTETRICS AND GYNECOLOGY     Orders Placed This Encounter    HEMOGLOBIN A1C WITH EAG    LIPID PANEL    METABOLIC PANEL, COMPREHENSIVE    MICROALBUMIN, UR, RAND W/ MICROALB/CREAT RATIO    CBC WITH AUTOMATED DIFF    RENAL FUNCTION PANEL    TSH 3RD GENERATION    T3 TOTAL    T4, FREE    CBC WITH AUTOMATED DIFF    METABOLIC PANEL, COMPREHENSIVE    RENAL FUNCTION PANEL    LIPID PANEL    MICROALBUMIN, UR, RAND    HEMOGLOBIN A1C WITH EAG    TSH 3RD GENERATION    T4, FREE    T3 TOTAL    REFERRAL TO OBSTETRICS AND GYNECOLOGY    fluconazole (DIFLUCAN) 150 mg tablet    oxybutynin (DITROPAN) 5 mg tablet     Instructed to adhere to adhere to low sodium diet  I have discussed the diagnosis with the patient and the intended plan as seen in the above orders. The patient has received an after-visit summary and questions were answered concerning future plans. I have discussed medication side effects and warnings with the patient as well. Patient agreeable with above plan and verbalizes understanding. Follow-up and Dispositions    · Return in about 1 month (around 8/8/2019) for HTN/sweating with eating.

## 2019-07-11 NOTE — PROGRESS NOTES
Pt is here for follow up hypertension    1. Have you been to the ER, urgent care clinic since your last visit? Hospitalized since your last visit? No    2. Have you seen or consulted any other health care providers outside of the [address] [place] [address] since your last visit? Include any pap smears or colon screening.  No

## 2019-07-12 LAB
ALBUMIN SERPL-MCNC: 3.7 G/DL (ref 3.5–5.5)
ALBUMIN/CREAT UR: 3076.9 MG/G CREAT (ref 0–30)
ALBUMIN/GLOB SERPL: 1.2 {RATIO} (ref 1.2–2.2)
ALP SERPL-CCNC: 98 IU/L (ref 39–117)
ALT SERPL-CCNC: 12 IU/L (ref 0–32)
AST SERPL-CCNC: 14 IU/L (ref 0–40)
BASOPHILS # BLD AUTO: 0 X10E3/UL (ref 0–0.2)
BASOPHILS NFR BLD AUTO: 1 %
BILIRUB SERPL-MCNC: <0.2 MG/DL (ref 0–1.2)
BUN SERPL-MCNC: 34 MG/DL (ref 6–20)
BUN/CREAT SERPL: 14 (ref 9–23)
CALCIUM SERPL-MCNC: 8.8 MG/DL (ref 8.7–10.2)
CHLORIDE SERPL-SCNC: 105 MMOL/L (ref 96–106)
CHOLEST SERPL-MCNC: 183 MG/DL (ref 100–199)
CO2 SERPL-SCNC: 16 MMOL/L (ref 20–29)
CREAT SERPL-MCNC: 2.39 MG/DL (ref 0.57–1)
CREAT UR-MCNC: 72.4 MG/DL
EOSINOPHIL # BLD AUTO: 0.1 X10E3/UL (ref 0–0.4)
EOSINOPHIL NFR BLD AUTO: 1 %
ERYTHROCYTE [DISTWIDTH] IN BLOOD BY AUTOMATED COUNT: 14 % (ref 12.3–15.4)
EST. AVERAGE GLUCOSE BLD GHB EST-MCNC: 269 MG/DL
GLOBULIN SER CALC-MCNC: 3.1 G/DL (ref 1.5–4.5)
GLUCOSE SERPL-MCNC: 195 MG/DL (ref 65–99)
HBA1C MFR BLD: 11 % (ref 4.8–5.6)
HCT VFR BLD AUTO: 27.6 % (ref 34–46.6)
HDLC SERPL-MCNC: 48 MG/DL
HGB BLD-MCNC: 8.8 G/DL (ref 11.1–15.9)
IMM GRANULOCYTES # BLD AUTO: 0 X10E3/UL (ref 0–0.1)
IMM GRANULOCYTES NFR BLD AUTO: 0 %
LDLC SERPL CALC-MCNC: 114 MG/DL (ref 0–99)
LYMPHOCYTES # BLD AUTO: 2.1 X10E3/UL (ref 0.7–3.1)
LYMPHOCYTES NFR BLD AUTO: 34 %
MCH RBC QN AUTO: 27.6 PG (ref 26.6–33)
MCHC RBC AUTO-ENTMCNC: 31.9 G/DL (ref 31.5–35.7)
MCV RBC AUTO: 87 FL (ref 79–97)
MICROALBUMIN UR-MCNC: 2227.7 UG/ML
MONOCYTES # BLD AUTO: 0.4 X10E3/UL (ref 0.1–0.9)
MONOCYTES NFR BLD AUTO: 6 %
NEUTROPHILS # BLD AUTO: 3.7 X10E3/UL (ref 1.4–7)
NEUTROPHILS NFR BLD AUTO: 58 %
PHOSPHATE SERPL-MCNC: 3.4 MG/DL (ref 2.5–4.5)
PLATELET # BLD AUTO: 225 X10E3/UL (ref 150–450)
POTASSIUM SERPL-SCNC: 5.3 MMOL/L (ref 3.5–5.2)
PROT SERPL-MCNC: 6.8 G/DL (ref 6–8.5)
RBC # BLD AUTO: 3.19 X10E6/UL (ref 3.77–5.28)
SODIUM SERPL-SCNC: 138 MMOL/L (ref 134–144)
T3 SERPL-MCNC: 99 NG/DL (ref 71–180)
T4 FREE SERPL-MCNC: 1.05 NG/DL (ref 0.82–1.77)
TRIGL SERPL-MCNC: 103 MG/DL (ref 0–149)
TSH SERPL DL<=0.005 MIU/L-ACNC: 2.14 UIU/ML (ref 0.45–4.5)
VLDLC SERPL CALC-MCNC: 21 MG/DL (ref 5–40)
WBC # BLD AUTO: 6.3 X10E3/UL (ref 3.4–10.8)

## 2019-07-12 RX ORDER — FLUCONAZOLE 150 MG/1
150 TABLET ORAL
Qty: 3 TAB | Refills: 0 | Status: SHIPPED | OUTPATIENT
Start: 2019-07-12 | End: 2019-07-19

## 2019-07-12 RX ORDER — OXYBUTYNIN CHLORIDE 5 MG/1
2.5 TABLET ORAL DAILY
Qty: 30 TAB | Refills: 1 | Status: SHIPPED | OUTPATIENT
Start: 2019-07-12 | End: 2019-09-12 | Stop reason: SDUPTHER

## 2019-07-21 ENCOUNTER — HOSPITAL ENCOUNTER (OUTPATIENT)
Dept: SLEEP MEDICINE | Age: 34
Discharge: HOME | End: 2019-07-21
Payer: MEDICARE

## 2019-07-21 DIAGNOSIS — G47.19 EXCESSIVE DAYTIME SLEEPINESS: ICD-10-CM

## 2019-07-21 DIAGNOSIS — E78.5 HYPERLIPIDEMIA, UNSPECIFIED HYPERLIPIDEMIA TYPE: ICD-10-CM

## 2019-07-21 DIAGNOSIS — E66.9 OBESITY (BMI 30-39.9): ICD-10-CM

## 2019-07-21 DIAGNOSIS — R06.83 SNORING: ICD-10-CM

## 2019-07-21 DIAGNOSIS — I48.0 PAROXYSMAL ATRIAL FIBRILLATION (HCC): ICD-10-CM

## 2019-07-21 DIAGNOSIS — K31.84 DIABETIC GASTROPARESIS ASSOCIATED WITH TYPE 2 DIABETES MELLITUS (HCC): ICD-10-CM

## 2019-07-21 DIAGNOSIS — E04.1 THYROID NODULE: ICD-10-CM

## 2019-07-21 DIAGNOSIS — I10 ESSENTIAL HYPERTENSION: ICD-10-CM

## 2019-07-21 DIAGNOSIS — Z86.73 HISTORY OF TIA (TRANSIENT ISCHEMIC ATTACK): ICD-10-CM

## 2019-07-21 DIAGNOSIS — N18.30 STAGE 3 CHRONIC KIDNEY DISEASE (HCC): ICD-10-CM

## 2019-07-21 DIAGNOSIS — E11.43 DIABETIC GASTROPARESIS ASSOCIATED WITH TYPE 2 DIABETES MELLITUS (HCC): ICD-10-CM

## 2019-07-21 PROCEDURE — 95810 POLYSOM 6/> YRS 4/> PARAM: CPT

## 2019-07-22 VITALS — DIASTOLIC BLOOD PRESSURE: 80 MMHG | SYSTOLIC BLOOD PRESSURE: 125 MMHG | HEART RATE: 80 BPM

## 2019-07-31 NOTE — PROGRESS NOTES
The patient underwent sleep testing on 7/21/19. Recommendations listed below. Please refer to full report for specific details. Overall, patient tolerated study well. Sleep onset insomnia was noted which may be related to first night effect. Mild snoring was noted. REM sleep was achieved in the lateral recumbent position but REM supine sleep was not achieved. No findings of Obstructive Sleep Apnea (SHIRLEY) in this study. It is possible that due to reduced sleep time and lack of REM supine sleep, the severity of SHIRLEY physiology may be underestimated and/or muted. The PLM index was elevated. In the proper clinical setting, these findings can support a diagnosis of a periodic limb movement disorder, periodic limb movements of sleep or chronic pain conditions such as a radiculopathy. Further clinical correlation is required. <U>DIAGNOSIS: 1) Primary Snoring  2) Insomnia- Sleep Onset 3) Tachycardia- mild, intermittent. RECOMMENDATIONS:      If there is a strong clinical suspicion for SHIRLEY, then consider repeating study with a sleep aid.  If snoring is disruptive a trial of antihistamines, external nasal strips, and/or nasal steroids may be beneficial.   Other non-invasive treatment options are recommended were applicable and include the following: weight reduction, smoking cessation, and modification of alcohol ingestion and/or sedating agents.  If sino-nasal anatomic abnormalities are present, an ENT evaluation for possible surgical correction may be considered.  Clinical correlation of elevated PLM index.  Healthy sleep habit education and reinforcement will be reviewed with the patient.  Individuals are encouraged to obtain 7-9 hours of sleep per day.   safety is encouraged. Drowsy and/or inattentive driving should be avoided.  Follow up with referring provider.        Poonam Suazo DO, [facility]    [facility]  Pulmonary, Critical Care, and Sleep Medicine

## 2019-08-07 ENCOUNTER — TELEPHONE (OUTPATIENT)
Dept: PULMONOLOGY | Age: 34
End: 2019-08-07

## 2019-08-08 ENCOUNTER — TELEPHONE (OUTPATIENT)
Dept: FAMILY MEDICINE CLINIC | Age: 34
End: 2019-08-08

## 2019-08-08 ENCOUNTER — OFFICE VISIT (OUTPATIENT)
Dept: FAMILY MEDICINE CLINIC | Age: 34
End: 2019-08-08

## 2019-08-08 ENCOUNTER — DOCUMENTATION ONLY (OUTPATIENT)
Dept: FAMILY MEDICINE CLINIC | Age: 34
End: 2019-08-08

## 2019-08-08 VITALS
TEMPERATURE: 98.9 F | BODY MASS INDEX: 32.57 KG/M2 | HEIGHT: 62 IN | DIASTOLIC BLOOD PRESSURE: 92 MMHG | WEIGHT: 177 LBS | SYSTOLIC BLOOD PRESSURE: 136 MMHG | OXYGEN SATURATION: 98 % | HEART RATE: 92 BPM | RESPIRATION RATE: 16 BRPM

## 2019-08-08 DIAGNOSIS — E78.00 PURE HYPERCHOLESTEROLEMIA: ICD-10-CM

## 2019-08-08 DIAGNOSIS — M79.651 RIGHT THIGH PAIN: ICD-10-CM

## 2019-08-08 DIAGNOSIS — Z00.00 MEDICARE ANNUAL WELLNESS VISIT, SUBSEQUENT: ICD-10-CM

## 2019-08-08 DIAGNOSIS — E66.9 OBESITY, CLASS I, BMI 30-34.9: ICD-10-CM

## 2019-08-08 DIAGNOSIS — I10 ESSENTIAL HYPERTENSION: Primary | ICD-10-CM

## 2019-08-08 DIAGNOSIS — E11.21 TYPE 2 DIABETES WITH NEPHROPATHY (HCC): ICD-10-CM

## 2019-08-08 RX ORDER — VALACYCLOVIR HYDROCHLORIDE 1 G/1
1000 TABLET, FILM COATED ORAL DAILY
Qty: 5 TAB | Refills: 5 | Status: SHIPPED | OUTPATIENT
Start: 2019-08-08 | End: 2020-03-09

## 2019-08-08 RX ORDER — LANOLIN ALCOHOL/MO/W.PET/CERES
325 CREAM (GRAM) TOPICAL DAILY
Qty: 30 TAB | Refills: 2 | Status: SHIPPED | OUTPATIENT
Start: 2019-08-08 | End: 2020-12-01

## 2019-08-08 NOTE — PROGRESS NOTES
Gary Ramirez presents today for   Chief Complaint   Patient presents with    Follow-up     sweating    Hypertension       Is someone accompanying this pt? no    Is the patient using any DME equipment during OV? no    Depression Screening:  3 most recent PHQ Screens 6/26/2019   Little interest or pleasure in doing things Not at all   Feeling down, depressed, irritable, or hopeless Not at all   Total Score PHQ 2 0       Learning Assessment:  Learning Assessment 3/13/2015   PRIMARY LEARNER Patient   HIGHEST LEVEL OF EDUCATION - PRIMARY LEARNER  2 YEARS [place] PRIMARY LEARNER NONE   CO-LEARNER CAREGIVER No   PRIMARY LANGUAGE ENGLISH   LEARNER PREFERENCE PRIMARY DEMONSTRATION     -   ANSWERED BY patient   RELATIONSHIP SELF       Abuse Screening:  Abuse Screening Questionnaire 5/13/2015   Do you ever feel afraid of your partner? N   Are you in a relationship with someone who physically or mentally threatens you? N   Is it safe for you to go home? Y       Fall Risk  No flowsheet data found. Health Maintenance reviewed and discussed and ordered per Provider. Health Maintenance Due   Topic Date Due    Pneumococcal 0-64 years (1 of 3 - PCV13) 04/08/1989    MEDICARE YEARLY EXAM  05/05/2019    Influenza Age 9 to Adult  08/01/2019           Coordination of Care:  1. Have you been to the ER, urgent care clinic since your last visit? Hospitalized since your last visit? no    2. Have you seen or consulted any other health care providers outside of the [address] [place] [address] since your last visit? Include any pap smears or colon screening.  no

## 2019-08-08 NOTE — PROGRESS NOTES
HISTORY OF PRESENT ILLNESS  Ho Kauffman is a 37 y.o. female presents today to follow up on gustatory sweating  HPI   Patient states oxybutynin has greatly helped her symptoms. Comments she is now able to eat without sweating. Denies any adverse effects with taking medication. Patient states she fell approx. 2 months ago while carrying her son's laundry basket. States bruising has resolved. Reports continues to feel a lump on her hip. Denies pain. Has been seen by nephrology. States she was informed to take ferrous sulfate daily due to anemia. Requesting rx to be sent to the pharmacy. Allergies   Allergen Reactions    Latex Hives    Contrast Agent [Iodine] Other (comments)     \"Burning with shooting pain\"    Macrobid [Nitrofurantoin Monohyd/M-Cryst] Diarrhea    Novolog Mix 70-30 [Insulin Asp Prt-Insulin Aspart] Nausea and Vomiting     Current Outpatient Medications   Medication Sig Dispense Refill    oxybutynin (DITROPAN) 5 mg tablet Take 0.5 Tabs by mouth daily. 30 Tab 1    XARELTO 15 mg tab tablet TAKE 1 TABLET BY MOUTH DAILY 30 Tab 6    irbesartan (AVAPRO) 150 mg tablet TAKE 1 TABLET BY MOUTH DAILY FOR BLOOD PRESSURE 90 Tab 3    metoprolol tartrate (LOPRESSOR) 50 mg tablet TAKE 1 TABLET BY MOUTH TWICE DAILY 180 Tab 6    metroNIDAZOLE (METROGEL) 0.75 % topical gel Apply  to affected area two (2) times a day. 60 g 0    amLODIPine (NORVASC) 5 mg tablet Take 5 mg by mouth daily.  insulin pump (PATIENT SUPPLIED) misc by SubCUTAneous route as needed.  pregabalin (LYRICA) 75 mg capsule Take 1 Cap by mouth three (3) times daily. 90 Cap 3    sodium bicarbonate 650 mg tablet take 1 tablet by mouth 2 times a day  2    simvastatin (ZOCOR) 40 mg tablet TAKE 1 TABLET BY MOUTH NIGHTLY 30 Tab 6    furosemide (LASIX) 20 mg tablet Take as needed daily for leg swelling. (Patient taking differently: Take 20 mg by mouth as needed.  Take as needed daily for leg swelling.) 30 Tab 3    insulin lispro (HUMALOG) 100 unit/mL injection Given via pump. Pump set every 90 days in provider's office.  Blood-Glucose Meter monitoring kit by Does Not Apply route two (2) times a day. 1 Kit 0    glucose blood VI test strips (ONE TOUCH ULTRA TEST) strip by Does Not Apply route. One touch ultra mini test strips 1 Package 11     Past Medical History:   Diagnosis Date    Cardiac echocardiogram 09/19/2016    [facility]:  Tech difficult. Pt refused 2nd attempt at bubble study. EF 60%. No WMA. Mild conc LVH. RVSP 20 mmHg. No atrial shunting by Doppler.  Cardiovascular lower extremity venous duplex 06/20/2016    No DVT bilaterally.  Carotid duplex 09/19/2016    Mild < 50% bilateral ICA stenosis.     Cerebral artery occlusion with cerebral infarction ([address]     Diabetes ([address]     Diabetic neuropathy ([address]     Diabetic retinopathy     Hypercholesterolemia     Hypertension      Social History     Socioeconomic History    Marital status: SINGLE     Spouse name: Not on file    Number of children: Not on file    Years of education: Not on file    Highest education level: Not on file   Occupational History     Comment: applying for disability (diabetes)   Social Needs    Financial resource strain: Not on file    Food insecurity:     Worry: Not on file     Inability: Not on file    Transportation needs:     Medical: Not on file     Non-medical: Not on file   Tobacco Use    Smoking status: Never Smoker    Smokeless tobacco: Never Used   Substance and Sexual Activity    Alcohol use: No    Drug use: No    Sexual activity: Never   Lifestyle    Physical activity:     Days per week: Not on file     Minutes per session: Not on file    Stress: Not on file   Relationships    Social connections:     Talks on phone: Not on file     Gets together: Not on file     Attends [employer] service: Not on file     Active member of club or organization: Not on file     Attends meetings of clubs or organizations: Not on file     Relationship status: Not on file    Intimate partner violence:     Fear of current or ex partner: Not on file     Emotionally abused: Not on file     Physically abused: Not on file     Forced sexual activity: Not on file   Other Topics Concern    Not on file   Social History Narrative    Not on file     Wt Readings from Last 3 Encounters:   08/08/19 177 lb (80.3 kg)   07/11/19 175 lb 3.2 oz (79.5 kg)   06/26/19 172 lb 12.8 oz (78.4 kg)     BP Readings from Last 3 Encounters:   08/08/19 (!) 136/92   07/22/19 125/80   07/11/19 (!) 176/100     Review of Systems   Constitutional: Negative for chills and fever. Respiratory: Negative for shortness of breath. Cardiovascular: Negative for chest pain and palpitations. Neurological: Negative for dizziness and headaches. BP (!) 136/92 (BP 1 Location: Left arm, BP Patient Position: Sitting)   Pulse 92   Temp 98.9 °F (37.2 °C) (Oral)   Resp 16   Ht 5' 2\" (1.575 m)   Wt 177 lb (80.3 kg)   LMP 07/14/2019   SpO2 98%   BMI 32.37 kg/m²      Physical Exam   Constitutional: She is oriented to person, place, and time. She appears well-developed and well-nourished. HENT:   Head: Normocephalic and atraumatic. Neck: Normal range of motion. Neck supple. Cardiovascular: Normal rate, regular rhythm and normal heart sounds. Exam reveals no gallop and no friction rub. No murmur heard. Pulmonary/Chest: Effort normal and breath sounds normal. She has no wheezes. She has no rhonchi. She has no rales. Musculoskeletal:   Palpable non tender lump to right lateral thigh   Neurological: She is alert and oriented to person, place, and time. Skin: Skin is warm and dry. Psychiatric: She has a normal mood and affect. Her behavior is normal. Judgment and thought content normal.       ASSESSMENT and PLAN    ICD-10-CM ICD-9-CM    1. Essential hypertension I10 401.9    2. Medicare annual wellness visit, subsequent Z00.00 V70.0    3.  Pure hypercholesterolemia E78.00 272.0    4. Obesity, Class I, BMI 30-34.9 E66.9 278.00    5. Right thigh pain M79.651 729.5 US EXT NONVAS RT LTD   6. Type 2 diabetes with nephropathy (HCC) E11.21 250.40 REFERRAL TO NEPHROLOGY     583.81 REFERRAL TO ENDOCRINOLOGY     Orders Placed This Encounter    US EXT NONVAS RT LTD    REFERRAL TO NEPHROLOGY    REFERRAL TO ENDOCRINOLOGY    ferrous sulfate 325 mg (65 mg iron) tablet    valACYclovir (VALTREX) 1 gram tablet     Requesting new nephrologist close to her home in [place], new referral placed  I have discussed the diagnosis with the patient and the intended plan as seen in the above orders. The patient has received an after-visit summary and questions were answered concerning future plans. I have discussed medication side effects and warnings with the patient as well. Patient agreeable with above plan and verbalizes understanding. Follow-up and Dispositions    · Return in about 1 month (around 9/5/2019) for thigh bruise.

## 2019-08-08 NOTE — PATIENT INSTRUCTIONS
Medicare Wellness Visit, Female The best way to live healthy is to have a lifestyle where you eat a well-balanced diet, exercise regularly, limit alcohol use, and quit all forms of tobacco/nicotine, if applicable. Regular preventive services are another way to keep healthy. Preventive services (vaccines, screening tests, monitoring & exams) can help personalize your care plan, which helps you manage your own care. Screening tests can find health problems at the earliest stages, when they are easiest to treat. [facility] follows the current, evidence-based guidelines published by the [employer] ([facility]) when recommending preventive services for our patients. Because we follow these guidelines, sometimes recommendations change over time as research supports it. (For example, mammograms used to be recommended annually. Even though Medicare will still pay for an annual mammogram, the newer guidelines recommend a mammogram every two years for women of average risk.) Of course, you and your doctor may decide to screen more often for some diseases, based on your risk and your health status. Preventive services for you include: - Medicare offers their members a free annual wellness visit, which is time for you and your primary care provider to discuss and plan for your preventive service needs. Take advantage of this benefit every year! 
-All adults over the age of 72 should receive the recommended pneumonia vaccines. Current USPSTF guidelines recommend a series of two vaccines for the best pneumonia protection.  
-All adults should have a flu vaccine yearly and a tetanus vaccine every 10 years. All adults age 61 and older should receive a shingles vaccine once in their lifetime.   
-A bone mass density test is recommended when a woman turns 65 to screen for osteoporosis. This test is only recommended one time, as a screening. Some providers will use this same test as a disease monitoring tool if you already have osteoporosis. -All adults age 38-68 who are overweight should have a diabetes screening test once every three years.  
-Other screening tests and preventive services for persons with diabetes include: an eye exam to screen for diabetic retinopathy, a kidney function test, a foot exam, and stricter control over your cholesterol.  
-Cardiovascular screening for adults with routine risk involves an electrocardiogram (ECG) at intervals determined by your doctor.  
-Colorectal cancer screenings should be done for adults age 54-65 with no increased risk factors for colorectal cancer. There are a number of acceptable methods of screening for this type of cancer. Each test has its own benefits and drawbacks. Discuss with your doctor what is most appropriate for you during your annual wellness visit. The different tests include: colonoscopy (considered the best screening method), a fecal occult blood test, a fecal DNA test, and sigmoidoscopy. -Breast cancer screenings are recommended every other year for women of normal risk, age 54-69. 
-Cervical cancer screenings for women over age 72 are only recommended with certain risk factors.  
-All adults born between [facility] should be screened once for Hepatitis C. Here is a list of your current Health Maintenance items (your personalized list of preventive services) with a due date: 
Health Maintenance Due Topic Date Due  Pneumococcal Vaccine (1 of 3 - PCV13) 04/08/1991 [facility] Annual Well Visit  05/05/2019  Flu Vaccine  08/01/2019

## 2019-08-08 NOTE — PROGRESS NOTES
This is the Subsequent Medicare Annual Wellness Exam, performed 12 months or more after the Initial AWV or the last Subsequent AWV    I have reviewed the patient's medical history in detail and updated the computerized patient record. History     Past Medical History:   Diagnosis Date    Cardiac echocardiogram 09/19/2016    [facility]:  Tech difficult. Pt refused 2nd attempt at bubble study. EF 60%. No WMA. Mild conc LVH. RVSP 20 mmHg. No atrial shunting by Doppler.  Cardiovascular lower extremity venous duplex 06/20/2016    No DVT bilaterally.  Carotid duplex 09/19/2016    Mild < 50% bilateral ICA stenosis.  Cerebral artery occlusion with cerebral infarction [facility]     Diabetes ([facility]     Diabetic neuropathy ([facility]     Diabetic retinopathy     Hypercholesterolemia     Hypertension       Past Surgical History:   Procedure Laterality Date    HX ORTHOPAEDIC  January 2013    right toe nail surgery Dr. Catracho Thomas     Current Outpatient Medications   Medication Sig Dispense Refill    oxybutynin (DITROPAN) 5 mg tablet Take 0.5 Tabs by mouth daily. 30 Tab 1    XARELTO 15 mg tab tablet TAKE 1 TABLET BY MOUTH DAILY 30 Tab 6    irbesartan (AVAPRO) 150 mg tablet TAKE 1 TABLET BY MOUTH DAILY FOR BLOOD PRESSURE 90 Tab 3    metoprolol tartrate (LOPRESSOR) 50 mg tablet TAKE 1 TABLET BY MOUTH TWICE DAILY 180 Tab 6    metroNIDAZOLE (METROGEL) 0.75 % topical gel Apply  to affected area two (2) times a day. 60 g 0    amLODIPine (NORVASC) 5 mg tablet Take 5 mg by mouth daily.  insulin pump (PATIENT SUPPLIED) misc by SubCUTAneous route as needed.  pregabalin (LYRICA) 75 mg capsule Take 1 Cap by mouth three (3) times daily. 90 Cap 3    sodium bicarbonate 650 mg tablet take 1 tablet by mouth 2 times a day  2    simvastatin (ZOCOR) 40 mg tablet TAKE 1 TABLET BY MOUTH NIGHTLY 30 Tab 6    furosemide (LASIX) 20 mg tablet Take as needed daily for leg swelling.  (Patient taking differently: Take 20 mg by mouth as needed. Take as needed daily for leg swelling.) 30 Tab 3    insulin lispro (HUMALOG) 100 unit/mL injection Given via pump. Pump set every 90 days in provider's office.  Blood-Glucose Meter monitoring kit by Does Not Apply route two (2) times a day. 1 Kit 0    glucose blood VI test strips (ONE TOUCH ULTRA TEST) strip by Does Not Apply route. One touch ultra mini test strips 1 Package 11     Allergies   Allergen Reactions    Latex Hives    Contrast Agent [Iodine] Other (comments)     \"Burning with shooting pain\"   Abigail Kiersten [Nitrofurantoin Monohyd/M-Cryst] Diarrhea    Novolog Mix 70-30 [Insulin Asp Prt-Insulin Aspart] Nausea and Vomiting     Family History   Problem Relation Age of Onset    Diabetes Mother     Cancer Paternal Grandmother      Social History     Tobacco Use    Smoking status: Never Smoker    Smokeless tobacco: Never Used   Substance Use Topics    Alcohol use: No     Patient Active Problem List   Diagnosis Code    Neuropathy G62.9    Diabetes mellitus ([facility] [address] E11.9    Eye problems H57.9    Toe pain M79.676    Hyperlipidemia E78.5    Diabetic gastroparesis associated with type 2 diabetes mellitus (HCC) E11.43, K31.84    Hypovitaminosis D E55.9    Paroxysmal atrial fibrillation (HCC) I48.0    Chronic anticoagulation Z79.01    Transient cerebral ischemia G45.9    Renal insufficiency N28.9    Bad odor of urine R82.90    Type 2 diabetes with nephropathy (HCC) E11.21    Encephalopathy acute G93.40       Depression Risk Factor Screening:     3 most recent PHQ Screens 6/26/2019   Little interest or pleasure in doing things Not at all   Feeling down, depressed, irritable, or hopeless Not at all   Total Score PHQ 2 0     Alcohol Risk Factor Screening: You do not drink alcohol or very rarely. Functional Ability and Level of Safety:   Hearing Loss  Hearing is good. Activities of Daily Living  The home contains: no safety equipment.   Patient does total self care    Fall Risk  No flowsheet data found. Abuse Screen  Patient is not abused    Cognitive Screening   Evaluation of Cognitive Function:  Has your family/caregiver stated any concerns about your memory: no  Normal    Patient Care Team   Patient Care Team:  Kendal Clemente NP as PCP - General (Nurse Practitioner)  Reynold Cavanaugh MD (Family Practice)  John Charlton MD (Inactive) (Cardiology)  Sosa Magallon MD as Physician (Urology)  Antwan Figueroa DO (Pulmonary Disease)    Assessment/Plan   Education and counseling provided:  Are appropriate based on today's review and evaluation    Diagnoses and all orders for this visit:    1. Essential hypertension    2. Medicare annual wellness visit, subsequent    3. Pure hypercholesterolemia    4. Obesity, Class I, BMI 30-34.9    5. Right thigh pain  -     US EXT NONVAS RT LTD; Future    6. Type 2 diabetes with nephropathy (HCC)  -     REFERRAL TO NEPHROLOGY  -     REFERRAL TO ENDOCRINOLOGY    Other orders  -     ferrous sulfate 325 mg (65 mg iron) tablet; Take 1 Tab by mouth daily. -     valACYclovir (VALTREX) 1 gram tablet; Take 1 Tab by mouth daily for 5 days.       Health Maintenance Due   Topic Date Due    Pneumococcal 0-64 years (1 of 3 - PCV13) 04/08/1991    MEDICARE YEARLY EXAM  05/05/2019    Influenza Age 9 to Adult  08/01/2019

## 2019-08-08 NOTE — PROGRESS NOTES
Request for blood pressure cuff has been submitted to [facility] 8/8/19. Patient made aware via voicemail message.

## 2019-08-08 NOTE — TELEPHONE ENCOUNTER
Patient was informed that Dr. Rosette Bonilla (OBGYN) does not accept her insurance, however Nithya Agudelo does. The patient reports that she was a previous patient of this practice but has not been seen in over a year. Patient states that she will call them and schedule an appointment. Patient was advised that if she needs us to send a referral to Nithya Agudelo to call the office. Patient was also informed that the previous Endocrinologist office she was referred to is in [place] and she states that office is too far. She did agree to go to [facility] in [place] and referral has been placed.

## 2019-08-12 ENCOUNTER — TELEPHONE (OUTPATIENT)
Dept: FAMILY MEDICINE CLINIC | Age: 34
End: 2019-08-12

## 2019-08-12 NOTE — TELEPHONE ENCOUNTER
Placed call to patient to discuss her request for a blood pressure monitoring kit. Left a message requesting the patient return call to [phone].

## 2019-08-13 ENCOUNTER — TELEPHONE (OUTPATIENT)
Dept: FAMILY MEDICINE CLINIC | Age: 34
End: 2019-08-13

## 2019-08-13 NOTE — TELEPHONE ENCOUNTER
Pt called and reports that she was unable to obtain blood pressure monitor through [facility]. Patient was advised that per [facility], she does have OTC benefits that cover a blood pressure monitor and [employer] is sending her a catalogue to place her order. She also wanted provider to be aware that she is currently 3 days later for her menstrual cycle and has been experiencing nausea, headache and breast tenderness. She denies sexual activity. Patient states that she has not scheduled her appointment with [email or web address] as advised on 8/8/19. Patient was advised to do so as soon as possible and if she is unable to get an appointment and symptoms persist she will need to call [facility] to schedule a visit with PCP. Patient verbalized understanding.

## 2019-08-14 ENCOUNTER — OFFICE VISIT (OUTPATIENT)
Dept: CARDIOLOGY CLINIC | Age: 34
End: 2019-08-14

## 2019-08-14 VITALS
WEIGHT: 172 LBS | OXYGEN SATURATION: 98 % | HEIGHT: 62 IN | DIASTOLIC BLOOD PRESSURE: 70 MMHG | BODY MASS INDEX: 31.65 KG/M2 | SYSTOLIC BLOOD PRESSURE: 128 MMHG | HEART RATE: 97 BPM

## 2019-08-14 DIAGNOSIS — I48.0 PAROXYSMAL ATRIAL FIBRILLATION (HCC): Primary | ICD-10-CM

## 2019-08-14 NOTE — PROGRESS NOTES
Baldomero Bhagat presents today for   Chief Complaint   Patient presents with    Irregular Heart Beat     follow up after sleep study - no cardiac complaints        Baldomero Bhagat preferred language for health care discussion is english/other. Is someone accompanying this pt? no    Is the patient using any DME equipment during [address]? no    Depression Screening:  3 most recent PHQ Screens 6/26/2019   Little interest or pleasure in doing things Not at all   Feeling down, depressed, irritable, or hopeless Not at all   Total Score PHQ 2 0       Learning Assessment:  Learning Assessment 3/13/2015   PRIMARY LEARNER Patient   HIGHEST LEVEL OF EDUCATION - PRIMARY LEARNER  2 YEARS [facility] PRIMARY LEARNER NONE   CO-LEARNER CAREGIVER No   PRIMARY LANGUAGE ENGLISH   LEARNER PREFERENCE PRIMARY DEMONSTRATION     -   ANSWERED BY patient   RELATIONSHIP SELF       Abuse Screening:  Abuse Screening Questionnaire 5/13/2015   Do you ever feel afraid of your partner? N   Are you in a relationship with someone who physically or mentally threatens you? N   Is it safe for you to go home? Y       Fall Risk  No flowsheet data found. Pt currently taking Anticoagulant therapy? no    Coordination of Care:  1. Have you been to the ER, urgent care clinic since your last visit? Hospitalized since your last visit? No    2. Have you seen or consulted any other health care providers outside of the [address] [place] [address] since your last visit? Include any pap smears or colon screening.  No

## 2019-08-14 NOTE — PROGRESS NOTES
Wally Gardner    Chief Complaint   Patient presents with    Irregular Heart Beat     follow up after sleep study - no cardiac complaints        HPI    Wally Gardner is a 37 y.o. young -American female with CKD, DM, here for follow-up of pAF and HTN, c/o SOB and palpitations. As you know patient used to follow with my late partner for nonvalvular paroxsymal atrial fibrillation that was diagnosed by event monitor ~2017. Despite being so young, she has a high enough CHADsVASC score (DM, HTN) and apparent history of recurrent TIA that he initiated renally dosed systemic anticoagulation for stroke prophylaxis. She follows with a kidney doctor for CKD. [facility] after being found down by her son. He said this was found to be due to her diabetes and she had severe hypoglycemia. During her hospitalization she had an echocardiogram 1/4/19 that noted normal LV function at 66%, significant valvular pathology and no effusions. Mitral valve leaflets was noted to be a bit sclerotic but it did not affect its mobility or function. Personally obtained and reviewed these records with patient during our encounter today. As you know she presents today for routine follow-up of her atrial fibrillation. Sounds like she does have paroxysms of this and is mostly asymptomatic but does notice that particular when she is laying down at night to go to sleep. We sent her for a sleep study and she was found to have insomnia but no SHIRLEY. Most recently she has complained of dysfunctional vaginal bleeding- 9-13 day menstrual cycles. She feels tired, headache, nauseated today in my office and said she started her period last night. Otherwise no CP, doesn't feel her heart racing. She takes Lasix as needed-     Cardiac RFs: DM (dx age 21, type 1 vs type 2?), HTN, HL, TIAs? Past Medical History:   Diagnosis Date    Cardiac echocardiogram 09/19/2016    [facility]:  Tech difficult.   Pt refused 2nd attempt at bubble study. EF 60%. No WMA. Mild conc LVH. RVSP 20 mmHg. No atrial shunting by Doppler.  Cardiovascular lower extremity venous duplex 06/20/2016    No DVT bilaterally.  Carotid duplex 09/19/2016    Mild < 50% bilateral ICA stenosis.  Cerebral artery occlusion with cerebral infarction ([facility] [address]     Diabetes ([facility]     Diabetic neuropathy ([facility]     Diabetic retinopathy     Hypercholesterolemia     Hypertension        Past Surgical History:   Procedure Laterality Date    HX ORTHOPAEDIC  January 2013    right toe nail surgery Dr. Laverne Roth       Current Outpatient Medications   Medication Sig Dispense Refill    ferrous sulfate 325 mg (65 mg iron) tablet Take 1 Tab by mouth daily. 30 Tab 2    oxybutynin (DITROPAN) 5 mg tablet Take 0.5 Tabs by mouth daily. 30 Tab 1    XARELTO 15 mg tab tablet TAKE 1 TABLET BY MOUTH DAILY 30 Tab 6    irbesartan (AVAPRO) 150 mg tablet TAKE 1 TABLET BY MOUTH DAILY FOR BLOOD PRESSURE 90 Tab 3    metoprolol tartrate (LOPRESSOR) 50 mg tablet TAKE 1 TABLET BY MOUTH TWICE DAILY 180 Tab 6    metroNIDAZOLE (METROGEL) 0.75 % topical gel Apply  to affected area two (2) times a day. 60 g 0    amLODIPine (NORVASC) 5 mg tablet Take 5 mg by mouth daily.  insulin pump (PATIENT SUPPLIED) misc by SubCUTAneous route as needed.  pregabalin (LYRICA) 75 mg capsule Take 1 Cap by mouth three (3) times daily. 90 Cap 3    sodium bicarbonate 650 mg tablet take 1 tablet by mouth 2 times a day  2    simvastatin (ZOCOR) 40 mg tablet TAKE 1 TABLET BY MOUTH NIGHTLY 30 Tab 6    furosemide (LASIX) 20 mg tablet Take as needed daily for leg swelling. (Patient taking differently: Take 20 mg by mouth as needed. Take as needed daily for leg swelling.) 30 Tab 3    insulin lispro (HUMALOG) 100 unit/mL injection Given via pump. Pump set every 90 days in provider's office.  Blood-Glucose Meter monitoring kit by Does Not Apply route two (2) times a day.  1 Kit 0    glucose blood VI test strips (ONE TOUCH ULTRA TEST) strip by Does Not Apply route. One touch ultra mini test strips 1 Package 11       Allergies   Allergen Reactions    Latex Hives    Contrast Agent [Iodine] Other (comments)     \"Burning with shooting pain\"    Macrobid [Nitrofurantoin Monohyd/M-Cryst] Diarrhea    Novolog Mix 70-30 [Insulin Asp Prt-Insulin Aspart] Nausea and Vomiting       Social History     Socioeconomic History    Marital status: SINGLE     Spouse name: Not on file    Number of children: Not on file    Years of education: Not on file    Highest education level: Not on file   Occupational History     Comment: applying for disability (diabetes)   Social Needs    Financial resource strain: Not on file    Food insecurity:     Worry: Not on file     Inability: Not on file    Transportation needs:     Medical: Not on file     Non-medical: Not on file   Tobacco Use    Smoking status: Never Smoker    Smokeless tobacco: Never Used   Substance and Sexual Activity    Alcohol use: No    Drug use: No    Sexual activity: Never   Lifestyle    Physical activity:     Days per week: Not on file     Minutes per session: Not on file    Stress: Not on file   Relationships    Social connections:     Talks on phone: Not on file     Gets together: Not on file     Attends Anabaptist service: Not on file     Active member of club or organization: Not on file     Attends meetings of clubs or organizations: Not on file     Relationship status: Not on file    Intimate partner violence:     Fear of current or ex partner: Not on file     Emotionally abused: Not on file     Physically abused: Not on file     Forced sexual activity: Not on file   Other Topics Concern    Not on file   Social History Narrative    Not on file        FH negative for premature CAD and SCD. Review of Systems    14 pt Review of Systems is negative unless otherwise mentioned in the HPI.     Wt Readings from Last 3 Encounters:   08/14/19 78 kg (172 lb) 08/08/19 80.3 kg (177 lb)   07/11/19 79.5 kg (175 lb 3.2 oz)     Temp Readings from Last 3 Encounters:   08/08/19 98.9 °F (37.2 °C) (Oral)   07/11/19 98.4 °F (36.9 °C) (Oral)   06/26/19 98.6 °F (37 °C) (Oral)     BP Readings from Last 3 Encounters:   08/14/19 128/70   08/08/19 (!) 136/92   07/22/19 125/80     Pulse Readings from Last 3 Encounters:   08/14/19 97   08/08/19 92   07/22/19 80     Physical Exam:    Visit Vitals  /70 (BP 1 Location: Left arm, BP Patient Position: Sitting)   Pulse 97   Ht 5' 2\" (1.575 m)   Wt 78 kg (172 lb)   SpO2 98%   BMI 31.46 kg/m²      Physical Exam   Constitutional: She is oriented to person, place, and time. She appears well-developed and well-nourished. HENT:   Head: Normocephalic and atraumatic. Eyes: Pupils are equal, round, and reactive to light. EOM are normal.   Neck: No JVD present. Cardiovascular: Normal rate, regular rhythm, normal heart sounds and intact distal pulses. Exam reveals no gallop and no friction rub. No murmur heard. Pulmonary/Chest: Effort normal and breath sounds normal. No respiratory distress. She has no wheezes. She has no rales. She exhibits no tenderness. Abdominal: Soft. Bowel sounds are normal.   Musculoskeletal: She exhibits no edema or tenderness. Neurological: She is alert and oriented to person, place, and time. Skin: Skin is warm and dry. Psychiatric: She has a normal mood and affect.        EKG today shows: NSR, normal axis and intervals, no ST segment abnormalities    Lab Results   Component Value Date/Time    TSH 2.140 07/11/2019 12:03 PM     Lab Results   Component Value Date/Time    Cholesterol, total 183 07/11/2019 12:03 PM    Cholesterol (POC) 267 01/25/2013 12:05 PM    HDL Cholesterol 48 07/11/2019 12:03 PM    HDL Cholesterol (POC) 37 01/25/2013 12:05 PM    LDL Cholesterol (POC) 185 01/25/2013 12:05 PM    LDL, calculated 114 (H) 07/11/2019 12:03 PM    VLDL, calculated 21 07/11/2019 12:03 PM    Triglyceride 103 07/11/2019 12:03 PM    Triglycerides (POC) 225 01/25/2013 12:05 PM    CHOL/HDL Ratio 5.2 (H) 02/09/2017 10:00 AM     Lab Results   Component Value Date/Time    Sodium 138 07/11/2019 12:03 PM    Potassium 5.3 (H) 07/11/2019 12:03 PM    Chloride 105 07/11/2019 12:03 PM    CO2 16 (L) 07/11/2019 12:03 PM    Anion gap 7 01/03/2019 09:20 AM    Glucose 195 (H) 07/11/2019 12:03 PM    BUN 34 (H) 07/11/2019 12:03 PM    Creatinine 2.39 (H) 07/11/2019 12:03 PM    BUN/Creatinine ratio 14 07/11/2019 12:03 PM    GFR est AA 29 (L) 07/11/2019 12:03 PM    GFR est non-AA 25 (L) 07/11/2019 12:03 PM    Calcium 8.8 07/11/2019 12:03 PM    Bilirubin, total <0.2 07/11/2019 12:03 PM    AST (SGOT) 14 07/11/2019 12:03 PM    Alk. phosphatase 98 07/11/2019 12:03 PM    Protein, total 6.8 07/11/2019 12:03 PM    Albumin 3.7 07/11/2019 12:03 PM    A-G Ratio 1.2 07/11/2019 12:03 PM    ALT (SGPT) 12 07/11/2019 12:03 PM       Impression and Plan:  Cedric Casas is a 37 y.o. with:    1.) nonvalvular pAF, currently NSR, ChadsVasc ~5  2.) diabetes, Type 1?  3.) HTN, avg SBP goal <140s, hovering above goal  4.) Hyperlipidemia, LDL almost >190!  5.) H/o TIAs/ CVA?  6.) CKD, known  7.) Normal LV function/ no structural heart disease based on echo 1/2019  8.) Obesity based on BMI 33  9.) acute on chronic anemia, with vaginal bleeding    1.) Continue rate control strategy with metoprolol 50 BID, seems to be doing well on current dose  2.) Continue Xarelto 15 daily for stroke ppx based on high chadsVasc  3.) Kindly defer bleeding/ anemia to PCP/ GYN  4.) RTC 6 months routine follow up of pAF, please call sooner if concerns    Thank you for allowing me to participate in the care of your patient, please do not hesitate to call with questions or concerns.     Kindest Regards,    Cornell Odom, DO

## 2019-08-29 ENCOUNTER — TELEPHONE (OUTPATIENT)
Dept: FAMILY MEDICINE CLINIC | Age: 34
End: 2019-08-29

## 2019-08-29 NOTE — TELEPHONE ENCOUNTER
Placed call to patient to advise that she is scheduled with Dr. Rothman Pouch office for 9/9/19 at 9:00 (arrival at 8:45) and that her appointment with Anthony Padilla on 9/9/19 has been cancelled as the provider will be out of the office. She will need to call to reschedule follow up with Anthony Padilla. A detailed message was left advising of this information.

## 2019-08-30 ENCOUNTER — HOSPITAL ENCOUNTER (OUTPATIENT)
Dept: ULTRASOUND IMAGING | Age: 34
Discharge: HOME | End: 2019-08-30
Attending: INTERNAL MEDICINE
Payer: MEDICARE

## 2019-08-30 ENCOUNTER — HOSPITAL ENCOUNTER (OUTPATIENT)
Dept: ULTRASOUND IMAGING | Age: 34
Discharge: HOME | End: 2019-08-30
Attending: NURSE PRACTITIONER
Payer: MEDICARE

## 2019-08-30 ENCOUNTER — DOCUMENTATION ONLY (OUTPATIENT)
Dept: FAMILY MEDICINE CLINIC | Age: 34
End: 2019-08-30

## 2019-08-30 DIAGNOSIS — M79.651 RIGHT THIGH PAIN: ICD-10-CM

## 2019-08-30 DIAGNOSIS — E04.1 THYROID NODULE: ICD-10-CM

## 2019-08-30 DIAGNOSIS — I48.0 PAROXYSMAL ATRIAL FIBRILLATION (HCC): ICD-10-CM

## 2019-08-30 PROCEDURE — 76882 US LMTD JT/FCL EVL NVASC XTR: CPT

## 2019-08-30 PROCEDURE — 76536 US EXAM OF HEAD AND NECK: CPT

## 2019-08-30 NOTE — PROGRESS NOTES
Office note and labs sent to [facility] location 8/30/19 for new patient consultation.

## 2019-09-12 ENCOUNTER — OFFICE VISIT (OUTPATIENT)
Dept: FAMILY MEDICINE CLINIC | Age: 34
End: 2019-09-12

## 2019-09-12 VITALS
WEIGHT: 174 LBS | BODY MASS INDEX: 32.02 KG/M2 | HEART RATE: 98 BPM | TEMPERATURE: 99 F | SYSTOLIC BLOOD PRESSURE: 132 MMHG | DIASTOLIC BLOOD PRESSURE: 81 MMHG | OXYGEN SATURATION: 98 % | HEIGHT: 62 IN | RESPIRATION RATE: 16 BRPM

## 2019-09-12 DIAGNOSIS — Z79.899 ENCOUNTER FOR LONG-TERM CURRENT USE OF HIGH RISK MEDICATION: ICD-10-CM

## 2019-09-12 DIAGNOSIS — E04.9 ENLARGED THYROID: ICD-10-CM

## 2019-09-12 DIAGNOSIS — Z79.4 TYPE 2 DIABETES MELLITUS WITH COMPLICATION, WITH LONG-TERM CURRENT USE OF INSULIN (HCC): Primary | ICD-10-CM

## 2019-09-12 DIAGNOSIS — M79.651 RIGHT THIGH PAIN: ICD-10-CM

## 2019-09-12 DIAGNOSIS — E11.8 TYPE 2 DIABETES MELLITUS WITH COMPLICATION, WITH LONG-TERM CURRENT USE OF INSULIN (HCC): Primary | ICD-10-CM

## 2019-09-12 RX ORDER — FLUCONAZOLE 150 MG/1
150 TABLET ORAL
Qty: 2 TAB | Refills: 0 | Status: SHIPPED | OUTPATIENT
Start: 2019-09-12 | End: 2019-09-20

## 2019-09-12 RX ORDER — OXYBUTYNIN CHLORIDE 5 MG/1
5 TABLET ORAL DAILY
Qty: 30 TAB | Refills: 2 | Status: SHIPPED | OUTPATIENT
Start: 2019-09-12 | End: 2020-03-09

## 2019-09-12 RX ORDER — PREGABALIN 75 MG/1
75 CAPSULE ORAL 3 TIMES DAILY
Qty: 90 CAP | Refills: 3 | Status: SHIPPED | OUTPATIENT
Start: 2019-09-12 | End: 2020-11-30 | Stop reason: SDUPTHER

## 2019-09-12 RX ORDER — OXYBUTYNIN CHLORIDE 5 MG/1
2.5 TABLET ORAL DAILY
Qty: 30 TAB | Refills: 1 | Status: SHIPPED | OUTPATIENT
Start: 2019-09-12 | End: 2019-09-12 | Stop reason: SDUPTHER

## 2019-09-12 NOTE — PATIENT INSTRUCTIONS
Goiter: Care Instructions  Your Care Instructions    A goiter is an enlarged thyroid gland. It can cause swelling in your neck. Your thyroid is found in the front of your neck. It makes a hormone that controls how your body uses energy. Goiters are caused by different things. Some are caused by high or low levels of thyroid hormone. Others are caused by too little iodine in the diet, or a growth or disease in the thyroid. In the [place], most goiters are caused by long-term autoimmune thyroiditis. This is also called Hashimoto's thyroiditis. It happens when the body's immune system damages the thyroid. You may take thyroid hormone to reduce the size of your goiter. Or you may need surgery or radioactive iodine treatment. Some people don't need any treatment. They only need to watch for changes in the goiter. Follow-up care is a key part of your treatment and safety. Be sure to make and go to all appointments, and call your doctor if you are having problems. It's also a good idea to know your test results and keep a list of the medicines you take. How can you care for yourself at home? · Be safe with medicines. Take your medicines exactly as prescribed. Call your doctor if you think you are having a problem with your medicine. You will get more details on the specific medicines your doctor prescribes. When should you call for help? Call 911 anytime you think you may need emergency care. For example, call if:    · You have trouble breathing.    Watch closely for changes in your health, and be sure to contact your doctor if:    · Your eyes turn red and bulge.     · You have trouble swallowing.     · You feel very tired or weak.     · You lose weight but are eating the same or more than usual.   Where can you learn more? Go to http://aram-klaus.info/. Enter R575 in the search box to learn more about \"Goiter: Care Instructions. \"  Current as of: November 6, 2018  Content Version: 12.1  © 9711-0178 [employer]. Care instructions adapted under license by [employer] (which disclaims liability or warranty for this information). If you have questions about a medical condition or this instruction, always ask your healthcare professional. [address] any warranty or liability for your use of this information.

## 2019-09-12 NOTE — PROGRESS NOTES
HISTORY OF PRESENT ILLNESS  Xavier Fosneca is a 37 y.o. female. Patients today to follow up for right thigh lump which has not changed. Ultrasound was negative. Further reports she needs refills on lyrica and oxybutynin. Patient states she missed her appt with new endocrinologist.  Reports she needed to replace her insulin pump batteries and had not worn her pump for at least a week. Allergies   Allergen Reactions    Latex Hives    Contrast Agent [Iodine] Other (comments)     \"Burning with shooting pain\"    Macrobid [Nitrofurantoin Monohyd/M-Cryst] Diarrhea    Novolog Mix 70-30 [Insulin Asp Prt-Insulin Aspart] Nausea and Vomiting     Current Outpatient Medications   Medication Sig Dispense Refill    oxybutynin (DITROPAN) 5 mg tablet Take 0.5 Tabs by mouth daily. 30 Tab 1    XARELTO 15 mg tab tablet TAKE 1 TABLET BY MOUTH DAILY 30 Tab 6    irbesartan (AVAPRO) 150 mg tablet TAKE 1 TABLET BY MOUTH DAILY FOR BLOOD PRESSURE 90 Tab 3    metoprolol tartrate (LOPRESSOR) 50 mg tablet TAKE 1 TABLET BY MOUTH TWICE DAILY 180 Tab 6    metroNIDAZOLE (METROGEL) 0.75 % topical gel Apply  to affected area two (2) times a day. 60 g 0    amLODIPine (NORVASC) 5 mg tablet Take 5 mg by mouth daily.  insulin pump (PATIENT SUPPLIED) misc by SubCUTAneous route as needed.  pregabalin (LYRICA) 75 mg capsule Take 1 Cap by mouth three (3) times daily. 90 Cap 3    sodium bicarbonate 650 mg tablet take 1 tablet by mouth 2 times a day  2    simvastatin (ZOCOR) 40 mg tablet TAKE 1 TABLET BY MOUTH NIGHTLY 30 Tab 6    furosemide (LASIX) 20 mg tablet Take as needed daily for leg swelling. (Patient taking differently: Take 20 mg by mouth as needed. Take as needed daily for leg swelling.) 30 Tab 3    insulin lispro (HUMALOG) 100 unit/mL injection Given via pump. Pump set every 90 days in provider's office.  Blood-Glucose Meter monitoring kit by Does Not Apply route two (2) times a day.  1 Kit 0    glucose blood VI test strips (ONE TOUCH ULTRA TEST) strip by Does Not Apply route. One touch ultra mini test strips 1 Package 11    ferrous sulfate 325 mg (65 mg iron) tablet Take 1 Tab by mouth daily. 30 Tab 2     Past Medical History:   Diagnosis Date    Cardiac echocardiogram 09/19/2016    [facility]:  Tech difficult. Pt refused 2nd attempt at bubble study. EF 60%. No WMA. Mild conc LVH. RVSP 20 mmHg. No atrial shunting by Doppler.  Cardiovascular lower extremity venous duplex 06/20/2016    No DVT bilaterally.  Carotid duplex 09/19/2016    Mild < 50% bilateral ICA stenosis.     [facility]erebral [facility]     Diabetes [facility]     Diabetic neuropathy ([facility]     Diabetic retinopathy     Hypercholesterolemia     Hypertension      Social History     Socioeconomic History    Marital status: SINGLE     Spouse name: Not on file    Number of children: Not on file    Years of education: Not on file    Highest education level: Not on file   Occupational History     Comment: applying for disability (diabetes)   Social Needs    Financial resource strain: Not on file    Food insecurity:     Worry: Not on file     Inability: Not on file    Transportation needs:     Medical: Not on file     Non-medical: Not on file   Tobacco Use    Smoking status: Never Smoker    Smokeless tobacco: Never Used   Substance and Sexual Activity    Alcohol use: No    Drug use: No    Sexual activity: Never   Lifestyle    Physical activity:     Days per week: Not on file     Minutes per session: Not on file    Stress: Not on file   Relationships    Social connections:     Talks on phone: Not on file     Gets together: Not on file     Attends Evangelical service: Not on file     Active member of club or organization: Not on file     Attends meetings of clubs or organizations: Not on file     Relationship status: Not on file    Intimate partner violence:     Fear of current or ex partner: Not on file     Emotionally abused: Not on file     Physically abused: Not on file     Forced sexual activity: Not on file   Other Topics Concern    Not on file   Social History Narrative    Not on file     Wt Readings from Last 3 Encounters:   09/12/19 174 lb (78.9 kg)   08/14/19 172 lb (78 kg)   08/08/19 177 lb (80.3 kg)     BP Readings from Last 3 Encounters:   09/12/19 132/81   08/14/19 128/70   08/08/19 (!) 136/92     Review of Systems   Constitutional: Negative for chills and fever. Respiratory: Negative for shortness of breath. Cardiovascular: Negative for chest pain and palpitations. Neurological: Negative for dizziness and headaches. /81 (BP 1 Location: Left arm, BP Patient Position: Sitting)   Pulse 98   Temp 99 °F (37.2 °C) (Oral)   Resp 16   Ht 5' 2\" (1.575 m)   Wt 174 lb (78.9 kg)   LMP 09/09/2019 (Exact Date)   SpO2 98%   BMI 31.83 kg/m²      Physical Exam   Constitutional: She is oriented to person, place, and time. She appears well-developed and well-nourished. HENT:   Head: Normocephalic and atraumatic. Neck: Normal range of motion. Neck supple. Cardiovascular: Normal rate, regular rhythm and normal heart sounds. Exam reveals no gallop and no friction rub. No murmur heard. Pulmonary/Chest: Effort normal and breath sounds normal. She has no wheezes. She has no rhonchi. She has no rales. Neurological: She is alert and oriented to person, place, and time. Skin: Skin is warm and dry. ASSESSMENT and PLAN    ICD-10-CM ICD-9-CM    1. Type 2 diabetes mellitus with complication, with long-term current use of insulin (HCC) E11.8 250.90 pregabalin (LYRICA) 75 mg capsule    Z79.4 V58.67    2. Encounter for long-term current use of high risk medication Z79.899 V58.69 COMPLIANCE DRUG SCREEN/PRESCRIPTION MONITORING   3. Enlarged thyroid E04.9 240.9    4.  Right thigh pain M79.651 729.5      Orders Placed This Encounter    COMPLIANCE DRUG SCREEN/PRESCRIPTION MONITORING    pregabalin (LYRICA) 75 mg capsule    DISCONTD: oxybutynin (DITROPAN) 5 mg tablet    oxybutynin (DITROPAN) 5 mg tablet    fluconazole (DIFLUCAN) 150 mg tablet     I have reviewed the patients controlled substance prescription history, as maintained in the [facility]. There is no suspicious activity noted. Usage is consistent with current use of prescribed medications. Patient medication usage agreement for controlled substances signed see scanned copy  Urine drug screen was collected today. Last dosage of medication was 1 days ago. Informed will need to reschedule appt with endocrinologist.  I have discussed the diagnosis with the patient and the intended plan as seen in the above orders. The patient has received an after-visit summary and questions were answered concerning future plans. I have discussed medication side effects and warnings with the patient as well. Patient agreeable with above plan and verbalizes understanding. Follow-up and Dispositions    · Return in about 3 months (around 12/12/2019) for DM/HLD/HTN.

## 2019-09-12 NOTE — PROGRESS NOTES
Baldomero Bhagat presents today for   Chief Complaint   Patient presents with    Follow-up     thigh bruise        Is someone accompanying this pt? no    Is the patient using any DME equipment during OV? no    Depression Screening:  3 most recent PHQ Screens 6/26/2019   Little interest or pleasure in doing things Not at all   Feeling down, depressed, irritable, or hopeless Not at all   Total Score PHQ 2 0       Learning Assessment:  Learning Assessment 3/13/2015   PRIMARY LEARNER Patient   HIGHEST LEVEL OF EDUCATION - PRIMARY LEARNER  2 YEARS [place] PRIMARY LEARNER NONE   CO-LEARNER CAREGIVER No   PRIMARY LANGUAGE ENGLISH   LEARNER PREFERENCE PRIMARY DEMONSTRATION     -   ANSWERED BY patient   RELATIONSHIP SELF       Abuse Screening:  Abuse Screening Questionnaire 5/13/2015   Do you ever feel afraid of your partner? N   Are you in a relationship with someone who physically or mentally threatens you? N   Is it safe for you to go home? Y       Fall Risk  No flowsheet data found. Health Maintenance reviewed and discussed and ordered per Provider. Health Maintenance Due   Topic Date Due    Pneumococcal 0-64 years (1 of 3 - PCV13) 04/08/1989    Influenza Age 5 to Adult  08/01/2019           Coordination of Care:  1. Have you been to the ER, urgent care clinic since your last visit? Hospitalized since your last visit? no    2. Have you seen or consulted any other health care providers outside of the [address] [place] [address] since your last visit? Include any pap smears or colon screening.  no

## 2019-09-15 DIAGNOSIS — E78.00 PURE HYPERCHOLESTEROLEMIA: ICD-10-CM

## 2019-09-15 RX ORDER — SIMVASTATIN 40 MG/1
TABLET, FILM COATED ORAL
Qty: 30 TAB | Refills: 0 | OUTPATIENT
Start: 2019-09-15

## 2019-09-16 RX ORDER — SIMVASTATIN 40 MG/1
TABLET, FILM COATED ORAL
Qty: 30 TAB | Refills: 6 | Status: SHIPPED | OUTPATIENT
Start: 2019-09-16 | End: 2020-09-21 | Stop reason: SDUPTHER

## 2019-10-04 ENCOUNTER — TELEPHONE (OUTPATIENT)
Dept: FAMILY MEDICINE CLINIC | Age: 34
End: 2019-10-04

## 2019-10-04 NOTE — TELEPHONE ENCOUNTER
Pt reports that she is experiencing a tingling feeling with urination, urgency and odor during urination as well as lower back pain. She reports symptoms have been present for the last two days. Pt scheduled for 10/5/19. Pt also would like to note that she is scheduled with endocrinology 10/31/19.

## 2019-10-07 ENCOUNTER — OFFICE VISIT (OUTPATIENT)
Dept: FAMILY MEDICINE CLINIC | Age: 34
End: 2019-10-07

## 2019-10-07 VITALS
DIASTOLIC BLOOD PRESSURE: 80 MMHG | RESPIRATION RATE: 16 BRPM | HEIGHT: 62 IN | TEMPERATURE: 98.3 F | SYSTOLIC BLOOD PRESSURE: 188 MMHG | OXYGEN SATURATION: 98 % | WEIGHT: 181 LBS | BODY MASS INDEX: 33.31 KG/M2 | HEART RATE: 74 BPM

## 2019-10-07 DIAGNOSIS — R30.0 DYSURIA: Primary | ICD-10-CM

## 2019-10-07 DIAGNOSIS — Z23 ENCOUNTER FOR IMMUNIZATION: ICD-10-CM

## 2019-10-07 LAB
BILIRUB UR QL STRIP: NEGATIVE
GLUCOSE UR-MCNC: NORMAL MG/DL
KETONES P FAST UR STRIP-MCNC: NEGATIVE MG/DL
PH UR STRIP: 7.5 [PH] (ref 4.6–8)
PROT UR QL STRIP: NORMAL
SP GR UR STRIP: 1.02 (ref 1–1.03)
UA UROBILINOGEN AMB POC: NORMAL (ref 0.2–1)
URINALYSIS CLARITY POC: CLEAR
URINALYSIS COLOR POC: YELLOW
URINE BLOOD POC: NORMAL
URINE LEUKOCYTES POC: NEGATIVE
URINE NITRITES POC: NEGATIVE

## 2019-10-07 RX ORDER — SULFAMETHOXAZOLE AND TRIMETHOPRIM 800; 160 MG/1; MG/1
1 TABLET ORAL 2 TIMES DAILY
Qty: 6 TAB | Refills: 0 | Status: SHIPPED | OUTPATIENT
Start: 2019-10-07 | End: 2019-10-10

## 2019-10-07 NOTE — PROGRESS NOTES
HISTORY OF PRESENT ILLNESS  Parish Carmona is a 37 y.o. female. States for the last week she has been having lower back pain and urinary frequency, urgency, and dysuria. Admits to having unprotected intercourse 2 weeks ago. Denies vaginal discharge. Allergies   Allergen Reactions    Latex Hives    Contrast Agent [Iodine] Other (comments)     \"Burning with shooting pain\"    Macrobid [Nitrofurantoin Monohyd/M-Cryst] Diarrhea    Novolog Mix 70-30 [Insulin Asp Prt-Insulin Aspart] Nausea and Vomiting     Current Outpatient Medications   Medication Sig Dispense Refill    simvastatin (ZOCOR) 40 mg tablet TAKE 1 TABLET BY MOUTH NIGHTLY 30 Tab 6    pregabalin (LYRICA) 75 mg capsule Take 1 Cap by mouth three (3) times daily. 90 Cap 3    oxybutynin (DITROPAN) 5 mg tablet Take 1 Tab by mouth daily. 30 Tab 2    ferrous sulfate 325 mg (65 mg iron) tablet Take 1 Tab by mouth daily. 30 Tab 2    XARELTO 15 mg tab tablet TAKE 1 TABLET BY MOUTH DAILY 30 Tab 6    irbesartan (AVAPRO) 150 mg tablet TAKE 1 TABLET BY MOUTH DAILY FOR BLOOD PRESSURE 90 Tab 3    metoprolol tartrate (LOPRESSOR) 50 mg tablet TAKE 1 TABLET BY MOUTH TWICE DAILY 180 Tab 6    metroNIDAZOLE (METROGEL) 0.75 % topical gel Apply  to affected area two (2) times a day. 60 g 0    amLODIPine (NORVASC) 5 mg tablet Take 5 mg by mouth daily.  insulin pump (PATIENT SUPPLIED) misc by SubCUTAneous route as needed.  sodium bicarbonate 650 mg tablet take 1 tablet by mouth 2 times a day  2    furosemide (LASIX) 20 mg tablet Take as needed daily for leg swelling. (Patient taking differently: Take 20 mg by mouth as needed. Take as needed daily for leg swelling.) 30 Tab 3    insulin lispro (HUMALOG) 100 unit/mL injection Given via pump. Pump set every 90 days in provider's office.  Blood-Glucose Meter monitoring kit by Does Not Apply route two (2) times a day.  1 Kit 0    glucose blood VI test strips (ONE TOUCH ULTRA TEST) strip by Does Not Apply route. One touch ultra mini test strips 1 Package 11     Past Medical History:   Diagnosis Date    Cardiac echocardiogram 09/19/2016    [facility]:  Tech difficult. Pt refused 2nd attempt at bubble study. EF 60%. No WMA. Mild conc LVH. RVSP 20 mmHg. No atrial shunting by Doppler.  Cardiovascular lower extremity venous duplex 06/20/2016    No DVT bilaterally.  Carotid duplex 09/19/2016    Mild < 50% bilateral ICA stenosis.     [facility]     Diabetes [facility]     Diabetic neuropathy ([facility]     Diabetic retinopathy     Hypercholesterolemia     Hypertension      Social History     Socioeconomic History    Marital status: SINGLE     Spouse name: Not on file    Number of children: Not on file    Years of education: Not on file    Highest education level: Not on file   Occupational History     Comment: applying for disability (diabetes)   Social Needs    Financial resource strain: Not on file    Food insecurity:     Worry: Not on file     Inability: Not on file    Transportation needs:     Medical: Not on file     Non-medical: Not on file   Tobacco Use    Smoking status: Never Smoker    Smokeless tobacco: Never Used   Substance and Sexual Activity    Alcohol use: No    Drug use: No    Sexual activity: Never   Lifestyle    Physical activity:     Days per week: Not on file     Minutes per session: Not on file    Stress: Not on file   Relationships    Social connections:     Talks on phone: Not on file     Gets together: Not on file     Attends Restorationism service: Not on file     Active member of club or organization: Not on file     Attends meetings of clubs or organizations: Not on file     Relationship status: Not on file    Intimate partner violence:     Fear of current or ex partner: Not on file     Emotionally abused: Not on file     Physically abused: Not on file     Forced sexual activity: Not on file   Other Topics Concern    Not on file Social History Narrative    Not on file     Wt Readings from Last 3 Encounters:   10/07/19 181 lb (82.1 kg)   09/12/19 174 lb (78.9 kg)   08/14/19 172 lb (78 kg)     BP Readings from Last 3 Encounters:   10/07/19 (!) 198/103   09/12/19 132/81   08/14/19 128/70     Review of Systems   Constitutional: Negative for chills and fever. Respiratory: Negative for shortness of breath. Cardiovascular: Negative for chest pain and palpitations. Gastrointestinal: Negative for abdominal pain. Genitourinary: Positive for dysuria, flank pain, frequency and urgency. Neurological: Negative for dizziness and headaches. /80 (BP 1 Location: Right arm, BP Patient Position: Sitting)   Pulse 74   Temp 98.3 °F (36.8 °C) (Oral)   Resp 16   Ht 5' 2\" (1.575 m)   Wt 181 lb (82.1 kg)   LMP 09/09/2019 (Exact Date)   SpO2 98%   BMI 33.11 kg/m²      Physical Exam   Constitutional: She is oriented to person, place, and time. She appears well-developed and well-nourished. HENT:   Head: Normocephalic and atraumatic. Neck: Normal range of motion. Neck supple. Cardiovascular: Normal rate, regular rhythm and normal heart sounds. Exam reveals no gallop and no friction rub. No murmur heard. Pulmonary/Chest: Effort normal and breath sounds normal. She has no wheezes. She has no rhonchi. She has no rales. Abdominal: Soft. Bowel sounds are normal. There is no tenderness. There is no CVA tenderness. Neurological: She is alert and oriented to person, place, and time. Skin: Skin is warm and dry. ASSESSMENT and PLAN    ICD-10-CM ICD-9-CM    1. Dysuria R30.0 788.1 AMB POC URINALYSIS DIP STICK AUTO W/O MICRO      CULTURE, URINE   2.  Encounter for immunization Z23 V03.89 INFLUENZA VIRUS VAC QUAD,SPLIT,PRESV FREE SYRINGE IM       Orders Placed This Encounter    CULTURE, URINE    CULTURE, URINE    Influenza virus vaccine (QUADRIVALENT PRES FREE SYRINGE) IM ([id number])    AMB POC URINALYSIS DIP STICK AUTO W/O MICRO  trimethoprim-sulfamethoxazole (BACTRIM DS, SEPTRA DS) 160-800 mg per tablet     I have discussed the diagnosis with the patient and the intended plan as seen in the above orders. The patient has received an after-visit summary and questions were answered concerning future plans. I have discussed medication side effects and warnings with the patient as well. Patient agreeable with above plan and verbalizes understanding. Follow-up and Dispositions    · Return if symptoms worsen or fail to improve.

## 2019-10-07 NOTE — PATIENT INSTRUCTIONS
Painful Urination (Dysuria): Care Instructions  Your Care Instructions  Burning pain with urination (dysuria) is a common symptom of a urinary tract infection or other urinary problems. The bladder may become inflamed. This can cause pain when the bladder fills and empties. You may also feel pain if the tube that carries urine from the bladder to the outside of the body (urethra) gets irritated or infected. Sexually transmitted infections (STIs) also may cause pain when you urinate. Sometimes the pain can be caused by things other than an infection. The urethra can be irritated by soaps, perfumes, or foreign objects in the urethra. Kidney stones can cause pain when they pass through the urethra. The cause may be hard to find. You may need tests. Treatment for painful urination depends on the cause. Follow-up care is a key part of your treatment and safety. Be sure to make and go to all appointments, and call your doctor if you are having problems. It's also a good idea to know your test results and keep a list of the medicines you take. How can you care for yourself at home? · Drink extra water for the next day or two. This will help make the urine less concentrated. (If you have kidney, heart, or liver disease and have to limit fluids, talk with your doctor before you increase the amount of fluids you drink.)  · Avoid drinks that are carbonated or have caffeine. They can irritate the bladder. · Urinate often. Try to empty your bladder each time. For women:  · Urinate right after you have sex. · After going to the bathroom, wipe from front to back. · Avoid douches, bubble baths, and feminine hygiene sprays. And avoid other feminine hygiene products that have deodorants. When should you call for help? Call your doctor now or seek immediate medical care if:    · You have new symptoms, such as fever, nausea, or vomiting.     · You have new or worse symptoms of a urinary problem. For example:  ?  You have blood or pus in your urine. ? You have chills or body aches. ? It hurts worse to urinate. ? You have groin or belly pain. ? You have pain in your back just below your rib cage (the flank area).    Watch closely for changes in your health, and be sure to contact your doctor if you have any problems. Where can you learn more? Go to http://aram-klaus.info/. Enter Q075 in the search box to learn more about \"Painful Urination (Dysuria): Care Instructions. \"  Current as of: December 19, 2018  Content Version: 12.2  © 5845-7028 [employer]. Care instructions adapted under license by [employer] (which disclaims liability or warranty for this information). If you have questions about a medical condition or this instruction, always ask your healthcare professional. [address] any warranty or liability for your use of this information.

## 2019-10-07 NOTE — PROGRESS NOTES
Betty [facility] presents today for   Chief Complaint   Patient presents with    Urinary Pain       Is someone accompanying this pt? no    Is the patient using any DME equipment during OV? no    Depression Screening:  3 most recent PHQ Screens 6/26/2019   Little interest or pleasure in doing things Not at all   Feeling down, depressed, irritable, or hopeless Not at all   Total Score PHQ 2 0       Learning Assessment:  Learning Assessment 3/13/2015   PRIMARY LEARNER Patient   HIGHEST LEVEL OF EDUCATION - PRIMARY LEARNER  [facility] PRIMARY LEARNER NONE   CO-LEARNER CAREGIVER No   PRIMARY LANGUAGE ENGLISH   LEARNER PREFERENCE PRIMARY DEMONSTRATION     -   ANSWERED BY patient   RELATIONSHIP SELF       Abuse Screening:  Abuse Screening Questionnaire 5/13/2015   Do you ever feel afraid of your partner? N   Are you in a relationship with someone who physically or mentally threatens you? N   Is it safe for you to go home? Y       Fall Risk  No flowsheet data found. Health Maintenance reviewed and discussed and ordered per Provider. Health Maintenance Due   Topic Date Due    Pneumococcal 0-64 years (1 of 3 - PCV13) 04/08/1989    Influenza Age 5 to Adult  08/01/2019           Coordination of Care:  1. Have you been to the ER, urgent care clinic since your last visit? Hospitalized since your last visit? no    2. Have you seen or consulted any other health care providers outside of the [address] [place] [address] since your last visit? Include any pap smears or colon screening.  no

## 2019-10-08 NOTE — PROGRESS NOTES
Verbal order for administration of routine immunization from [place], NP-C. Read back and verified with provider. Obtained necessary consent from patient and/or legal guardian. Patient received Flu vaccine in left deltoid. Observed for 5 minutes after administration. Patient tolerated well and left without complaints. Current Vaccine Information Sheet given.

## 2019-10-09 LAB — BACTERIA UR CULT: NORMAL

## 2019-10-31 ENCOUNTER — TELEPHONE (OUTPATIENT)
Dept: FAMILY MEDICINE CLINIC | Age: 34
End: 2019-10-31

## 2019-10-31 LAB — HBA1C MFR BLD HPLC: 8.9 %

## 2019-10-31 NOTE — TELEPHONE ENCOUNTER
Pt calls to request medication for a yeast infection. She reports itching to the vaginal area and white discharge, she denies any odor. Pt reports symptoms have been present for 1 week.

## 2019-11-05 RX ORDER — FLUCONAZOLE 150 MG/1
150 TABLET ORAL
Qty: 3 TAB | Refills: 0 | Status: SHIPPED | OUTPATIENT
Start: 2019-11-05 | End: 2019-11-12

## 2019-11-05 NOTE — TELEPHONE ENCOUNTER
Placed call to patient to advise that medication has been sent to pharmacy on record. A non-detailed message was left.

## 2019-11-12 ENCOUNTER — TELEPHONE (OUTPATIENT)
Dept: FAMILY MEDICINE CLINIC | Age: 34
End: 2019-11-12

## 2019-11-12 NOTE — TELEPHONE ENCOUNTER
Pt calls and reports that she has been noticing that her blood pressure readings have been elevated. She reports a reading of 178/119 on 11/11/19 after medication and today 189/156(morning) and 167/115 (prior to calling office)after medication. She states that this has been going on for the last month or two. She is taking her blood pressure with a electronic wrist bp monitor. Pt reports that when she was at endocrinology two weeks ago they took a manual bp and it was not elevated at that time. She does report having \"a lot going on\" in her life that may be contributing to her bp. Patient reports that she is compliant with all medications. She does add salt to her food sometimes. She also reports feeling pressure and pounding sensation in her head this morning. She denies feeling this right now. She also denies numbness/tingling, chest pain, SOB, lightheadedness, weakness to extremities and not difficulty with speech noted. Patient was advised to contact her Cardiologist in regards to the elevated readings to see if she can come in for evaluation. Patient was also advised To try her electric arm bp monitor to see if she has the same readings. She was advised that if bp continues to be elevated to present to the ED for evaluation as she does have a history of stroke. Patient verbalized understanding.

## 2019-11-14 ENCOUNTER — OFFICE VISIT (OUTPATIENT)
Dept: CARDIOLOGY CLINIC | Age: 34
End: 2019-11-14

## 2019-11-14 ENCOUNTER — TELEPHONE (OUTPATIENT)
Dept: FAMILY MEDICINE CLINIC | Age: 34
End: 2019-11-14

## 2019-11-14 VITALS
DIASTOLIC BLOOD PRESSURE: 86 MMHG | WEIGHT: 182 LBS | BODY MASS INDEX: 33.49 KG/M2 | OXYGEN SATURATION: 98 % | HEIGHT: 62 IN | HEART RATE: 80 BPM | SYSTOLIC BLOOD PRESSURE: 144 MMHG

## 2019-11-14 DIAGNOSIS — I48.0 PAROXYSMAL ATRIAL FIBRILLATION (HCC): ICD-10-CM

## 2019-11-14 DIAGNOSIS — I10 ESSENTIAL HYPERTENSION WITH GOAL BLOOD PRESSURE LESS THAN 130/80: Primary | ICD-10-CM

## 2019-11-14 DIAGNOSIS — E78.2 MIXED HYPERLIPIDEMIA: ICD-10-CM

## 2019-11-14 RX ORDER — FUROSEMIDE 20 MG/1
20 TABLET ORAL
Qty: 30 TAB | Refills: 3 | Status: SHIPPED | OUTPATIENT
Start: 2019-11-14 | End: 2020-05-17

## 2019-11-14 NOTE — PROGRESS NOTES
Sofía Emanuel    Chief Complaint   Patient presents with    Follow-up     concerned about bp   Urgent appt for HTN urgency    HPI    Sofía Emanuel is a 37 y.o. young -American female with CKD, DM, here for follow-up of pAF and HTN, c/o SOB and palpitations. As you know patient used to follow with my late partner for nonvalvular paroxsymal atrial fibrillation that was diagnosed by event monitor ~2017. Despite being so young, she has a high enough CHADsVASC score (DM, HTN) and apparent history of recurrent TIA that he initiated renally dosed systemic anticoagulation for stroke prophylaxis. She follows with a kidney doctor for CKD. At [facility] after being found down by her son. He said this was found to be due to her diabetes and she had severe hypoglycemia. During her hospitalization she had an echocardiogram 1/4/19 that noted normal LV function at 66%, significant valvular pathology and no effusions. Mitral valve leaflets was noted to be a bit sclerotic but it did not affect its mobility or function. Personally obtained and reviewed these records with patient during our encounter today. she does have paroxysms of pAF and is mostly asymptomatic but does notice that particular when she is laying down at night to go to sleep. We sent her for a sleep study and she was found to have insomnia but no SHIRLEY. Otherwise no CP, doesn't feel her heart racing. She takes Lasix as needed- last took a couple days ago. She comes in today after calling our office and speaking to my nurse. Says her SBP was 170-180s on several occassions on her wrist monitor. She felt \"poorly\" at the time. She called her PCP office who asked her to call us. She is under a lot of stress, doesn't sleep well, and c/o headaches. Cardiac RFs: DM (dx age 21, type 1 vs type 2?), HTN, HL, TIAs? Past Medical History:   Diagnosis Date    Cardiac echocardiogram 09/19/2016    [facility]:  Tech difficult.   Pt refused 2nd attempt at bubble study. EF 60%. No WMA. Mild conc LVH. RVSP 20 mmHg. No atrial shunting by Doppler.  Cardiovascular lower extremity venous duplex 06/20/2016    No DVT bilaterally.  Carotid duplex 09/19/2016    Mild < 50% bilateral ICA stenosis.  Cerebral [facility]     Diabetes [facility]     Diabetic neuropathy [facility] [address] [facility]     Diabetic retinopathy     Hypercholesterolemia     Hypertension        Past Surgical History:   Procedure Laterality Date    HX ORTHOPAEDIC  January 2013    right toe nail surgery Dr. Mario Sicard       Current Outpatient Medications   Medication Sig Dispense Refill    simvastatin (ZOCOR) 40 mg tablet TAKE 1 TABLET BY MOUTH NIGHTLY 30 Tab 6    pregabalin (LYRICA) 75 mg capsule Take 1 Cap by mouth three (3) times daily. 90 Cap 3    oxybutynin (DITROPAN) 5 mg tablet Take 1 Tab by mouth daily. 30 Tab 2    ferrous sulfate 325 mg (65 mg iron) tablet Take 1 Tab by mouth daily. 30 Tab 2    XARELTO 15 mg tab tablet TAKE 1 TABLET BY MOUTH DAILY 30 Tab 6    irbesartan (AVAPRO) 150 mg tablet TAKE 1 TABLET BY MOUTH DAILY FOR BLOOD PRESSURE 90 Tab 3    metoprolol tartrate (LOPRESSOR) 50 mg tablet TAKE 1 TABLET BY MOUTH TWICE DAILY 180 Tab 6    metroNIDAZOLE (METROGEL) 0.75 % topical gel Apply  to affected area two (2) times a day. 60 g 0    amLODIPine (NORVASC) 5 mg tablet Take 5 mg by mouth daily.  insulin pump (PATIENT SUPPLIED) misc by SubCUTAneous route as needed.  sodium bicarbonate 650 mg tablet take 1 tablet by mouth 2 times a day  2    furosemide (LASIX) 20 mg tablet Take as needed daily for leg swelling. (Patient taking differently: Take 20 mg by mouth as needed. Take as needed daily for leg swelling.) 30 Tab 3    insulin lispro (HUMALOG) 100 unit/mL injection Given via pump. Pump set every 90 days in provider's office.  Blood-Glucose Meter monitoring kit by Does Not Apply route two (2) times a day.  1 Kit 0    glucose blood VI test strips (ONE TOUCH ULTRA TEST) strip by Does Not Apply route. One touch ultra mini test strips 1 Package 11       Allergies   Allergen Reactions    Latex Hives    Contrast Agent [Iodine] Other (comments)     \"Burning with shooting pain\"    Macrobid [Nitrofurantoin Monohyd/M-Cryst] Diarrhea    Novolog Mix 70-30 [Insulin Asp Prt-Insulin Aspart] Nausea and Vomiting       Social History     Socioeconomic History    Marital status: SINGLE     Spouse name: Not on file    Number of children: Not on file    Years of education: Not on file    Highest education level: Not on file   Occupational History     Comment: applying for disability (diabetes)   Social Needs    Financial resource strain: Not on file    Food insecurity:     Worry: Not on file     Inability: Not on file    Transportation needs:     Medical: Not on file     Non-medical: Not on file   Tobacco Use    Smoking status: Never Smoker    Smokeless tobacco: Never Used   Substance and Sexual Activity    Alcohol use: No    Drug use: No    Sexual activity: Never   Lifestyle    Physical activity:     Days per week: Not on file     Minutes per session: Not on file    Stress: Not on file   Relationships    Social connections:     Talks on phone: Not on file     Gets together: Not on file     Attends Mosque service: Not on file     Active member of club or organization: Not on file     Attends meetings of clubs or organizations: Not on file     Relationship status: Not on file    Intimate partner violence:     Fear of current or ex partner: Not on file     Emotionally abused: Not on file     Physically abused: Not on file     Forced sexual activity: Not on file   Other Topics Concern    Not on file   Social History Narrative    Not on file        FH negative for premature CAD and SCD. Review of Systems    14 pt Review of Systems is negative unless otherwise mentioned in the HPI.     Wt Readings from Last 3 Encounters:   10/07/19 82.1 kg (181 lb)   09/12/19 78.9 kg (174 lb)   08/14/19 78 kg (172 lb)     Temp Readings from Last 3 Encounters:   10/07/19 98.3 °F (36.8 °C) (Oral)   09/12/19 99 °F (37.2 °C) (Oral)   08/08/19 98.9 °F (37.2 °C) (Oral)     BP Readings from Last 3 Encounters:   10/07/19 188/80   09/12/19 132/81   08/14/19 128/70     Pulse Readings from Last 3 Encounters:   10/07/19 74   09/12/19 98   08/14/19 97     Physical Exam:    Visit Vitals  Ht 5' 2\" (1.575 m)   BMI 33.11 kg/m²      Physical Exam   Constitutional: She is oriented to person, place, and time. She appears well-developed and well-nourished. HENT:   Head: Normocephalic and atraumatic. Eyes: Pupils are equal, round, and reactive to light. EOM are normal.   Neck: No JVD present. Cardiovascular: Normal rate, regular rhythm, normal heart sounds and intact distal pulses. Exam reveals no gallop and no friction rub. No murmur heard. Pulmonary/Chest: Effort normal and breath sounds normal. No respiratory distress. She has no wheezes. She has no rales. She exhibits no tenderness. Abdominal: Soft. Bowel sounds are normal.   Musculoskeletal: She exhibits no edema or tenderness. Neurological: She is alert and oriented to person, place, and time. Skin: Skin is warm and dry. Psychiatric: She has a normal mood and affect.        EKG last: NSR, normal axis and intervals, no ST segment abnormalities    Lab Results   Component Value Date/Time    TSH 2.140 07/11/2019 12:03 PM     Lab Results   Component Value Date/Time    Cholesterol, total 183 07/11/2019 12:03 PM    Cholesterol (POC) 267 01/25/2013 12:05 PM    HDL Cholesterol 48 07/11/2019 12:03 PM    HDL Cholesterol (POC) 37 01/25/2013 12:05 PM    LDL Cholesterol (POC) 185 01/25/2013 12:05 PM    LDL, calculated 114 (H) 07/11/2019 12:03 PM    VLDL, calculated 21 07/11/2019 12:03 PM    Triglyceride 103 07/11/2019 12:03 PM    Triglycerides (POC) 225 01/25/2013 12:05 PM    CHOL/HDL Ratio 5.2 (H) 02/09/2017 10:00 AM     Lab Results   Component Value Date/Time    Sodium 138 07/11/2019 12:03 PM    Potassium 5.3 (H) 07/11/2019 12:03 PM    Chloride 105 07/11/2019 12:03 PM    CO2 16 (L) 07/11/2019 12:03 PM    Anion gap 7 01/03/2019 09:20 AM    Glucose 195 (H) 07/11/2019 12:03 PM    BUN 34 (H) 07/11/2019 12:03 PM    Creatinine 2.39 (H) 07/11/2019 12:03 PM    BUN/Creatinine ratio 14 07/11/2019 12:03 PM    GFR est AA 29 (L) 07/11/2019 12:03 PM    GFR est non-AA 25 (L) 07/11/2019 12:03 PM    Calcium 8.8 07/11/2019 12:03 PM    Bilirubin, total <0.2 07/11/2019 12:03 PM    AST (SGOT) 14 07/11/2019 12:03 PM    Alk.  phosphatase 98 07/11/2019 12:03 PM    Protein, total 6.8 07/11/2019 12:03 PM    Albumin 3.7 07/11/2019 12:03 PM    A-G Ratio 1.2 07/11/2019 12:03 PM    ALT (SGPT) 12 07/11/2019 12:03 PM     Lab Results   Component Value Date/Time    Cholesterol, total 183 07/11/2019 12:03 PM    Cholesterol (POC) 267 01/25/2013 12:05 PM    HDL Cholesterol 48 07/11/2019 12:03 PM    HDL Cholesterol (POC) 37 01/25/2013 12:05 PM    LDL Cholesterol (POC) 185 01/25/2013 12:05 PM    LDL, calculated 114 (H) 07/11/2019 12:03 PM    VLDL, calculated 21 07/11/2019 12:03 PM    Triglyceride 103 07/11/2019 12:03 PM    Triglycerides (POC) 225 01/25/2013 12:05 PM    CHOL/HDL Ratio 5.2 (H) 02/09/2017 10:00 AM       Impression and Plan:  Stephy Elena is a 37 y.o. with:    1.) nonvalvular pAF, currently NSR, ChadsVasc ~5  2.) diabetes, Type 1?  3.) HTN, avg SBP goal <140s, liable with episodes 170-180s likely due to stress/ anxiety  4.) Hyperlipidemia, LDL  5.) H/o TIAs/ CVA?  6.) CKD, known  7.) Normal LV function/ no structural heart disease based on echo 1/2019  8.) Obesity based on BMI 33  9.) acute on chronic anemia, with vaginal bleeding    1.) Continue rate control strategy with metoprolol 50 BID, seems to be doing well on current dose, can take an additional 50 mg if SBP >180 and and HR >60  2.) If SBP >180, can also take a Lasix- new Rx given  3.) Should discuss anxiety/ stress/ headaches with PCP next visit as likely contributing  4.) RTC as scheduled 2/2019, call us sooner if probs, if despite above additional meds cant bring SBP <180 and feeling poorly- please go to the ER to r/o HTN urgency/ emergency    Thank you for allowing me to participate in the care of your patient, please do not hesitate to call with questions or concerns.     Kindest Regards,    Ashley Rosenbaum, DO

## 2019-11-14 NOTE — PROGRESS NOTES
Ho Serna presents today for   Chief Complaint   Patient presents with    Follow-up     concerned about bp       Ho Serna preferred language for health care discussion is english/other. Is someone accompanying this pt?no    Is the patient using any DME equipment during OV? no    Depression Screening:  3 most recent PHQ Screens 11/14/2019   Little interest or pleasure in doing things Not at all   Feeling down, depressed, irritable, or hopeless Not at all   Total Score PHQ 2 0       Learning Assessment:  Learning Assessment 3/13/2015   PRIMARY LEARNER Patient   HIGHEST LEVEL OF EDUCATION - PRIMARY LEARNER  2 YEARS [place] PRIMARY LEARNER NONE   CO-LEARNER CAREGIVER No   PRIMARY LANGUAGE ENGLISH   LEARNER PREFERENCE PRIMARY DEMONSTRATION     -   ANSWERED BY patient   RELATIONSHIP SELF       Abuse Screening:  Abuse Screening Questionnaire 5/13/2015   Do you ever feel afraid of your partner? N   Are you in a relationship with someone who physically or mentally threatens you? N   Is it safe for you to go home? Y       Fall Risk  No flowsheet data found. Pt currently taking Anticoagulant therapy? no    Coordination of Care:  1. Have you been to the ER, urgent care clinic since your last visit? Hospitalized since your last visit? no    2. Have you seen or consulted any other health care providers outside of the [address] [place] [address] since your last visit? Include any pap smears or colon screening. no

## 2019-11-14 NOTE — TELEPHONE ENCOUNTER
Patient calls to inform provider that she has been seen by Cardiology today and adjustments were made to medication regimen as needed. Pt informed that PCP has access to this information. Pt reports that she was advised to see PCP to address anxiety. Pt requested medication for acid reflux be sent to pharmacy. Advised pt that I do not see previous encounters for this and an appointment would be required before medication could be submitted. Appointment for 12/12/19 previously scheduled for DM/HTN was revised to address anxiety/reflux concerns with provider approval.  Patient is now followed by endocrinology.

## 2019-12-12 ENCOUNTER — OFFICE VISIT (OUTPATIENT)
Dept: FAMILY MEDICINE CLINIC | Age: 34
End: 2019-12-12

## 2019-12-12 VITALS
DIASTOLIC BLOOD PRESSURE: 90 MMHG | BODY MASS INDEX: 33.68 KG/M2 | RESPIRATION RATE: 16 BRPM | SYSTOLIC BLOOD PRESSURE: 148 MMHG | OXYGEN SATURATION: 98 % | WEIGHT: 183 LBS | HEIGHT: 62 IN | TEMPERATURE: 98 F | HEART RATE: 91 BPM

## 2019-12-12 DIAGNOSIS — F32.1 MODERATE MAJOR DEPRESSION (HCC): ICD-10-CM

## 2019-12-12 DIAGNOSIS — F41.9 MODERATE ANXIETY: Primary | ICD-10-CM

## 2019-12-12 RX ORDER — METRONIDAZOLE 7.5 MG/G
GEL TOPICAL 2 TIMES DAILY
Qty: 60 G | Refills: 0 | Status: SHIPPED | OUTPATIENT
Start: 2019-12-12 | End: 2020-06-22 | Stop reason: SDUPTHER

## 2019-12-12 RX ORDER — OMEPRAZOLE 20 MG/1
20 CAPSULE, DELAYED RELEASE ORAL
Qty: 90 CAP | Refills: 0 | Status: SHIPPED | OUTPATIENT
Start: 2019-12-12 | End: 2020-12-01

## 2019-12-12 RX ORDER — BUSPIRONE HYDROCHLORIDE 7.5 MG/1
7.5 TABLET ORAL 2 TIMES DAILY
Qty: 60 TAB | Refills: 1 | Status: SHIPPED | OUTPATIENT
Start: 2019-12-12 | End: 2019-12-20 | Stop reason: SDUPTHER

## 2019-12-12 NOTE — PROGRESS NOTES
HISTORY OF PRESENT ILLNESS  Jose Angel Bates is a 37 y.o. female. Patient presents today for GERD and anxiety. States when she has increased anxiety she will feel better . Reports she has multiple personal stressors. Comments she has not been treated for anxiety in the past.    PHQ-9 depression questionnaire score 6 reflecting moderate depression; previously n/a  MINOR-7 anxiety questionnaire score 11 reflecting moderately severe anxiety; previously n/a      Allergies   Allergen Reactions    Latex Hives    Contrast Agent [Iodine] Other (comments)     \"Burning with shooting pain\"    Macrobid [Nitrofurantoin Monohyd/M-Cryst] Diarrhea    Novolog Mix 70-30 [Insulin Asp Prt-Insulin Aspart] Nausea and Vomiting     Current Outpatient Medications   Medication Sig Dispense Refill    furosemide (LASIX) 20 mg tablet Take 1 Tab by mouth daily as needed (swelling or systolic BP over  245). Take as needed daily for leg swelling. 30 Tab 3    simvastatin (ZOCOR) 40 mg tablet TAKE 1 TABLET BY MOUTH NIGHTLY 30 Tab 6    pregabalin (LYRICA) 75 mg capsule Take 1 Cap by mouth three (3) times daily. 90 Cap 3    oxybutynin (DITROPAN) 5 mg tablet Take 1 Tab by mouth daily. 30 Tab 2    ferrous sulfate 325 mg (65 mg iron) tablet Take 1 Tab by mouth daily. 30 Tab 2    XARELTO 15 mg tab tablet TAKE 1 TABLET BY MOUTH DAILY 30 Tab 6    irbesartan (AVAPRO) 150 mg tablet TAKE 1 TABLET BY MOUTH DAILY FOR BLOOD PRESSURE 90 Tab 3    metoprolol tartrate (LOPRESSOR) 50 mg tablet TAKE 1 TABLET BY MOUTH TWICE DAILY 180 Tab 6    amLODIPine (NORVASC) 5 mg tablet Take 5 mg by mouth daily.  insulin pump (PATIENT SUPPLIED) misc by SubCUTAneous route as needed.  sodium bicarbonate 650 mg tablet take 1 tablet by mouth 2 times a day  2    insulin lispro (HUMALOG) 100 unit/mL injection Given via pump. Pump set every 90 days in provider's office.  Blood-Glucose Meter monitoring kit by Does Not Apply route two (2) times a day.  1 Kit 0  glucose blood VI test strips (ONE TOUCH ULTRA TEST) strip by Does Not Apply route. One touch ultra mini test strips 1 Package 11    metroNIDAZOLE (METROGEL) 0.75 % topical gel Apply  to affected area two (2) times a day. 60 g 0     Past Medical History:   Diagnosis Date    Cardiac echocardiogram 09/19/2016    [facility]:  Tech difficult. Pt refused 2nd attempt at bubble study. EF 60%. No WMA. Mild conc LVH. RVSP 20 mmHg. No atrial shunting by Doppler.  Cardiovascular lower extremity venous duplex 06/20/2016    No DVT bilaterally.  Carotid duplex 09/19/2016    Mild < 50% bilateral ICA stenosis.     Cerebral artery occlusion with cerebral infarction ([address]     Diabetes [facility] [address]     Diabetic neuropathy ([facility] [address]     Diabetic retinopathy     Hypercholesterolemia     Hypertension      Social History     Socioeconomic History    Marital status: SINGLE     Spouse name: Not on file    Number of children: Not on file    Years of education: Not on file    Highest education level: Not on file   Occupational History     Comment: applying for disability (diabetes)   Social Needs    Financial resource strain: Not on file    Food insecurity:     Worry: Not on file     Inability: Not on file    Transportation needs:     Medical: Not on file     Non-medical: Not on file   Tobacco Use    Smoking status: Never Smoker    Smokeless tobacco: Never Used   Substance and Sexual Activity    Alcohol use: No    Drug use: No    Sexual activity: Never   Lifestyle    Physical activity:     Days per week: Not on file     Minutes per session: Not on file    Stress: Not on file   Relationships    Social connections:     Talks on phone: Not on file     Gets together: Not on file     Attends Jehovah's witness service: Not on file     Active member of club or organization: Not on file     Attends meetings of clubs or organizations: Not on file     Relationship status: Not on file    Intimate partner violence:     Fear of current or ex partner: Not on file     Emotionally abused: Not on file     Physically abused: Not on file     Forced sexual activity: Not on file   Other Topics Concern    Not on file   Social History Narrative    Not on file     Wt Readings from Last 3 Encounters:   12/12/19 183 lb (83 kg)   11/14/19 182 lb (82.6 kg)   10/07/19 181 lb (82.1 kg)     BP Readings from Last 3 Encounters:   12/12/19 148/90   11/14/19 144/86   10/07/19 188/80     Review of Systems   Constitutional: Negative for chills and fever. Respiratory: Negative for shortness of breath. Cardiovascular: Negative for chest pain and palpitations. Gastrointestinal: Positive for heartburn. Neurological: Negative for dizziness and headaches. Psychiatric/Behavioral: The patient is nervous/anxious and has insomnia. /90 (BP 1 Location: Right arm, BP Patient Position: Sitting)   Pulse 91   Temp 98 °F (36.7 °C) (Oral)   Resp 16   Ht 5' 2\" (1.575 m)   Wt 183 lb (83 kg)   SpO2 98%   BMI 33.47 kg/m²     Physical Exam  Constitutional:       Appearance: She is well-developed. HENT:      Head: Normocephalic and atraumatic. Neck:      Musculoskeletal: Normal range of motion and neck supple. Cardiovascular:      Rate and Rhythm: Normal rate and regular rhythm. Heart sounds: Normal heart sounds. No murmur. No friction rub. No gallop. Pulmonary:      Effort: Pulmonary effort is normal.      Breath sounds: Normal breath sounds. No wheezing, rhonchi or rales. Skin:     General: Skin is warm and dry. Neurological:      Mental Status: She is alert and oriented to person, place, and time. ASSESSMENT and PLAN    ICD-10-CM ICD-9-CM    1. Moderate anxiety F41.9 300.00    2.  Moderate major depression (HCC) F32.1 296.22      Orders Placed This Encounter    metroNIDAZOLE (METROGEL) 0.75 % topical gel    busPIRone (BUSPAR) 7.5 mg tablet    omeprazole (PRILOSEC) 20 mg capsule         I have discussed the diagnosis with the patient and the intended plan as seen in the above orders. The patient has received an after-visit summary and questions were answered concerning future plans. I have discussed medication side effects and warnings with the patient as well. Patient agreeable with above plan and verbalizes understanding. Follow-up and Dispositions    · Return in about 6 weeks (around 1/23/2020) for anxiety.

## 2019-12-12 NOTE — PROGRESS NOTES
Chief Complaint   Patient presents with    Anxiety    GERD     1. Have you been to the ER, urgent care clinic since your last visit? Hospitalized since your last visit? No    2. Have you seen or consulted any other health care providers outside of the [address] [place] [address] since your last visit? Include any pap smears or colon screening.  No

## 2019-12-12 NOTE — PATIENT INSTRUCTIONS
Buspirone (By mouth) Buspirone ([id number]) Treats anxiety. Brand Name(s):  
There may be other brand names for this medicine. When This Medicine Should Not Be Used: You should not use this medicine if you have had an allergic reaction to buspirone. How to Use This Medicine:  
Tablet · Your doctor will tell you how much of this medicine to take and how often. Do not take more medicine or take it more often than your doctor tells you to. · You may take this medicine with or without food, but take it the same way each time. · You may need to take this medicine for 1 or 2 weeks before you begin to feel better. If a dose is missed: · If you miss a dose or forget to take your medicine, take it as soon as you can. If it is almost time for your next dose, wait until then to take the medicine and skip the missed dose. · Do not use extra medicine to make up for a missed dose. How to Store and Dispose of This Medicine: · Store the medicine at room temperature, away from heat, moisture, and direct light. · Keep all medicine out of the reach of children and never share your medicine with anyone. Drugs and Foods to Avoid: Ask your doctor or pharmacist before using any other medicine, including over-the-counter medicines, vitamins, and herbal products. · You should not use buspirone when you are also using an MAO inhibitor (such as Eldepryl®, Marplan®, Nardil®, Parnate®). · Do not eat grapefruit, drink grapefruit juice, or drink alcohol while you are using buspirone. · Make sure your doctor knows if you are also using cimetidine (Dion Martinez), dexamethasone (Decadron®), diltiazem (Radha Lo), erythromycin (Erythro-Tab®), itraconazole (Sporanox®), nefazodone (Serzone®), rifampin (Rifadin®, Rifamate®, Rifater®), verapamil (Jef Kinnier), or medicine for seizures (such as Dilantin®, Luminal®, Tegretol®).  
· Make sure your doctor knows if you are using any medicines that make you sleepy (such as sleeping pills, cold and allergy medicine, narcotic pain relievers, or sedatives). Warnings While Using This Medicine: · Make sure your doctor knows if you are pregnant or breastfeeding, or if you have kidney or liver disease. · Do not stop using this medicine suddenly without asking your doctor. You may need to slowly decrease your dose before stopping it completely. · This medicine may make you dizzy or drowsy. Avoid driving, using machines, or doing anything else that could be dangerous if you are not alert. Possible Side Effects While Using This Medicine:  
Call your doctor right away if you notice any of these side effects: 
· Fast or pounding heartbeat · Numbness or tingling feeling · Tremors or shaking If you notice these less serious side effects, talk with your doctor: · Drowsiness or weakness · Dry mouth · Feeling restless or nervous, trouble sleeping · Headache · Nausea, constipation, upset stomach If you notice other side effects that you think are caused by this medicine, tell your doctor. Call your doctor for medical advice about side effects. You may report side effects to FDA at 7-587-FDA-5612 © 2017 [facility] [place] Information is for End User's use only and may not be sold, redistributed or otherwise used for commercial purposes. The above information is an  only. It is not intended as medical advice for individual conditions or treatments. Talk to your doctor, nurse or pharmacist before following any medical regimen to see if it is safe and effective for you.

## 2019-12-18 ENCOUNTER — TELEPHONE (OUTPATIENT)
Dept: FAMILY MEDICINE CLINIC | Age: 34
End: 2019-12-18

## 2019-12-18 NOTE — TELEPHONE ENCOUNTER
Patient reports that since taking  Buspar she has experienced feeling weak and dizzy as soon as she takes it. She is requesting a lower dosage or alternate medication.

## 2019-12-19 NOTE — TELEPHONE ENCOUNTER
Please inform patient to discontinue medication. I will send over a lower dosage of buspar 5 mg to take twice a day. Thanks!

## 2019-12-20 RX ORDER — BUSPIRONE HYDROCHLORIDE 5 MG/1
5 TABLET ORAL 2 TIMES DAILY
Qty: 60 TAB | Refills: 0 | Status: SHIPPED | OUTPATIENT
Start: 2019-12-20 | End: 2020-03-10 | Stop reason: SDUPTHER

## 2019-12-20 NOTE — TELEPHONE ENCOUNTER
Placed call to patient to inform her that provider has sent over a decreased dosage of Buspar and she is to take 5mg twice per day. A non-detailed message was left requesting the patient return call to the office.

## 2020-01-23 ENCOUNTER — OFFICE VISIT (OUTPATIENT)
Dept: FAMILY MEDICINE CLINIC | Age: 35
End: 2020-01-23

## 2020-01-23 VITALS
HEART RATE: 90 BPM | RESPIRATION RATE: 20 BRPM | BODY MASS INDEX: 32.76 KG/M2 | WEIGHT: 178 LBS | HEIGHT: 62 IN | OXYGEN SATURATION: 98 % | DIASTOLIC BLOOD PRESSURE: 87 MMHG | SYSTOLIC BLOOD PRESSURE: 122 MMHG | TEMPERATURE: 98.3 F

## 2020-01-23 DIAGNOSIS — F41.9 MILD ANXIETY: ICD-10-CM

## 2020-01-23 DIAGNOSIS — E11.43 DIABETIC GASTROPARESIS ASSOCIATED WITH TYPE 2 DIABETES MELLITUS (HCC): Primary | ICD-10-CM

## 2020-01-23 DIAGNOSIS — K31.84 DIABETIC GASTROPARESIS ASSOCIATED WITH TYPE 2 DIABETES MELLITUS (HCC): Primary | ICD-10-CM

## 2020-01-23 RX ORDER — SODIUM BICARBONATE 650 MG/1
TABLET ORAL
Qty: 60 TAB | Refills: 2 | Status: SHIPPED | OUTPATIENT
Start: 2020-01-23 | End: 2020-10-29 | Stop reason: SDUPTHER

## 2020-01-23 RX ORDER — FLUCONAZOLE 150 MG/1
150 TABLET ORAL
Qty: 2 TAB | Refills: 0 | Status: SHIPPED | OUTPATIENT
Start: 2020-01-23 | End: 2020-01-31

## 2020-01-23 NOTE — PROGRESS NOTES
HISTORY OF PRESENT ILLNESS  Vincent Russell is a 37 y.o. female. Patient states since beginning buspar 5 mg symptoms have improved. PHQ-9 depression questionnaire score 3 reflecting very mild depression; previously 6 (12/12/19)  MINOR-7 anxiety questionnaire score 1 reflecting minimal anxiety; previously 9 (12/12/19)  States she has a history of gastroparesis. Comments she has noticed at times she will belch in the morning and will taste the food she ate the night prior. Reports she does stay feel full often but denies early satiety. States she has had a gastric emptying study in the past which revealed delayed gastric emptying. Comments at times she feels like her symptoms have worsened. Allergies   Allergen Reactions    Latex Hives    Contrast Agent [Iodine] Other (comments)     \"Burning with shooting pain\"    Macrobid [Nitrofurantoin Monohyd/M-Cryst] Diarrhea    Novolog Mix 70-30 [Insulin Asp Prt-Insulin Aspart] Nausea and Vomiting     Current Outpatient Medications   Medication Sig Dispense Refill    busPIRone (BUSPAR) 5 mg tablet Take 1 Tab by mouth two (2) times a day. 60 Tab 0    metroNIDAZOLE (METROGEL) 0.75 % topical gel Apply  to affected area two (2) times a day. 60 g 0    omeprazole (PRILOSEC) 20 mg capsule Take 1 Cap by mouth Daily (before breakfast). 90 Cap 0    furosemide (LASIX) 20 mg tablet Take 1 Tab by mouth daily as needed (swelling or systolic BP over  192). Take as needed daily for leg swelling. 30 Tab 3    simvastatin (ZOCOR) 40 mg tablet TAKE 1 TABLET BY MOUTH NIGHTLY 30 Tab 6    pregabalin (LYRICA) 75 mg capsule Take 1 Cap by mouth three (3) times daily. 90 Cap 3    oxybutynin (DITROPAN) 5 mg tablet Take 1 Tab by mouth daily. 30 Tab 2    ferrous sulfate 325 mg (65 mg iron) tablet Take 1 Tab by mouth daily.  30 Tab 2    XARELTO 15 mg tab tablet TAKE 1 TABLET BY MOUTH DAILY 30 Tab 6    irbesartan (AVAPRO) 150 mg tablet TAKE 1 TABLET BY MOUTH DAILY FOR BLOOD PRESSURE 90 Tab 3    metoprolol tartrate (LOPRESSOR) 50 mg tablet TAKE 1 TABLET BY MOUTH TWICE DAILY 180 Tab 6    amLODIPine (NORVASC) 5 mg tablet Take 5 mg by mouth daily.  insulin pump (PATIENT SUPPLIED) misc by SubCUTAneous route as needed.  sodium bicarbonate 650 mg tablet take 1 tablet by mouth 2 times a day  2    insulin lispro (HUMALOG) 100 unit/mL injection Given via pump. Pump set every 90 days in provider's office.  Blood-Glucose Meter monitoring kit by Does Not Apply route two (2) times a day. 1 Kit 0    glucose blood VI test strips (ONE TOUCH ULTRA TEST) strip by Does Not Apply route. One touch ultra mini test strips 1 Package 11     Past Medical History:   Diagnosis Date    Cardiac echocardiogram 09/19/2016    [facility]:  Tech difficult. Pt refused 2nd attempt at bubble study. EF 60%. No WMA. Mild conc LVH. RVSP 20 mmHg. No atrial shunting by Doppler.  Cardiovascular lower extremity venous duplex 06/20/2016    No DVT bilaterally.  Carotid duplex 09/19/2016    Mild < 50% bilateral ICA stenosis.     Cerebral artery occlusion with cerebral infarction ([address]     Diabetes ([address]     Diabetic neuropathy ([address]     Diabetic retinopathy     Hypercholesterolemia     Hypertension      Social History     Socioeconomic History    Marital status: SINGLE     Spouse name: Not on file    Number of children: Not on file    Years of education: Not on file    Highest education level: Not on file   Occupational History     Comment: applying for disability (diabetes)   Social Needs    Financial resource strain: Not on file    Food insecurity:     Worry: Not on file     Inability: Not on file    Transportation needs:     Medical: Not on file     Non-medical: Not on file   Tobacco Use    Smoking status: Never Smoker    Smokeless tobacco: Never Used   Substance and Sexual Activity    Alcohol use: No    Drug use: No    Sexual activity: Never   Lifestyle    Physical activity:     Days per week: Not on file     Minutes per session: Not on file    Stress: Not on file   Relationships    Social connections:     Talks on phone: Not on file     Gets together: Not on file     Attends Taoism service: Not on file     Active member of club or organization: Not on file     Attends meetings of clubs or organizations: Not on file     Relationship status: Not on file    Intimate partner violence:     Fear of current or ex partner: Not on file     Emotionally abused: Not on file     Physically abused: Not on file     Forced sexual activity: Not on file   Other Topics Concern    Not on file   Social History Narrative    Not on file     Wt Readings from Last 3 Encounters:   01/23/20 178 lb (80.7 kg)   12/12/19 183 lb (83 kg)   11/14/19 182 lb (82.6 kg)     BP Readings from Last 3 Encounters:   01/23/20 122/87   12/12/19 148/90   11/14/19 144/86     Review of Systems   Constitutional: Negative for chills and fever. Respiratory: Negative for shortness of breath. Cardiovascular: Negative for chest pain and palpitations. Neurological: Negative for dizziness and headaches. /87 (BP 1 Location: Left arm, BP Patient Position: Sitting)   Pulse 90   Temp 98.3 °F (36.8 °C) (Oral)   Resp 20   Ht 5' 2\" (1.575 m)   Wt 178 lb (80.7 kg)   LMP 01/16/2020   SpO2 98%   BMI 32.56 kg/m²     Physical Exam  Constitutional:       Appearance: She is well-developed. HENT:      Head: Normocephalic and atraumatic. Neck:      Musculoskeletal: Normal range of motion and neck supple. Cardiovascular:      Rate and Rhythm: Normal rate and regular rhythm. Heart sounds: Normal heart sounds. No murmur. No friction rub. No gallop. Pulmonary:      Effort: Pulmonary effort is normal.      Breath sounds: Normal breath sounds. No wheezing, rhonchi or rales. Abdominal:      General: Bowel sounds are normal.      Palpations: Abdomen is soft. Skin:     General: Skin is warm and dry.    Neurological:      Mental Status: She is alert and oriented to person, place, and time. Psychiatric:         Behavior: Behavior normal.         ASSESSMENT and PLAN    ICD-10-CM ICD-9-CM    1. Diabetic gastroparesis associated with type 2 diabetes mellitus (HCC) E11.43 250.60 REFERRAL TO GASTROENTEROLOGY    K31.84 536.3    2. Mild anxiety F41.9 300.00      Orders Placed This Encounter    REFERRAL TO GASTROENTEROLOGY    sodium bicarbonate 650 mg tablet    fluconazole (DIFLUCAN) 150 mg tablet         I have discussed the diagnosis with the patient and the intended plan as seen in the above orders. The patient has received an after-visit summary and questions were answered concerning future plans. I have discussed medication side effects and warnings with the patient as well. Patient agreeable with above plan and verbalizes understanding. Follow-up and Dispositions    · Return in about 4 months (around 5/23/2020) for anxiety/HTN.

## 2020-02-06 LAB
HBA1C MFR BLD HPLC: 9.5 %
LDL-C, EXTERNAL: 59

## 2020-02-07 ENCOUNTER — TELEPHONE (OUTPATIENT)
Dept: FAMILY MEDICINE CLINIC | Age: 35
End: 2020-02-07

## 2020-02-07 NOTE — TELEPHONE ENCOUNTER
Patient called the office and states that she would like new referrals placed for Endocrinology, Nephrology and to follow up with the referral for GI. [facility] that Dr. Yessenia Howard -Nephrology in [place] accepts her insurance. She states that she needs to have a location in [place] or [facility] for Endocrinology and GI. [facility] has 2 locations that can accommodate her if they accept her insurance.

## 2020-02-14 NOTE — TELEPHONE ENCOUNTER
Placed call to patient to inform her that we have not been able to locate an Endocrinologist in [place] or [place] that accept her insurance. She may call [employer] to see if they know of one or she will have to travel to [facility] or [facility] for an Endocrinologist that does accept her insurance.

## 2020-02-19 ENCOUNTER — OFFICE VISIT (OUTPATIENT)
Dept: CARDIOLOGY CLINIC | Age: 35
End: 2020-02-19

## 2020-02-19 VITALS
WEIGHT: 179 LBS | HEIGHT: 62 IN | OXYGEN SATURATION: 96 % | DIASTOLIC BLOOD PRESSURE: 84 MMHG | BODY MASS INDEX: 32.94 KG/M2 | SYSTOLIC BLOOD PRESSURE: 132 MMHG | HEART RATE: 81 BPM

## 2020-02-19 DIAGNOSIS — I48.0 PAROXYSMAL ATRIAL FIBRILLATION (HCC): Primary | ICD-10-CM

## 2020-02-19 NOTE — PROGRESS NOTES
Joseph Rodriguez presents today for   Chief Complaint   Patient presents with    Irregular Heart Beat     3 MONTH F/U     Leg Swelling     1 week        Joseph Rodriguez preferred language for health care discussion is english/other. Is someone accompanying this pt? no    Is the patient using any DME equipment during [facility]? no    Depression Screening:  3 most recent PHQ Screens 1/23/2020   Little interest or pleasure in doing things Not at all   Feeling down, depressed, irritable, or hopeless Not at all   Total Score PHQ 2 0   Trouble falling or staying asleep, or sleeping too much Not at all   Feeling tired or having little energy Nearly every day   Poor appetite, weight loss, or overeating Not at all   Feeling bad about yourself - or that you are a failure or have let yourself or your family down Not at all   Trouble concentrating on things such as school, work, reading, or watching TV Not at all   Moving or speaking so slowly that other people could have noticed; or the opposite being so fidgety that others notice Not at all   Thoughts of being better off dead, or hurting yourself in some way Not at all   PHQ 9 Score 3   How difficult have these problems made it for you to do your work, take care of your home and get along with others Somewhat difficult       Learning Assessment:  Learning Assessment 1/23/2020   PRIMARY LEARNER Patient   HIGHEST LEVEL OF EDUCATION - PRIMARY LEARNER  2 YEARS OF COLLEGE   BARRIERS PRIMARY LEARNER NONE   CO-LEARNER CAREGIVER No   PRIMARY LANGUAGE ENGLISH   LEARNER PREFERENCE PRIMARY DEMONSTRATION     -   ANSWERED BY self   RELATIONSHIP SELF       Abuse Screening:  Abuse Screening Questionnaire 5/13/2015   Do you ever feel afraid of your partner? N   Are you in a relationship with someone who physically or mentally threatens you? N   Is it safe for you to go home? Y       Fall Risk  No flowsheet data found. Pt currently taking Anticoagulant therapy?  yes    Coordination of Care:  1. Have you been to the ER, urgent care clinic since your last visit? Hospitalized since your last visit? no    2. Have you seen or consulted any other health care providers outside of the [address] [place] [address] since your last visit? Include any pap smears or colon screening.  no

## 2020-02-19 NOTE — PROGRESS NOTES
Reynaldo Rae    Chief Complaint   Patient presents with    Irregular Heart Beat     3 MONTH F/U     Leg Swelling   f/u HTN, pAF, HFpEF    HPI    Reynaldo Rea is a 37 y.o. young -American female with CKD, DM, here for follow-up of pAF and HTN, c/o SOB and palpitations. As you know patient used to follow with my late partner for nonvalvular paroxsymal atrial fibrillation that was diagnosed by event monitor ~2017. Despite being so young, she has a high enough CHADsVASC score (DM, HTN) and apparent history of recurrent TIA that he initiated renally dosed systemic anticoagulation for stroke prophylaxis. She follows with a kidney doctor for CKD. At [facility] after being found down by her son. He said this was found to be due to her diabetes and she had severe hypoglycemia. During her hospitalization she had an echocardiogram 1/4/19 that noted normal LV function at 66%, significant valvular pathology and no effusions. Mitral valve leaflets was noted to be a bit sclerotic but it did not affect its mobility or function. Personally obtained and reviewed these records with patient during our encounter today. she does have paroxysms of pAF and is mostly asymptomatic but does notice that particular when she is laying down at night to go to sleep. We sent her for a sleep study and she was found to have insomnia but no SHIRLEY. Otherwise no CP, doesn't feel her heart racing. She takes Lasix as needed- last took a couple days ago. She was having major issues with her BP- we recognized this was likely related to stress/ untreated anxiety. She started on Buspar- and even low dose she feels incredibly better. Her SBP is well controlled today. No complaints. She does admit last week she ate pizza that caused her to have some LE edema but that resolved. Cardiac RFs: DM (dx age 21, type 1 vs type 2?), HTN, HL, TIAs?      Past Medical History:   Diagnosis Date    Cardiac echocardiogram 09/19/2016    [facility]:  Tech difficult. Pt refused 2nd attempt at bubble study. EF 60%. No WMA. Mild conc LVH. RVSP 20 mmHg. No atrial shunting by Doppler.  Cardiovascular lower extremity venous duplex 06/20/2016    No DVT bilaterally.  Carotid duplex 09/19/2016    Mild < 50% bilateral ICA stenosis.  Cerebral artery occlusion with cerebral infarction ([address]     Diabetes ([address]     Diabetic neuropathy ([address]     Diabetic retinopathy     Hypercholesterolemia     Hypertension        Past Surgical History:   Procedure Laterality Date    HX ORTHOPAEDIC  January 2013    right toe nail surgery Dr. Gary Dose       Current Outpatient Medications   Medication Sig Dispense Refill    sodium bicarbonate 650 mg tablet take 1 tablet by mouth 2 times a day 60 Tab 2    busPIRone (BUSPAR) 5 mg tablet Take 1 Tab by mouth two (2) times a day. 60 Tab 0    metroNIDAZOLE (METROGEL) 0.75 % topical gel Apply  to affected area two (2) times a day. 60 g 0    omeprazole (PRILOSEC) 20 mg capsule Take 1 Cap by mouth Daily (before breakfast). 90 Cap 0    furosemide (LASIX) 20 mg tablet Take 1 Tab by mouth daily as needed (swelling or systolic BP over  583). Take as needed daily for leg swelling. 30 Tab 3    simvastatin (ZOCOR) 40 mg tablet TAKE 1 TABLET BY MOUTH NIGHTLY 30 Tab 6    pregabalin (LYRICA) 75 mg capsule Take 1 Cap by mouth three (3) times daily. 90 Cap 3    oxybutynin (DITROPAN) 5 mg tablet Take 1 Tab by mouth daily. 30 Tab 2    ferrous sulfate 325 mg (65 mg iron) tablet Take 1 Tab by mouth daily. 30 Tab 2    XARELTO 15 mg tab tablet TAKE 1 TABLET BY MOUTH DAILY 30 Tab 6    irbesartan (AVAPRO) 150 mg tablet TAKE 1 TABLET BY MOUTH DAILY FOR BLOOD PRESSURE 90 Tab 3    metoprolol tartrate (LOPRESSOR) 50 mg tablet TAKE 1 TABLET BY MOUTH TWICE DAILY 180 Tab 6    amLODIPine (NORVASC) 5 mg tablet Take 5 mg by mouth daily.  insulin pump (PATIENT SUPPLIED) misc by SubCUTAneous route as needed.       insulin lispro (HUMALOG) 100 unit/mL injection Given via pump. Pump set every 90 days in provider's office.  Blood-Glucose Meter monitoring kit by Does Not Apply route two (2) times a day. 1 Kit 0    glucose blood VI test strips (ONE TOUCH ULTRA TEST) strip by Does Not Apply route.  One touch ultra mini test strips 1 Package 11       Allergies   Allergen Reactions    Latex Hives    Contrast Agent [Iodine] Other (comments)     \"Burning with shooting pain\"    Macrobid [Nitrofurantoin Monohyd/M-Cryst] Diarrhea    Novolog Mix 70-30 [Insulin Asp Prt-Insulin Aspart] Nausea and Vomiting       Social History     Socioeconomic History    Marital status: SINGLE     Spouse name: Not on file    Number of children: Not on file    Years of education: Not on file    Highest education level: Not on file   Occupational History     Comment: applying for disability (diabetes)   Social Needs    Financial resource strain: Not on file    Food insecurity:     Worry: Not on file     Inability: Not on file    Transportation needs:     Medical: Not on file     Non-medical: Not on file   Tobacco Use    Smoking status: Never Smoker    Smokeless tobacco: Never Used   Substance and Sexual Activity    Alcohol use: No    Drug use: No    Sexual activity: Never   Lifestyle    Physical activity:     Days per week: Not on file     Minutes per session: Not on file    Stress: Not on file   Relationships    Social connections:     Talks on phone: Not on file     Gets together: Not on file     Attends Catholic service: Not on file     Active member of club or organization: Not on file     Attends meetings of clubs or organizations: Not on file     Relationship status: Not on file    Intimate partner violence:     Fear of current or ex partner: Not on file     Emotionally abused: Not on file     Physically abused: Not on file     Forced sexual activity: Not on file   Other Topics Concern    Not on file   Social History Narrative    Not on file        FH negative for premature CAD and SCD. Review of Systems    14 pt Review of Systems is negative unless otherwise mentioned in the HPI. Wt Readings from Last 3 Encounters:   02/19/20 81.2 kg (179 lb)   01/23/20 80.7 kg (178 lb)   12/12/19 83 kg (183 lb)     Temp Readings from Last 3 Encounters:   01/23/20 98.3 °F (36.8 °C) (Oral)   12/12/19 98 °F (36.7 °C) (Oral)   10/07/19 98.3 °F (36.8 °C) (Oral)     BP Readings from Last 3 Encounters:   01/23/20 122/87   12/12/19 148/90   11/14/19 144/86     Pulse Readings from Last 3 Encounters:   01/23/20 90   12/12/19 91   11/14/19 80     Physical Exam:    Visit Vitals  Ht 5' 2\" (1.575 m)   Wt 81.2 kg (179 lb)   BMI 32.74 kg/m²      Physical Exam   Constitutional: She is oriented to person, place, and time. She appears well-developed and well-nourished. HENT:   Head: Normocephalic and atraumatic. Eyes: Pupils are equal, round, and reactive to light. EOM are normal.   Neck: No JVD present. Cardiovascular: Normal rate, regular rhythm, normal heart sounds and intact distal pulses. Exam reveals no gallop and no friction rub. No murmur heard. Pulmonary/Chest: Effort normal and breath sounds normal. No respiratory distress. She has no wheezes. She has no rales. She exhibits no tenderness. Abdominal: Soft. Bowel sounds are normal.   Musculoskeletal:         General: No tenderness or edema. Neurological: She is alert and oriented to person, place, and time. Skin: Skin is warm and dry. Psychiatric: She has a normal mood and affect.        EKG last: NSR, normal axis and intervals, no ST segment abnormalities    Lab Results   Component Value Date/Time    TSH 2.140 07/11/2019 12:03 PM     Lab Results   Component Value Date/Time    Cholesterol, total 183 07/11/2019 12:03 PM    Cholesterol (POC) 267 01/25/2013 12:05 PM    HDL Cholesterol 48 07/11/2019 12:03 PM    HDL Cholesterol (POC) 37 01/25/2013 12:05 PM    LDL Cholesterol (POC) 185 01/25/2013 12:05 PM LDL, calculated 114 (H) 07/11/2019 12:03 PM    VLDL, calculated 21 07/11/2019 12:03 PM    Triglyceride 103 07/11/2019 12:03 PM    Triglycerides (POC) 225 01/25/2013 12:05 PM    CHOL/HDL Ratio 5.2 (H) 02/09/2017 10:00 AM     Lab Results   Component Value Date/Time    Sodium 138 07/11/2019 12:03 PM    Potassium 5.3 (H) 07/11/2019 12:03 PM    Chloride 105 07/11/2019 12:03 PM    CO2 16 (L) 07/11/2019 12:03 PM    Anion gap 7 01/03/2019 09:20 AM    Glucose 195 (H) 07/11/2019 12:03 PM    BUN 34 (H) 07/11/2019 12:03 PM    Creatinine 2.39 (H) 07/11/2019 12:03 PM    BUN/Creatinine ratio 14 07/11/2019 12:03 PM    GFR est AA 29 (L) 07/11/2019 12:03 PM    GFR est non-AA 25 (L) 07/11/2019 12:03 PM    Calcium 8.8 07/11/2019 12:03 PM    Bilirubin, total <0.2 07/11/2019 12:03 PM    AST (SGOT) 14 07/11/2019 12:03 PM    Alk.  phosphatase 98 07/11/2019 12:03 PM    Protein, total 6.8 07/11/2019 12:03 PM    Albumin 3.7 07/11/2019 12:03 PM    A-G Ratio 1.2 07/11/2019 12:03 PM    ALT (SGPT) 12 07/11/2019 12:03 PM     Lab Results   Component Value Date/Time    Cholesterol, total 183 07/11/2019 12:03 PM    Cholesterol (POC) 267 01/25/2013 12:05 PM    HDL Cholesterol 48 07/11/2019 12:03 PM    HDL Cholesterol (POC) 37 01/25/2013 12:05 PM    LDL Cholesterol (POC) 185 01/25/2013 12:05 PM    LDL, calculated 114 (H) 07/11/2019 12:03 PM    VLDL, calculated 21 07/11/2019 12:03 PM    Triglyceride 103 07/11/2019 12:03 PM    Triglycerides (POC) 225 01/25/2013 12:05 PM    CHOL/HDL Ratio 5.2 (H) 02/09/2017 10:00 AM       Impression and Plan:  Donavon Ornelas is a 37 y.o. with:    1.) nonvalvular pAF, currently NSR, ChadsVasc ~5  2.) diabetes, Type 1?  3.) HTN, avg SBP goal <140s, prior h/o HTN urgency, now well controlled  4.) Hyperlipidemia, LDL  5.) H/o TIAs/ CVA?  6.) CKD, known  7.) Normal LV function/ no structural heart disease based on echo 1/2019  8.) Obesity based on BMI 33  9.) acute on chronic anemia, with vaginal bleeding    1.) Continue rate control strategy with metoprolol 50 BID, seems to be doing well on current dose, can take an additional 50 mg if SBP >180 and and HR >60  2.) Continue Xarelto systemic anticoagulation stroke ppx  3.) Low sodium diet education dw pt  4.) Overall much improved, RTC 6 months- if still doing well can do yearly    SBP dramatically improved  In NSR, on medical therapy    Thank you for allowing me to participate in the care of your patient, please do not hesitate to call with questions or concerns.     Kindest Regards,    Truman Whalen, DO

## 2020-03-09 RX ORDER — VALACYCLOVIR HYDROCHLORIDE 1 G/1
TABLET, FILM COATED ORAL
Qty: 5 TAB | Refills: 5 | Status: SHIPPED | OUTPATIENT
Start: 2020-03-09 | End: 2020-11-30 | Stop reason: SDUPTHER

## 2020-03-09 RX ORDER — OXYBUTYNIN CHLORIDE 5 MG/1
TABLET ORAL
Qty: 30 TAB | Refills: 2 | Status: SHIPPED | OUTPATIENT
Start: 2020-03-09 | End: 2020-07-13 | Stop reason: SDUPTHER

## 2020-03-10 NOTE — TELEPHONE ENCOUNTER
Last Visit: 1/23/20 with KEHINDE Restrepo  Next Appointment: 5/21/20 with KEHINDE Restrepo  Previous Refill Encounter(s): 12/10/19 #60    Requested Prescriptions     Pending Prescriptions Disp Refills    busPIRone (BUSPAR) 5 mg tablet 60 Tab 0     Sig: Take 1 Tab by mouth two (2) times a day.

## 2020-03-11 RX ORDER — BUSPIRONE HYDROCHLORIDE 5 MG/1
5 TABLET ORAL 2 TIMES DAILY
Qty: 60 TAB | Refills: 2 | Status: SHIPPED | OUTPATIENT
Start: 2020-03-11 | End: 2020-11-30 | Stop reason: SDUPTHER

## 2020-03-23 RX ORDER — RIVAROXABAN 15 MG/1
TABLET, FILM COATED ORAL
Qty: 30 TAB | Refills: 6 | Status: SHIPPED | OUTPATIENT
Start: 2020-03-23 | End: 2020-05-17

## 2020-05-15 PROBLEM — N17.9 ACUTE KIDNEY INJURY SUPERIMPOSED ON CHRONIC KIDNEY DISEASE (HCC): Status: ACTIVE | Noted: 2020-05-15

## 2020-05-15 PROBLEM — N18.4 CHRONIC RENAL IMPAIRMENT, STAGE 4 (SEVERE) (HCC): Status: ACTIVE | Noted: 2019-12-11

## 2020-05-15 PROBLEM — I48.0 PAF (PAROXYSMAL ATRIAL FIBRILLATION) (HCC): Status: ACTIVE | Noted: 2020-05-15

## 2020-05-15 PROBLEM — D62 ACUTE BLOOD LOSS ANEMIA: Status: ACTIVE | Noted: 2020-05-15

## 2020-05-15 PROBLEM — N18.4 TYPE 2 DIABETES MELLITUS WITH STAGE 4 CHRONIC KIDNEY DISEASE, WITH LONG-TERM CURRENT USE OF INSULIN (HCC): Status: ACTIVE | Noted: 2018-08-14

## 2020-05-15 PROBLEM — E87.5 ACUTE HYPERKALEMIA: Status: ACTIVE | Noted: 2020-05-15

## 2020-05-15 PROBLEM — E11.22 TYPE 2 DIABETES MELLITUS WITH STAGE 4 CHRONIC KIDNEY DISEASE, WITH LONG-TERM CURRENT USE OF INSULIN (HCC): Status: ACTIVE | Noted: 2018-08-14

## 2020-05-15 PROBLEM — N18.9 ACUTE KIDNEY INJURY SUPERIMPOSED ON CHRONIC KIDNEY DISEASE (HCC): Status: ACTIVE | Noted: 2020-05-15

## 2020-05-15 PROBLEM — N93.9 ABNORMAL VAGINAL BLEEDING: Status: ACTIVE | Noted: 2020-05-15

## 2020-05-15 PROBLEM — Z79.4 TYPE 2 DIABETES MELLITUS WITH STAGE 4 CHRONIC KIDNEY DISEASE, WITH LONG-TERM CURRENT USE OF INSULIN (HCC): Status: ACTIVE | Noted: 2018-08-14

## 2020-05-15 PROBLEM — I10 ESSENTIAL HYPERTENSION: Status: ACTIVE | Noted: 2019-12-11

## 2020-05-20 ENCOUNTER — TELEPHONE (OUTPATIENT)
Dept: CARDIOLOGY CLINIC | Age: 35
End: 2020-05-20

## 2020-05-20 NOTE — TELEPHONE ENCOUNTER
Patient in the hospital due to vaginal bleeding , per the daughter the patient states she lost a lot of blood and they stopped her xarelto. Patient wanted to know what to do now.     Verbal order and read back per 73 Melvi Tellez to hold xarelto till next appt in aug, can move up appt if needed     Patient aware

## 2020-05-22 ENCOUNTER — PATIENT OUTREACH (OUTPATIENT)
Dept: CASE MANAGEMENT | Age: 35
End: 2020-05-22

## 2020-05-22 NOTE — PROGRESS NOTES
Hospital Discharge Follow-Up      Date/Time:  2020 2:04 PM    Patient was admitted to [facility] on 5/15/20 and discharged on  for vaginal bleeding, CKD. The physician discharge summary was available at the time of outreach. Top Challenges reviewed with the provider       Advance Care Planning:   Does patient have an Advance Directive:  not on file        Method of communication with provider : telephone     Was this a readmission? no   Patient stated reason for the readmission: n/a    Care Transition Nurse (CTN) contacted the patient by telephone to perform post hospital discharge assessment. Verified name and  with patient as identifiers. Provided introduction to self, and explanation of the CTN role. Patient states that she attended appt. with GYN already. Pt. Will follow up with PCP. Patient reported that she is okay but still bleeding. GYN doctor aware. CDC guidelines, and Red flags on when to go back to ED (Chest pain, difficulty breathing, shortness of breath, high fevers and/or worsening of symptoms) were reviewed and discuss with Pt. Pt. Verbalized and repeated back understanding. Patient received hospital discharge instructions. CTN reviewed discharge instructions and red flags with patient who verbalized understanding. Patient given an opportunity to ask questions and does not have any further questions or concerns at this time. The patient agrees to contact the PCP office for questions related to their healthcare. CTN provided contact information for future reference. Disease Specific:   N/A    Patients top risk factors for readmission:  none noted at this. Home Health orders at discharge: none    Durable Medical Equipment ordered at discharge: none    Medication(s):   New Medications at Discharge: none noted. Changed Medications at Discharge:  none noted.     Discontinued Medications at Discharge:   STOP taking these medications as per  Terefe         Xarelto 15 mg tab tablet Comments:   Reason for Stopping:            furosemide (LASIX) 20 mg tablet Comments:   Reason for Stopping:            irbesartan (AVAPRO) 150 mg tablet Comments:   Reason for Stopping:                Medication reconciliation was performed with patient, who verbalizes understanding of administration of home medications. There were no barriers to obtaining medications identified at this time. Referral to Pharm D needed: no     Current Outpatient Medications   Medication Sig    busPIRone (BUSPAR) 5 mg tablet Take 1 Tab by mouth two (2) times a day.  oxybutynin (DITROPAN) 5 mg tablet TAKE 1 TABLET BY MOUTH DAILY    sodium bicarbonate 650 mg tablet take 1 tablet by mouth 2 times a day    metroNIDAZOLE (METROGEL) 0.75 % topical gel Apply  to affected area two (2) times a day.  omeprazole (PRILOSEC) 20 mg capsule Take 1 Cap by mouth Daily (before breakfast).  simvastatin (ZOCOR) 40 mg tablet TAKE 1 TABLET BY MOUTH NIGHTLY    pregabalin (LYRICA) 75 mg capsule Take 1 Cap by mouth three (3) times daily.  ferrous sulfate 325 mg (65 mg iron) tablet Take 1 Tab by mouth daily. (Patient taking differently: Take 325 mg by mouth as needed.)    metoprolol tartrate (LOPRESSOR) 50 mg tablet TAKE 1 TABLET BY MOUTH TWICE DAILY    amLODIPine (NORVASC) 5 mg tablet Take 5 mg by mouth daily.  insulin pump (PATIENT SUPPLIED) misc by SubCUTAneous route as needed.  insulin lispro (HUMALOG) 100 unit/mL injection Given via pump. Pump set every 90 days in provider's office.  Blood-Glucose Meter monitoring kit by Does Not Apply route two (2) times a day.  glucose blood VI test strips (ONE TOUCH ULTRA TEST) strip by Does Not Apply route. One touch ultra mini test strips    valACYclovir (VALTREX) 1 gram tablet TAKE 1 TABLET BY MOUTH DAILY FOR 5 DAYS     No current facility-administered medications for this visit. There are no discontinued medications.     [facility] follow up appointment(s):   Future Appointments   Date Time Provider Brian Clifford   2020 10:20 AM Guilherme Kearney,  [facility]      [place] [place] OB GYN 20    [facility]:  out of service area       Goals      Prevent complications post hospitalization. Patient will follow up with PCP. Pt. Will call CTN or PCP office for any questions and/or concerns.  Understands red flags post discharge. Patient will go to the nearest emergency room for chest pain, shortness of breath, returning of symptoms that brought her to the emergency room and/or worsening of symptoms. Patient will contact PCP office for any questions or concerns related to healthcare. Patient contacted regarding recent discharge and COVID-19 risk. Care Transition Nurse/ Ambulatory Care Manager contacted the patient by telephone to perform post discharge assessment. Verified name and  with patient as identifiers. Patient has following risk factors of: diabetes. CTN/ACM reviewed discharge instructions, medical action plan and red flags related to discharge diagnosis. Reviewed and educated them on any new and changed medications related to discharge diagnosis. Advised obtaining a 90-day supply of all daily and as-needed medications. Education provided regarding infection prevention, and signs and symptoms of COVID-19 and when to seek medical attention with patient who verbalized understanding. Discussed exposure protocols and quarantine from [address] you at higher risk for severe illness  and given an opportunity for questions and concerns. The patient agrees to contact the COVID-19 hotline [phone] or PCP office for questions related to their healthcare. CTN/ACM provided contact information for future reference. From CDC: Are you at higher risk for severe illness?  Wash your hands often.    Avoid close contact (6 feet, which is about two arm lengths) with people who are sick.  Put distance between yourself and other people if COVID-19 is spreading in your community.  Clean and disinfect frequently touched surfaces.  Avoid all cruise travel and non-essential air travel.  Call your healthcare professional if you have concerns about COVID-19 and your underlying condition or if you are sick.     For more information on steps you can take to protect yourself, see CDC's How to Protect Yourself

## 2020-05-27 ENCOUNTER — VIRTUAL VISIT (OUTPATIENT)
Dept: FAMILY MEDICINE CLINIC | Age: 35
End: 2020-05-27

## 2020-05-27 DIAGNOSIS — F41.9 ANXIETY: Primary | ICD-10-CM

## 2020-05-27 DIAGNOSIS — N93.8 DUB (DYSFUNCTIONAL UTERINE BLEEDING): ICD-10-CM

## 2020-05-27 DIAGNOSIS — Z09 HOSPITAL DISCHARGE FOLLOW-UP: ICD-10-CM

## 2020-05-27 NOTE — PROGRESS NOTES
Martine Olszewski is a 38 y.o. female who was seen by synchronous (real-time) audio-video technology on 5/27/2020. Consent: Martine Olszewski, who was seen by synchronous (real-time) audio-video technology, and/or her healthcare decision maker, is aware that this patient-initiated, Telehealth encounter on 5/27/2020 is a billable service, with coverage as determined by her insurance carrier. She is aware that she may receive a bill and has provided verbal consent to proceed: Yes. Assessment & Plan:   Diagnoses and all orders for this visit:    1. Anxiety    2. DUB (dysfunctional uterine bleeding)    3. Hospital discharge follow-up        I spent at least 23 minutes on this visit with this established patient. [phone]  Subjective:   Martine Olszewski is a 38 y.o. female who was seen for Hospital Follow Up    Patient recently admitted on 5/15/2020 and discharged on 5/17/2020 for the following:   Discharge diagnosis:       1. MICAELA on CKD stage  IV  2. Hyperkalemia  3. Vaginal bleeding  4. Type 2 diabetes mellitus with hyperglycemia  5. Paroxysmal atrial fibrillation  6. Hypertension    History of presenting illness: Per admitting physician      Carlos Enrique is a 38 y.o. BL[employer]  female who presents with vaginal bleeding with clots since April 26, changing her pads every 30 minutes  Patient has been having bleeding after Implanon was put in  She has been feeling dizzy, has been soaking through maxi pad every 15 to 30 minutes  She called her GYN who stated since she was getting dizzy she needs to go to the emergency room  She also has been feeling dry, has been having significant thirst  She has chronic kidney disease, used to follow with nephrologist in [address] [place] [address] but does not want to see  [facility] course:       Patient was admitted to stepdown unit. Nephrology and gynecology was consulted. Patient was transfused with 1 unit of packed RBC. Her vaginal bleeding seems stopped at this time.   Renal function is at baseline currently. Pelvic ultrasound was completed and is unremarkable. GYN recommended to continue doxycycline. We are planning removal of her Nexplanon on Wednesday. We will continue to hold Xarelto until then and patient instructed to restart after the removal.     Patient would like to go home today and will follow-up with Dr. Eugene Hernandez outpatient. Patient states she does feel better since discharge. Reports she began to have increased vaginal bleeding with her menstrual cycle since having Implanon placed in March. Patient states her Implanon was removed 5/21/2020 and has follow up 6/4/2020. Reports she does continue to have vaginal bleeding but not as heavy as it had been. States current vaginal bleeding is consistent with her normal menstrual cycle. Comments the abd bloating she was experiencing has resolved. CKD: Reports she left a message last week after she was discharged to schedule an appt with Dr. Carine Moses office. Comments she has not received a return call. Advised to call office today to follow up. Prior to Admission medications    Medication Sig Start Date End Date Taking? Authorizing Provider   busPIRone (BUSPAR) 5 mg tablet Take 1 Tab by mouth two (2) times a day. 3/11/20   Israel KENNY NP   valACYclovir (VALTREX) 1 gram tablet TAKE 1 TABLET BY MOUTH DAILY FOR 5 DAYS 3/9/20   Israel KENNY NP   oxybutynin (DITROPAN) 5 mg tablet TAKE 1 TABLET BY MOUTH DAILY 3/9/20   Israel KENNY NP   sodium bicarbonate 650 mg tablet take 1 tablet by mouth 2 times a day 1/23/20   Israel KENNY NP   metroNIDAZOLE (METROGEL) 0.75 % topical gel Apply  to affected area two (2) times a day. 12/12/19   Israel KENNY NP   omeprazole (PRILOSEC) 20 mg capsule Take 1 Cap by mouth Daily (before breakfast).  12/12/19   Israel KENNY NP   simvastatin (ZOCOR) 40 mg tablet TAKE 1 TABLET BY MOUTH NIGHTLY 9/16/19   Israel KENNY NP   pregabalin (LYRICA) 75 mg capsule Take 1 Cap by mouth three (3) times daily. 9/12/19   Genevia Ulices KENNY NP   ferrous sulfate 325 mg (65 mg iron) tablet Take 1 Tab by mouth daily. Patient taking differently: Take 325 mg by mouth as needed. 8/8/19   Genevia Ulices KENNY NP   metoprolol tartrate (LOPRESSOR) 50 mg tablet TAKE 1 TABLET BY MOUTH TWICE DAILY 3/8/19   Heidi Skelton K,    amLODIPine (NORVASC) 5 mg tablet Take 5 mg by mouth daily. Provider, Historical   insulin pump (PATIENT SUPPLIED) misc by SubCUTAneous route as needed. Provider, Historical   insulin lispro (HUMALOG) 100 unit/mL injection Given via pump. Pump set every 90 days in provider's office. Provider, Historical   Blood-Glucose Meter monitoring kit by Does Not Apply route two (2) times a day. 7/9/10   Jojo Foreman MD   glucose blood VI test strips (ONE TOUCH ULTRA TEST) strip by Does Not Apply route.  One touch ultra mini test strips 7/7/10   Jojo Foreman MD     Allergies   Allergen Reactions    Latex Hives    Contrast Agent [Iodine] Other (comments)     \"Burning with shooting pain\"   Lo Franz [Nitrofurantoin Monohyd/M-Cryst] Diarrhea    Novolog Mix 70-30 [Insulin Asp Prt-Insulin Aspart] Nausea and Vomiting       Patient Active Problem List   Diagnosis Code    Neuropathy G62.9    Diabetes mellitus ([facility] E11.9    Eye problems H57.9    Toe pain M79.676    Hypercholesteremia E78.00    Diabetic gastroparesis associated with type 2 diabetes mellitus (HCC) E11.43, K31.84    Hypovitaminosis D E55.9    Paroxysmal atrial fibrillation (HCC) I48.0    Chronic anticoagulation Z79.01    Transient cerebral ischemia G45.9    Renal insufficiency N28.9    Bad odor of urine R82.90    Type 2 diabetes mellitus with stage 4 chronic kidney disease, with long-term current use of insulin ([facility]) E11.22, N18.4, Z79.4    Encephalopathy acute G93.40    CKD (chronic kidney disease) stage 3, GFR 30-59 ml/min ([facility]) N18.3    Essential hypertension I10    Chronic renal impairment, stage 4 (severe) ([facility] N18.4    Acute hyperkalemia E87.5    Acute blood loss anemia D62    PAF (paroxysmal atrial fibrillation) ([facility]) I48.0    Abnormal vaginal bleeding N93.9    Acute kidney injury superimposed on chronic kidney disease ([facility] N17.9, N18.9     Patient Active Problem List    Diagnosis Date Noted    Acute hyperkalemia 05/15/2020    Acute blood loss anemia 05/15/2020    PAF (paroxysmal atrial fibrillation) ([facility] 05/15/2020    Abnormal vaginal bleeding 05/15/2020    Acute kidney injury superimposed on chronic kidney disease ([facility] 05/15/2020    CKD (chronic kidney disease) stage 3, GFR 30-59 ml/min ([facility])     Essential hypertension 12/11/2019    Chronic renal impairment, stage 4 (severe) ([facility] 12/11/2019    Encephalopathy acute 01/03/2019    Type 2 diabetes mellitus with stage 4 chronic kidney disease, with long-term current use of insulin ([facility] 08/14/2018    Bad odor of urine 10/26/2017    Paroxysmal atrial fibrillation ([facility] 04/08/2017    Chronic anticoagulation 04/08/2017    Transient cerebral ischemia 04/08/2017    Renal insufficiency 04/08/2017    Hypovitaminosis D 04/26/2012    Diabetic gastroparesis associated with type 2 diabetes mellitus ([facility] 02/24/2012    Hypercholesteremia 06/21/2010    Neuropathy 05/20/2010    Diabetes mellitus ([facility] 05/20/2010    Eye problems 05/20/2010    Toe pain 05/20/2010     Current Outpatient Medications   Medication Sig Dispense Refill    busPIRone (BUSPAR) 5 mg tablet Take 1 Tab by mouth two (2) times a day. 60 Tab 2    valACYclovir (VALTREX) 1 gram tablet TAKE 1 TABLET BY MOUTH DAILY FOR 5 DAYS 5 Tab 5    oxybutynin (DITROPAN) 5 mg tablet TAKE 1 TABLET BY MOUTH DAILY 30 Tab 2    sodium bicarbonate 650 mg tablet take 1 tablet by mouth 2 times a day 60 Tab 2    metroNIDAZOLE (METROGEL) 0.75 % topical gel Apply  to affected area two (2) times a day. 60 g 0    omeprazole (PRILOSEC) 20 mg capsule Take 1 Cap by mouth Daily (before breakfast).  90 Cap 0    simvastatin (ZOCOR) 40 mg tablet TAKE 1 TABLET BY MOUTH NIGHTLY 30 Tab 6    pregabalin (LYRICA) 75 mg capsule Take 1 Cap by mouth three (3) times daily. 90 Cap 3    ferrous sulfate 325 mg (65 mg iron) tablet Take 1 Tab by mouth daily. (Patient taking differently: Take 325 mg by mouth as needed.) 30 Tab 2    metoprolol tartrate (LOPRESSOR) 50 mg tablet TAKE 1 TABLET BY MOUTH TWICE DAILY 180 Tab 6    amLODIPine (NORVASC) 5 mg tablet Take 5 mg by mouth daily.  insulin pump (PATIENT SUPPLIED) misc by SubCUTAneous route as needed.  insulin lispro (HUMALOG) 100 unit/mL injection Given via pump. Pump set every 90 days in provider's office.  Blood-Glucose Meter monitoring kit by Does Not Apply route two (2) times a day. 1 Kit 0    glucose blood VI test strips (ONE TOUCH ULTRA TEST) strip by Does Not Apply route. One touch ultra mini test strips 1 Package 11     Allergies   Allergen Reactions    Latex Hives    Contrast Agent [Iodine] Other (comments)     \"Burning with shooting pain\"    Macrobid [Nitrofurantoin Monohyd/M-Cryst] Diarrhea    Novolog Mix 70-30 [Insulin Asp Prt-Insulin Aspart] Nausea and Vomiting     Past Medical History:   Diagnosis Date    Cardiac echocardiogram 09/19/2016    [facility]:  Tech difficult. Pt refused 2nd attempt at bubble study. EF 60%. No WMA. Mild conc LVH. RVSP 20 mmHg. No atrial shunting by Doppler.  Cardiovascular lower extremity venous duplex 06/20/2016    No DVT bilaterally.  Carotid duplex 09/19/2016    Mild < 50% bilateral ICA stenosis.     Cerebral artery occlusion with cerebral infarction (HCC)     Chronic anticoagulation     Xarelto    CKD (chronic kidney disease) stage 3, GFR 30-59 ml/min (HCC)     Diabetes ([address]     Diabetic gastroparesis associated with type 2 diabetes mellitus ([address] 2/24/2012    Diabetic neuropathy ([address]     Diabetic retinopathy     Hypercholesterolemia     Hypertension     Hypovitaminosis D 4/26/2012    Currently on vitamin d therapy     PAF (paroxysmal atrial fibrillation) ([facility]     Transient cerebral ischemia 4/8/2017    Type 2 diabetes mellitus with stage 4 chronic kidney disease, with long-term current use of insulin ([facility] 8/14/2018     Past Surgical History:   Procedure Laterality Date    HX ORTHOPAEDIC  January 2013    right toe nail surgery Dr. Jane Hodges     Family History   Problem Relation Age of Onset    Diabetes Mother     Cancer Paternal Grandmother      Social History     Tobacco Use    Smoking status: Never Smoker    Smokeless tobacco: Never Used   Substance Use Topics    Alcohol use: No       ROS      Objective: There were no vitals taken for this visit. General: alert, cooperative, no distress   Mental  status: normal mood, behavior, speech, dress, motor activity, and thought processes, able to follow commands   HENT: NCAT   Neck: no visualized mass   Resp: no respiratory distress   Neuro: no gross deficits   Skin: no discoloration or lesions of concern on visible areas   Psychiatric: normal affect, consistent with stated mood, no evidence of hallucinations     Additional exam findings: We discussed the expected course, resolution and complications of the diagnosis(es) in detail. Medication risks, benefits, costs, interactions, and alternatives were discussed as indicated. I advised her to contact the office if her condition worsens, changes or fails to improve as anticipated. She expressed understanding with the diagnosis(es) and plan. Cesar Hernández is a 38 y.o. female who was evaluated by a video visit encounter for concerns as above. Patient identification was verified prior to start of the visit. A caregiver was present when appropriate. Due to this being a TeleHealth encounter (During [facility] public health emergency), evaluation of the following organ systems was limited: Vitals/Constitutional/EENT/Resp/CV/GI//MS/Neuro/Skin/Heme-Lymph-Imm.   Pursuant to the emergency declaration under the [facility] waiver authority and the [employer] Act, this Virtual  Visit was conducted, with patient's (and/or legal guardian's) consent, to reduce the patient's risk of exposure to COVID-19 and provide necessary medical care. Services were provided through a video synchronous discussion virtually to substitute for in-person clinic visit. Patient was located in their home and I was in the office while conducting this encounter.     Asya Sexton NP

## 2020-05-28 ENCOUNTER — PATIENT OUTREACH (OUTPATIENT)
Dept: CASE MANAGEMENT | Age: 35
End: 2020-05-28

## 2020-06-01 ENCOUNTER — DOCUMENTATION ONLY (OUTPATIENT)
Dept: FAMILY MEDICINE CLINIC | Age: 35
End: 2020-06-01

## 2020-06-01 RX ORDER — METOPROLOL TARTRATE 50 MG/1
TABLET ORAL
Qty: 180 TAB | Refills: 6 | Status: SHIPPED | OUTPATIENT
Start: 2020-06-01 | End: 2021-08-06 | Stop reason: SDUPTHER

## 2020-06-04 ENCOUNTER — OFFICE VISIT (OUTPATIENT)
Dept: CARDIOLOGY CLINIC | Age: 35
End: 2020-06-04

## 2020-06-04 VITALS
BODY MASS INDEX: 35.15 KG/M2 | DIASTOLIC BLOOD PRESSURE: 84 MMHG | OXYGEN SATURATION: 98 % | WEIGHT: 191 LBS | HEIGHT: 62 IN | HEART RATE: 90 BPM | SYSTOLIC BLOOD PRESSURE: 130 MMHG

## 2020-06-04 DIAGNOSIS — N18.30 CKD (CHRONIC KIDNEY DISEASE) STAGE 3, GFR 30-59 ML/MIN (HCC): ICD-10-CM

## 2020-06-04 DIAGNOSIS — I10 ESSENTIAL HYPERTENSION WITH GOAL BLOOD PRESSURE LESS THAN 130/80: ICD-10-CM

## 2020-06-04 DIAGNOSIS — N93.9 VAGINA BLEEDING: ICD-10-CM

## 2020-06-04 DIAGNOSIS — I48.0 PAROXYSMAL ATRIAL FIBRILLATION (HCC): Primary | ICD-10-CM

## 2020-06-04 NOTE — PROGRESS NOTES
Dwight Gonsalez    Chief Complaint   Patient presents with    Irregular Heart Beat     6 MONTH F/U    Leg Swelling     feet/ankles since yesterday   f/u HTN, pAF, HFpEF    HPI    Dwight Gonsalez is a 38 y.o. young -American female with CKD, DM, here for follow-up of pAF and HTN, c/o SOB and palpitations. As you know patient used to follow with my late partner for nonvalvular paroxsymal atrial fibrillation that was diagnosed by event monitor ~2017. Despite being so young, she has a high enough CHADsVASC score (DM, HTN) and apparent history of recurrent TIA that he initiated renally dosed systemic anticoagulation for stroke prophylaxis. She follows with a kidney doctor for CKD. At [facility] after being found down by her son. He said this was found to be due to her diabetes and she had severe hypoglycemia. During her hospitalization she had an echocardiogram 1/4/19 that noted normal LV function at 66%, significant valvular pathology and no effusions. Mitral valve leaflets was noted to be a bit sclerotic but it did not affect its mobility or function. Personally obtained and reviewed these records with patient during our encounter today. she does have paroxysms of pAF and is mostly asymptomatic but does notice that particular when she is laying down at night to go to sleep. We sent her for a sleep study and she was found to have insomnia but no SHIRLEY. Otherwise no CP, doesn't feel her heart racing. She takes Lasix as needed- last took a couple days ago. She was having major issues with her BP- we recognized this was likely related to stress/ untreated anxiety. She started on Buspar- and even low dose she feels incredibly better. Her SBP is well controlled today. No complaints. She does admit last week she ate pizza that caused her to have some LE edema but that resolved. Since I last saw her she apparently was hospitalized due to vag bleeding.  They think it was hormonal due to birth control but she has follow up today. Hx taken from pt. Cardiac RFs: DM (dx age 21, type 1 vs type 2?), HTN, HL, TIAs? Past Medical History:   Diagnosis Date    Cardiac echocardiogram 09/19/2016    [facility]:  Tech difficult. Pt refused 2nd attempt at bubble study. EF 60%. No WMA. Mild conc LVH. RVSP 20 mmHg. No atrial shunting by Doppler.  Cardiovascular lower extremity venous duplex 06/20/2016    No DVT bilaterally.  Carotid duplex 09/19/2016    Mild < 50% bilateral ICA stenosis.  Cerebral artery occlusion with cerebral infarction (HCC)     Chronic anticoagulation     Xarelto    CKD (chronic kidney disease) stage 3, GFR 30-59 ml/min ([facility])     Diabetes ([address]     Diabetic gastroparesis associated with type 2 diabetes mellitus ([address] 2/24/2012    Diabetic neuropathy ([address]     Diabetic retinopathy     Hypercholesterolemia     Hypertension     Hypovitaminosis D 4/26/2012    Currently on vitamin d therapy     PAF (paroxysmal atrial fibrillation) ([address]     Transient cerebral ischemia 4/8/2017    Type 2 diabetes mellitus with stage 4 chronic kidney disease, with long-term current use of insulin ([address] 8/14/2018       Past Surgical History:   Procedure Laterality Date    HX ORTHOPAEDIC  January 2013    right toe nail surgery Dr. Reymundo Rasmussen       Current Outpatient Medications   Medication Sig Dispense Refill    FUROSEMIDE PO Take  by mouth.  rivaroxaban (Xarelto) 15 mg tab tablet Take  by mouth daily.  metoprolol tartrate (LOPRESSOR) 50 mg tablet TAKE 1 TABLET BY MOUTH TWICE DAILY 180 Tab 6    busPIRone (BUSPAR) 5 mg tablet Take 1 Tab by mouth two (2) times a day.  60 Tab 2    valACYclovir (VALTREX) 1 gram tablet TAKE 1 TABLET BY MOUTH DAILY FOR 5 DAYS 5 Tab 5    oxybutynin (DITROPAN) 5 mg tablet TAKE 1 TABLET BY MOUTH DAILY 30 Tab 2    sodium bicarbonate 650 mg tablet take 1 tablet by mouth 2 times a day 60 Tab 2    metroNIDAZOLE (METROGEL) 0.75 % topical gel Apply  to affected area two (2) times a day. 60 g 0    omeprazole (PRILOSEC) 20 mg capsule Take 1 Cap by mouth Daily (before breakfast). 90 Cap 0    simvastatin (ZOCOR) 40 mg tablet TAKE 1 TABLET BY MOUTH NIGHTLY 30 Tab 6    pregabalin (LYRICA) 75 mg capsule Take 1 Cap by mouth three (3) times daily. 90 Cap 3    ferrous sulfate 325 mg (65 mg iron) tablet Take 1 Tab by mouth daily. (Patient taking differently: Take 325 mg by mouth as needed.) 30 Tab 2    amLODIPine (NORVASC) 5 mg tablet Take 5 mg by mouth daily.  insulin pump (PATIENT SUPPLIED) misc by SubCUTAneous route as needed.  insulin lispro (HUMALOG) 100 unit/mL injection Given via pump. Pump set every 90 days in provider's office.  Blood-Glucose Meter monitoring kit by Does Not Apply route two (2) times a day. 1 Kit 0    glucose blood VI test strips (ONE TOUCH ULTRA TEST) strip by Does Not Apply route.  One touch ultra mini test strips 1 Package 11       Allergies   Allergen Reactions    Latex Hives    Contrast Agent [Iodine] Other (comments)     \"Burning with shooting pain\"    Macrobid [Nitrofurantoin Monohyd/M-Cryst] Diarrhea    Novolog Mix 70-30 [Insulin Asp Prt-Insulin Aspart] Nausea and Vomiting       Social History     Socioeconomic History    Marital status: SINGLE     Spouse name: Not on file    Number of children: Not on file    Years of education: Not on file    Highest education level: Not on file   Occupational History     Comment: applying for disability (diabetes)   Social Needs    Financial resource strain: Not on file    Food insecurity     Worry: Not on file     Inability: Not on file   [employer] needs     Medical: Not on file     Non-medical: Not on file   Tobacco Use    Smoking status: Never Smoker    Smokeless tobacco: Never Used   Substance and Sexual Activity    Alcohol use: No    Drug use: No    Sexual activity: Never   Lifestyle    Physical activity     Days per week: Not on file     Minutes per session: Not on file    Stress: Not on file   Relationships    Social connections     Talks on phone: Not on file     Gets together: Not on file     Attends [employer] service: Not on file     Active member of club or organization: Not on file     Attends meetings of clubs or organizations: Not on file     Relationship status: Not on file    Intimate partner violence     Fear of current or ex partner: Not on file     Emotionally abused: Not on file     Physically abused: Not on file     Forced sexual activity: Not on file   Other Topics Concern    Not on file   Social History Narrative    Not on file        FH negative for premature CAD and SCD. Review of Systems    14 pt Review of Systems is negative unless otherwise mentioned in the HPI. Wt Readings from Last 3 Encounters:   06/04/20 86.6 kg (191 lb)   05/17/20 86 kg (189 lb 9.5 oz)   02/19/20 81.2 kg (179 lb)     Temp Readings from Last 3 Encounters:   05/17/20 98.6 °F (37 °C)   01/23/20 98.3 °F (36.8 °C) (Oral)   12/12/19 98 °F (36.7 °C) (Oral)     BP Readings from Last 3 Encounters:   06/04/20 130/84   05/17/20 127/88   02/19/20 132/84     Pulse Readings from Last 3 Encounters:   06/04/20 90   05/17/20 98   02/19/20 81     Physical Exam:    Visit Vitals  /84   Pulse 90   Ht 5' 2\" (1.575 m)   Wt 86.6 kg (191 lb)   SpO2 98%   BMI 34.93 kg/m²      Physical Exam  Constitutional:       Appearance: She is well-developed. HENT:      Head: Normocephalic and atraumatic. Eyes:      Pupils: Pupils are equal, round, and reactive to light. Neck:      Vascular: No JVD. Cardiovascular:      Rate and Rhythm: Normal rate and regular rhythm. Heart sounds: Normal heart sounds. No murmur. No friction rub. No gallop. Pulmonary:      Effort: Pulmonary effort is normal. No respiratory distress. Breath sounds: Normal breath sounds. No wheezing or rales. Chest:      Chest wall: No tenderness.    Abdominal:      General: Bowel sounds are normal. Palpations: Abdomen is soft. Musculoskeletal:         General: No tenderness. Skin:     General: Skin is warm and dry. Neurological:      Mental Status: She is alert and oriented to person, place, and time. EKG last: NSR, normal axis and intervals, no ST segment abnormalities    Lab Results   Component Value Date/Time    TSH 2.140 07/11/2019 12:03 PM     Lab Results   Component Value Date/Time    Cholesterol, total 183 07/11/2019 12:03 PM    Cholesterol (POC) 267 01/25/2013 12:05 PM    HDL Cholesterol 48 07/11/2019 12:03 PM    HDL Cholesterol (POC) 37 01/25/2013 12:05 PM    LDL Cholesterol (POC) 185 01/25/2013 12:05 PM    LDL, calculated 114 (H) 07/11/2019 12:03 PM    VLDL, calculated 21 07/11/2019 12:03 PM    Triglyceride 103 07/11/2019 12:03 PM    Triglycerides (POC) 225 01/25/2013 12:05 PM    CHOL/HDL Ratio 5.2 (H) 02/09/2017 10:00 AM     Lab Results   Component Value Date/Time    Sodium 139 05/17/2020 04:18 AM    Potassium 5.0 05/17/2020 04:18 AM    Chloride 114 (H) 05/17/2020 04:18 AM    CO2 14 (LL) 05/17/2020 04:18 AM    Anion gap 10 05/17/2020 04:18 AM    Glucose 147 (H) 05/17/2020 04:18 AM    BUN 44 (H) 05/17/2020 04:18 AM    Creatinine 3.2 (H) 05/17/2020 04:18 AM    BUN/Creatinine ratio 14 07/11/2019 12:03 PM    GFR est AA 21.0 05/17/2020 04:18 AM    GFR est non-AA 17 05/17/2020 04:18 AM    Calcium 8.3 (L) 05/17/2020 04:18 AM    Bilirubin, total 0.3 05/15/2020 06:05 PM    Alk.  phosphatase 96 05/15/2020 06:05 PM    Protein, total 7.9 05/15/2020 06:05 PM    Albumin 2.5 (L) 05/17/2020 04:18 AM    A-G Ratio 1.2 07/11/2019 12:03 PM    ALT (SGPT) 20 05/15/2020 06:05 PM     Lab Results   Component Value Date/Time    Cholesterol, total 183 07/11/2019 12:03 PM    Cholesterol (POC) 267 01/25/2013 12:05 PM    HDL Cholesterol 48 07/11/2019 12:03 PM    HDL Cholesterol (POC) 37 01/25/2013 12:05 PM    LDL Cholesterol (POC) 185 01/25/2013 12:05 PM    LDL, calculated 114 (H) 07/11/2019 12:03 PM    VLDL, calculated 21 07/11/2019 12:03 PM    Triglyceride 103 07/11/2019 12:03 PM    Triglycerides (POC) 225 01/25/2013 12:05 PM    CHOL/HDL Ratio 5.2 (H) 02/09/2017 10:00 AM       Impression and Plan:  Randi Foster is a 38 y.o. with:    1.) nonvalvular pAF, currently NSR, ChadsVasc ~5  2.) diabetes, Type 1?  3.) HTN, avg SBP goal <140s, prior h/o HTN urgency, now well controlled  4.) Hyperlipidemia, LDL  5.) H/o TIAs/ CVA?  6.) CKD, known  7.) Normal LV function/ no structural heart disease based on echo 1/2019  8.) Obesity based on BMI 33  9.) acute on chronic anemia, with vaginal bleeding    1.) Continue rate control strategy with metoprolol 50 BID, seems to be doing well on current dose, can take an additional 50 mg if SBP >180 and and HR >60  2.) Continue Xarelto systemic anticoagulation stroke ppx- pt restarted after vag bleeding, has follow up with GYN, no bleeding despite restarting a few days ago, continue close monitoring  3.) Low sodium diet education dw pt, can continue Lasix prn  4.) My concern is her kidney function, we may not be able to use Xarelto in future, her last GFR was in setting of anemia etc. She has appt with Neprho in a couple weeks  5.) Overall complex med hx despite young age, stable and overall well from my standpoint    SBP dramatically improved  In NSR, on medical therapy  Call me sooner if bleeding    Thank you for allowing me to participate in the care of your patient, please do not hesitate to call with questions or concerns.     Kindest Regards,    Irish Edward, DO

## 2020-06-04 NOTE — PROGRESS NOTES
Hiral Quezada presents today for   Chief Complaint   Patient presents with    Irregular Heart Beat     6 MONTH F/U    Leg Swelling     feet/ankles since yesterday       Hiral Quezada preferred language for health care discussion is english/other. Is someone accompanying this pt? no    Is the patient using any DME equipment during [address]? no    Depression Screening:  3 most recent PHQ Screens 1/23/2020   Little interest or pleasure in doing things Not at all   Feeling down, depressed, irritable, or hopeless Not at all   Total Score PHQ 2 0   Trouble falling or staying asleep, or sleeping too much Not at all   Feeling tired or having little energy Nearly every day   Poor appetite, weight loss, or overeating Not at all   Feeling bad about yourself - or that you are a failure or have let yourself or your family down Not at all   Trouble concentrating on things such as school, work, reading, or watching TV Not at all   Moving or speaking so slowly that other people could have noticed; or the opposite being so fidgety that others notice Not at all   Thoughts of being better off dead, or hurting yourself in some way Not at all   PHQ 9 Score 3   How difficult have these problems made it for you to do your work, take care of your home and get along with others Somewhat difficult       Learning Assessment:  Learning Assessment 1/23/2020   PRIMARY LEARNER Patient   HIGHEST LEVEL OF EDUCATION - PRIMARY LEARNER  2 YEARS OF COLLEGE   BARRIERS PRIMARY LEARNER NONE   CO-LEARNER CAREGIVER No   PRIMARY LANGUAGE ENGLISH   LEARNER PREFERENCE PRIMARY DEMONSTRATION     -   ANSWERED BY self   RELATIONSHIP SELF       Abuse Screening:  Abuse Screening Questionnaire 5/13/2015   Do you ever feel afraid of your partner? N   Are you in a relationship with someone who physically or mentally threatens you? N   Is it safe for you to go home? Y       Fall Risk  No flowsheet data found. Pt currently taking Anticoagulant therapy? yes    Coordination of Care:  1. Have you been to the ER, urgent care clinic since your last visit? Hospitalized since your last visit? yes    2. Have you seen or consulted any other health care providers outside of the [address] [place] [address] since your last visit? Include any pap smears or colon screening.  no

## 2020-06-09 ENCOUNTER — PATIENT OUTREACH (OUTPATIENT)
Dept: CASE MANAGEMENT | Age: 35
End: 2020-06-09

## 2020-06-09 NOTE — PROGRESS NOTES
Follow-Up      Date/Time:  6/9/2020 2:53 PM    Patient was admitted to [facility] on 5/15/20 and discharged on  for vaginal bleeding, CKD. CTN  Attempt to contact patient via telephone on 6/9/20 for  follow up. Unable to reach. Left message on voicemail with office contact information. No Patient medical information left on message.     [facility] follow up appointment(s):   Future Appointments   Date Time Provider Brian Clifford   9/10/2020 11:20 AM Malena Lizama DO [address] [place] [address]

## 2020-06-24 RX ORDER — METRONIDAZOLE 7.5 MG/G
GEL TOPICAL 2 TIMES DAILY
Qty: 60 G | Refills: 0 | Status: SHIPPED | OUTPATIENT
Start: 2020-06-24 | End: 2020-08-06 | Stop reason: SDUPTHER

## 2020-06-30 ENCOUNTER — PATIENT OUTREACH (OUTPATIENT)
Dept: CASE MANAGEMENT | Age: 35
End: 2020-06-30

## 2020-06-30 NOTE — PROGRESS NOTES
Follow-Up      Date/Time:  6/30/2020 4:14 PM    Patient was admitted to [facility] on 5/15/20 and discharged on  5/17/20 for vaginal bleeding, CKD. Attempt to contact patient via telephone on 6/30/20 for follow up, Unable to reach. Left message on voicemail with office contact information. No Patient medical information left on message. Patient has graduated from the Transitions of Care Coordination  program on 6/30/20. No further nurse navigator follow up scheduled. Noted no hospitalization admission post 30 days from discharge date 5/17/20. This episode is closed. Post Hospitalization Encounter Resolved. No Care Transition Nurse follow up scheduled.        [facility] follow up appointment(s):   Future Appointments   Date Time Provider Brian Clifford   9/10/2020 11:20 AM Nia Bejarano DO [address] [place] [address]

## 2020-07-13 RX ORDER — OXYBUTYNIN CHLORIDE 5 MG/1
5 TABLET ORAL DAILY
Qty: 30 TAB | Refills: 2 | Status: SHIPPED | OUTPATIENT
Start: 2020-07-13 | End: 2020-10-13 | Stop reason: SDUPTHER

## 2020-07-13 NOTE — TELEPHONE ENCOUNTER
Last Visit: 5/27/20 with KEHINDE Hamilton  Next Appointment: Advised to follow-up in 2 months  Previous Refill Encounter(s): 3/9/20 #30 with 2 refills    Requested Prescriptions     Pending Prescriptions Disp Refills    oxybutynin (DITROPAN) 5 mg tablet 30 Tab 2     Sig: Take 1 Tab by mouth daily.

## 2020-07-22 DIAGNOSIS — I10 ESSENTIAL HYPERTENSION: ICD-10-CM

## 2020-07-24 RX ORDER — IRBESARTAN 150 MG/1
TABLET ORAL
Qty: 90 TAB | Refills: 3 | Status: SHIPPED | OUTPATIENT
Start: 2020-07-24 | End: 2021-12-19

## 2020-08-06 ENCOUNTER — VIRTUAL VISIT (OUTPATIENT)
Dept: FAMILY MEDICINE CLINIC | Age: 35
End: 2020-08-06

## 2020-08-06 DIAGNOSIS — F41.9 ANXIETY: ICD-10-CM

## 2020-08-06 DIAGNOSIS — Z79.4 TYPE 2 DIABETES MELLITUS WITH COMPLICATION, WITH LONG-TERM CURRENT USE OF INSULIN (HCC): ICD-10-CM

## 2020-08-06 DIAGNOSIS — I10 ESSENTIAL HYPERTENSION: Primary | ICD-10-CM

## 2020-08-06 DIAGNOSIS — E11.8 TYPE 2 DIABETES MELLITUS WITH COMPLICATION, WITH LONG-TERM CURRENT USE OF INSULIN (HCC): ICD-10-CM

## 2020-08-06 DIAGNOSIS — E78.00 PURE HYPERCHOLESTEROLEMIA: ICD-10-CM

## 2020-08-06 RX ORDER — METRONIDAZOLE 7.5 MG/G
GEL TOPICAL 2 TIMES DAILY
Qty: 60 G | Refills: 0 | Status: ON HOLD | OUTPATIENT
Start: 2020-08-06 | End: 2021-07-04

## 2020-08-06 NOTE — PROGRESS NOTES
Pablo Crawford is a 38 y.o. female who was seen by synchronous (real-time) audio-video technology on 8/6/2020 for Hypertension; Diabetes; and Anxiety      Assessment & Plan:   Diagnoses and all orders for this visit:    1. Essential hypertension    2. Anxiety    3. Pure hypercholesterolemia  -     LIPID PANEL; Future  -     METABOLIC PANEL, COMPREHENSIVE; Future    4. Type 2 diabetes mellitus with complication, with long-term current use of insulin (HCC)  -     HEMOGLOBIN A1C WITH EAG; Future  -     LIPID PANEL; Future  -     METABOLIC PANEL, COMPREHENSIVE; Future    Other orders  -     metroNIDAZOLE (METROGEL) 0.75 % topical gel; Apply  to affected area two (2) times a day. -     insulin syringe-needle U-100 1 mL 32 gauge x 5/16\" syrg; 1 Syringe by Does Not Apply route as needed (to use with humlin). Follow-up and Dispositions    · Return in about 3 months (around 11/6/2020), or if symptoms worsen or fail to improve, for HTN/HLD/DM, in office follow up. I spent at least 15 minutes on this visit with this established patient. 712  Subjective:   Hypertension ROS: taking medications as instructed, no medication side effects noted, no TIA's, no chest pain on exertion, no dyspnea on exertion, noting swelling of ankles. Reports within a 3 week time frame she was having intermittent swelling, improved with taking lasix. Diabetic Review of Systems - medication compliance: compliant all of the time, diet compliance: noncompliant some of the time, home glucose monitoring: is performed regularly, fasting values range 200s, 208-220 after eating, further ROS: no chest pain, dyspnea or TIA's, no numbness, tingling or pain in extremities. Reports has not been seen by endocrinology since 12/2020. Patient followed up Dr. Cory Schirmer last week via televisit. Comments she is going to have labs drawn. No medication adjustments were made at this time.   Patient states she has an appt with a new cardiologist Dr. [facility] 8/12/2020. Prior to Admission medications    Medication Sig Start Date End Date Taking? Authorizing Provider   irbesartan (AVAPRO) 150 mg tablet TAKE 1 TABLET BY MOUTH DAILY FOR BLOOD PRESSURE 7/24/20   Hortensia Jara MD   oxybutynin (DITROPAN) 5 mg tablet Take 1 Tab by mouth daily. 7/13/20   Amor KENNY NP   metroNIDAZOLE (METROGEL) 0.75 % topical gel Apply  to affected area two (2) times a day. 6/24/20   Amor KENNY NP   FUROSEMIDE PO Take  by mouth. Provider, Historical   rivaroxaban (Xarelto) 15 mg tab tablet Take 1 Tab by mouth daily. 6/4/20   Asmita HERNÁNDEZ DO   metoprolol tartrate (LOPRESSOR) 50 mg tablet TAKE 1 TABLET BY MOUTH TWICE DAILY 6/1/20   Suzette Soto NP   busPIRone (BUSPAR) 5 mg tablet Take 1 Tab by mouth two (2) times a day. 3/11/20   Amor KENNY NP   valACYclovir (VALTREX) 1 gram tablet TAKE 1 TABLET BY MOUTH DAILY FOR 5 DAYS 3/9/20   Amor KENNY NP   sodium bicarbonate 650 mg tablet take 1 tablet by mouth 2 times a day 1/23/20   Amor KENNY NP   omeprazole (PRILOSEC) 20 mg capsule Take 1 Cap by mouth Daily (before breakfast). 12/12/19   Amor KENNY NP   simvastatin (ZOCOR) 40 mg tablet TAKE 1 TABLET BY MOUTH NIGHTLY 9/16/19   Amor KENNY NP   pregabalin (LYRICA) 75 mg capsule Take 1 Cap by mouth three (3) times daily. 9/12/19   Amor KENNY NP   ferrous sulfate 325 mg (65 mg iron) tablet Take 1 Tab by mouth daily. Patient taking differently: Take 325 mg by mouth as needed. 8/8/19   Amor KENNY NP   amLODIPine (NORVASC) 5 mg tablet Take 5 mg by mouth daily. Provider, Historical   insulin pump (PATIENT SUPPLIED) misc by SubCUTAneous route as needed. Provider, Historical   insulin lispro (HUMALOG) 100 unit/mL injection Given via pump. Pump set every 90 days in provider's office. Provider, Historical   Blood-Glucose Meter monitoring kit by Does Not Apply route two (2) times a day.  7/9/10   Sheng Ramírez MD glucose blood VI test strips (ONE TOUCH ULTRA TEST) strip by Does Not Apply route.  One touch ultra mini test strips 7/7/10   Alexandrea Boyce MD     Patient Active Problem List   Diagnosis Code    Neuropathy G62.9    Diabetes mellitus ([facility] E11.9    Eye problems H57.9    Toe pain M79.676    Hypercholesteremia E78.00    Diabetic gastroparesis associated with type 2 diabetes mellitus ([address] E11.43, K31.84    Hypovitaminosis D E55.9    Paroxysmal atrial fibrillation ([facility]) I48.0    Chronic anticoagulation Z79.01    Transient cerebral ischemia G45.9    Renal insufficiency N28.9    Bad odor of urine R82.90    Type 2 diabetes mellitus with stage 4 chronic kidney disease, with long-term current use of insulin ([facility]) E11.22, N18.4, Z79.4    Encephalopathy acute G93.40    CKD (chronic kidney disease) stage 3, GFR 30-59 ml/min ([facility]) N18.3    Essential hypertension I10    Chronic renal impairment, stage 4 (severe) ([facility]) N18.4    Acute hyperkalemia E87.5    Acute blood loss anemia D62    PAF (paroxysmal atrial fibrillation) ([facility]) I48.0    Abnormal vaginal bleeding N93.9    Acute kidney injury superimposed on chronic kidney disease ([facility]) N17.9, N18.9     Patient Active Problem List    Diagnosis Date Noted    Acute hyperkalemia 05/15/2020    Acute blood loss anemia 05/15/2020    PAF (paroxysmal atrial fibrillation) ([facility]) 05/15/2020    Abnormal vaginal bleeding 05/15/2020    Acute kidney injury superimposed on chronic kidney disease ([facility] 05/15/2020    CKD (chronic kidney disease) stage 3, GFR 30-59 ml/min ([facility])     Essential hypertension 12/11/2019    Chronic renal impairment, stage 4 (severe) ([address] 12/11/2019    Encephalopathy acute 01/03/2019    Type 2 diabetes mellitus with stage 4 chronic kidney disease, with long-term current use of insulin ([address] 08/14/2018    Bad odor of urine 10/26/2017    Paroxysmal atrial fibrillation (HCC) 04/08/2017    Chronic anticoagulation 04/08/2017    Transient cerebral ischemia 04/08/2017    Renal insufficiency 04/08/2017    Hypovitaminosis D 04/26/2012    Diabetic gastroparesis associated with type 2 diabetes mellitus ([facility] 02/24/2012    Hypercholesteremia 06/21/2010    Neuropathy 05/20/2010    Diabetes mellitus ([facility] 05/20/2010    Eye problems 05/20/2010    Toe pain 05/20/2010     Current Outpatient Medications   Medication Sig Dispense Refill    irbesartan (AVAPRO) 150 mg tablet TAKE 1 TABLET BY MOUTH DAILY FOR BLOOD PRESSURE 90 Tab 3    oxybutynin (DITROPAN) 5 mg tablet Take 1 Tab by mouth daily. 30 Tab 2    metroNIDAZOLE (METROGEL) 0.75 % topical gel Apply  to affected area two (2) times a day. 60 g 0    FUROSEMIDE PO Take  by mouth.  rivaroxaban (Xarelto) 15 mg tab tablet Take 1 Tab by mouth daily. 30 Tab 5    metoprolol tartrate (LOPRESSOR) 50 mg tablet TAKE 1 TABLET BY MOUTH TWICE DAILY 180 Tab 6    busPIRone (BUSPAR) 5 mg tablet Take 1 Tab by mouth two (2) times a day. 60 Tab 2    valACYclovir (VALTREX) 1 gram tablet TAKE 1 TABLET BY MOUTH DAILY FOR 5 DAYS 5 Tab 5    sodium bicarbonate 650 mg tablet take 1 tablet by mouth 2 times a day 60 Tab 2    omeprazole (PRILOSEC) 20 mg capsule Take 1 Cap by mouth Daily (before breakfast). 90 Cap 0    simvastatin (ZOCOR) 40 mg tablet TAKE 1 TABLET BY MOUTH NIGHTLY 30 Tab 6    pregabalin (LYRICA) 75 mg capsule Take 1 Cap by mouth three (3) times daily. 90 Cap 3    ferrous sulfate 325 mg (65 mg iron) tablet Take 1 Tab by mouth daily. (Patient taking differently: Take 325 mg by mouth as needed.) 30 Tab 2    amLODIPine (NORVASC) 5 mg tablet Take 5 mg by mouth daily.  insulin pump (PATIENT SUPPLIED) misc by SubCUTAneous route as needed.  insulin lispro (HUMALOG) 100 unit/mL injection Given via pump. Pump set every 90 days in provider's office.  Blood-Glucose Meter monitoring kit by Does Not Apply route two (2) times a day.  1 Kit 0    glucose blood VI test strips (ONE TOUCH ULTRA TEST) strip by Does Not Apply route. One touch ultra mini test strips 1 Package 11     Allergies   Allergen Reactions    Latex Hives    Contrast Agent [Iodine] Other (comments)     \"Burning with shooting pain\"    Macrobid [Nitrofurantoin Monohyd/M-Cryst] Diarrhea    Novolog Mix 70-30 [Insulin Asp Prt-Insulin Aspart] Nausea and Vomiting     Past Medical History:   Diagnosis Date    Cardiac echocardiogram 09/19/2016    [facility]:  Tech difficult. Pt refused 2nd attempt at bubble study. EF 60%. No WMA. Mild conc LVH. RVSP 20 mmHg. No atrial shunting by Doppler.  Cardiovascular lower extremity venous duplex 06/20/2016    No DVT bilaterally.  Carotid duplex 09/19/2016    Mild < 50% bilateral ICA stenosis.  Cerebral artery occlusion with cerebral infarction ([facility])     Chronic anticoagulation     Xarelto    CKD (chronic kidney disease) stage 3, GFR 30-59 ml/min ([facility])     Diabetes ([address]     Diabetic gastroparesis associated with type 2 diabetes mellitus ([address] 2/24/2012    Diabetic neuropathy ([address]     Diabetic retinopathy     Hypercholesterolemia     Hypertension     Hypovitaminosis D 4/26/2012    Currently on vitamin d therapy     PAF (paroxysmal atrial fibrillation) ([address]     Transient cerebral ischemia 4/8/2017    Type 2 diabetes mellitus with stage 4 chronic kidney disease, with long-term current use of insulin ([address] 8/14/2018     Past Surgical History:   Procedure Laterality Date    HX ORTHOPAEDIC  January 2013    right toe nail surgery Dr. Travis Shah     Family History   Problem Relation Age of Onset    Diabetes Mother     Cancer Paternal Grandmother      Social History     Tobacco Use    Smoking status: Never Smoker    Smokeless tobacco: Never Used   Substance Use Topics    Alcohol use: No       ROS    Objective:   No flowsheet data found.    General: alert, cooperative, no distress   Mental  status: normal mood, behavior, speech, dress, motor activity, and thought processes, able to follow commands   HENT: NCAT   Neck: no visualized mass   Resp: no respiratory distress   Neuro: no gross deficits   Skin: no discoloration or lesions of concern on visible areas   Psychiatric: normal affect, consistent with stated mood, no evidence of hallucinations     Additional exam findings: We discussed the expected course, resolution and complications of the diagnosis(es) in detail. Medication risks, benefits, costs, interactions, and alternatives were discussed as indicated. I advised her to contact the office if her condition worsens, changes or fails to improve as anticipated. She expressed understanding with the diagnosis(es) and plan. Grace Whitehead, who was evaluated through a patient-initiated, synchronous (real-time) audio-video encounter, and/or her healthcare decision maker, is aware that it is a billable service, with coverage as determined by her insurance carrier. She provided verbal consent to proceed: Yes, and patient identification was verified. It was conducted pursuant to the emergency declaration under the [address] [place] [address], [address] [place] [address] authority and the [employer] General Act. A caregiver was present when appropriate. Ability to conduct physical exam was limited. I was in the office. The patient was at home.       Kofi Mandel NP

## 2020-08-16 NOTE — ADDENDUM NOTE
Addended by: Rony [facility] on: 8/16/2020 12:53 AM     Modules accepted: Level of Service

## 2020-08-18 ENCOUNTER — HOSPITAL ENCOUNTER (OUTPATIENT)
Dept: LAB | Age: 35
Discharge: HOME | End: 2020-08-18
Payer: MEDICARE

## 2020-08-18 LAB
25(OH)D3 SERPL-MCNC: 11.9 NG/ML (ref 30–100)
ALBUMIN SERPL-MCNC: 3.3 G/DL (ref 3.4–5)
ALBUMIN SERPL-MCNC: 3.3 G/DL (ref 3.4–5)
ALBUMIN/GLOB SERPL: 0.8 {RATIO} (ref 0.8–1.7)
ALP SERPL-CCNC: 123 U/L (ref 45–117)
ALT SERPL-CCNC: 17 U/L (ref 13–56)
ANION GAP SERPL CALC-SCNC: 3 MMOL/L (ref 3–18)
ANION GAP SERPL CALC-SCNC: 5 MMOL/L (ref 3–18)
AST SERPL-CCNC: 16 U/L (ref 10–38)
BILIRUB SERPL-MCNC: 0.3 MG/DL (ref 0.2–1)
BUN SERPL-MCNC: 50 MG/DL (ref 7–18)
BUN SERPL-MCNC: 51 MG/DL (ref 7–18)
BUN/CREAT SERPL: 13 (ref 12–20)
BUN/CREAT SERPL: 13 (ref 12–20)
CALCIUM SERPL-MCNC: 8.7 MG/DL (ref 8.5–10.1)
CALCIUM SERPL-MCNC: 8.7 MG/DL (ref 8.5–10.1)
CALCIUM SERPL-MCNC: 8.8 MG/DL (ref 8.5–10.1)
CHLORIDE SERPL-SCNC: 115 MMOL/L (ref 100–111)
CHLORIDE SERPL-SCNC: 116 MMOL/L (ref 100–111)
CHOLEST SERPL-MCNC: 164 MG/DL
CO2 SERPL-SCNC: 23 MMOL/L (ref 21–32)
CO2 SERPL-SCNC: 23 MMOL/L (ref 21–32)
CREAT SERPL-MCNC: 3.85 MG/DL (ref 0.6–1.3)
CREAT SERPL-MCNC: 3.94 MG/DL (ref 0.6–1.3)
CREAT UR-MCNC: 75 MG/DL (ref 30–125)
ERYTHROCYTE [DISTWIDTH] IN BLOOD BY AUTOMATED COUNT: 13 % (ref 11.6–14.5)
EST. AVERAGE GLUCOSE BLD GHB EST-MCNC: 255 MG/DL
GLOBULIN SER CALC-MCNC: 4 G/DL (ref 2–4)
GLUCOSE SERPL-MCNC: 119 MG/DL (ref 74–99)
GLUCOSE SERPL-MCNC: 122 MG/DL (ref 74–99)
HBA1C MFR BLD: 10.5 % (ref 4.2–5.6)
HCT VFR BLD AUTO: 29.1 % (ref 35–45)
HDLC SERPL-MCNC: 55 MG/DL (ref 40–60)
HDLC SERPL: 3 {RATIO} (ref 0–5)
HGB BLD-MCNC: 9 G/DL (ref 12–16)
LDLC SERPL CALC-MCNC: 93.4 MG/DL (ref 0–100)
LIPID PROFILE,FLP: NORMAL
MCH RBC QN AUTO: 26.9 PG (ref 24–34)
MCHC RBC AUTO-ENTMCNC: 30.9 G/DL (ref 31–37)
MCV RBC AUTO: 87.1 FL (ref 74–97)
PHOSPHATE SERPL-MCNC: 3.5 MG/DL (ref 2.5–4.9)
PLATELET # BLD AUTO: 225 K/UL (ref 135–420)
PMV BLD AUTO: 12.3 FL (ref 9.2–11.8)
POTASSIUM SERPL-SCNC: 5.4 MMOL/L (ref 3.5–5.5)
POTASSIUM SERPL-SCNC: 5.4 MMOL/L (ref 3.5–5.5)
PROT SERPL-MCNC: 7.3 G/DL (ref 6.4–8.2)
PROT UR-MCNC: 237 MG/DL
PTH-INTACT SERPL-MCNC: 419.8 PG/ML (ref 18.4–88)
RBC # BLD AUTO: 3.34 M/UL (ref 4.2–5.3)
SODIUM SERPL-SCNC: 141 MMOL/L (ref 136–145)
SODIUM SERPL-SCNC: 144 MMOL/L (ref 136–145)
TRIGL SERPL-MCNC: 78 MG/DL (ref ?–150)
VLDLC SERPL CALC-MCNC: 15.6 MG/DL
WBC # BLD AUTO: 5.5 K/UL (ref 4.6–13.2)

## 2020-08-18 PROCEDURE — 36415 COLL VENOUS BLD VENIPUNCTURE: CPT

## 2020-08-18 PROCEDURE — 80069 RENAL FUNCTION PANEL: CPT

## 2020-08-18 PROCEDURE — 83970 ASSAY OF PARATHORMONE: CPT

## 2020-08-18 PROCEDURE — 80053 COMPREHEN METABOLIC PANEL: CPT

## 2020-08-18 PROCEDURE — 80061 LIPID PANEL: CPT

## 2020-08-18 PROCEDURE — 82306 VITAMIN D 25 HYDROXY: CPT

## 2020-08-18 PROCEDURE — 85027 COMPLETE CBC AUTOMATED: CPT

## 2020-08-18 PROCEDURE — 82570 ASSAY OF URINE CREATININE: CPT

## 2020-08-18 PROCEDURE — 84156 ASSAY OF PROTEIN URINE: CPT

## 2020-08-18 PROCEDURE — 83036 HEMOGLOBIN GLYCOSYLATED A1C: CPT

## 2020-09-21 ENCOUNTER — PATIENT MESSAGE (OUTPATIENT)
Dept: FAMILY MEDICINE CLINIC | Age: 35
End: 2020-09-21

## 2020-09-21 DIAGNOSIS — E78.00 PURE HYPERCHOLESTEROLEMIA: ICD-10-CM

## 2020-09-21 RX ORDER — SIMVASTATIN 40 MG/1
40 TABLET, FILM COATED ORAL
Qty: 90 TAB | Refills: 1 | Status: SHIPPED | OUTPATIENT
Start: 2020-09-21 | End: 2020-12-01

## 2020-09-21 NOTE — TELEPHONE ENCOUNTER
Last Visit: 8/6/20 with NP Christen Langford  Next Appointment: Advised to follow-up in 3 months  Previous Refill Encounter(s): 9/16/19 #30 with 6 refills    Requested Prescriptions     Pending Prescriptions Disp Refills    simvastatin (ZOCOR) 40 mg tablet 90 Tab 1     Sig: Take 1 Tab by mouth nightly.

## 2020-09-21 NOTE — TELEPHONE ENCOUNTER
From: Aashish Barry  To: Andrea Blackwood NP  Sent: 9/21/2020 1:24 AM EDT  Subject: Prescription Question    Kirill Milian has been faxing over a refill prescription for the simvistatin for the last 3 weeks waiting on approval to be filled.      Thank you,

## 2020-10-13 RX ORDER — OXYBUTYNIN CHLORIDE 5 MG/1
5 TABLET ORAL DAILY
Qty: 30 TAB | Refills: 2 | Status: SHIPPED | OUTPATIENT
Start: 2020-10-13 | End: 2021-04-27 | Stop reason: SDUPTHER

## 2020-10-13 NOTE — TELEPHONE ENCOUNTER
Last Visit: 8/6/20 with KEHINDE Vásquez  Next Appointment: Advised to follow-up in 3 months  Previous Refill Encounter(s): 7/13/20 #30 with 2 refills    Requested Prescriptions     Pending Prescriptions Disp Refills    oxybutynin (DITROPAN) 5 mg tablet 30 Tab 2     Sig: Take 1 Tab by mouth daily. none

## 2020-10-29 NOTE — TELEPHONE ENCOUNTER
Last Visit: 8/6/20 with NP Brandon Jarquin  Next Appointment: Advised to follow-up in 3 months  Previous Refill Encounter(s): 1/23/20 #60 with 2 refills    Requested Prescriptions     Pending Prescriptions Disp Refills    sodium bicarbonate 650 mg tablet 60 Tab 2     Sig: take 1 tablet by mouth 2 times a day

## 2020-11-03 RX ORDER — SODIUM BICARBONATE 650 MG/1
TABLET ORAL
Qty: 60 TAB | Refills: 2 | Status: SHIPPED | OUTPATIENT
Start: 2020-11-03 | End: 2021-08-06 | Stop reason: SDUPTHER

## 2020-11-28 ENCOUNTER — PATIENT MESSAGE (OUTPATIENT)
Dept: FAMILY MEDICINE CLINIC | Age: 35
End: 2020-11-28

## 2020-11-28 DIAGNOSIS — Z79.4 TYPE 2 DIABETES MELLITUS WITH COMPLICATION, WITH LONG-TERM CURRENT USE OF INSULIN (HCC): ICD-10-CM

## 2020-11-28 DIAGNOSIS — E11.8 TYPE 2 DIABETES MELLITUS WITH COMPLICATION, WITH LONG-TERM CURRENT USE OF INSULIN (HCC): ICD-10-CM

## 2020-11-30 NOTE — TELEPHONE ENCOUNTER
Norvasc is listed as historical. Please sign if appropriate. [facility] Lyrica [facility] for a 90 d/s. Last Visit: 8/6/20 with NP Nolan Bragg  Next Appointment: Advised to follow-up in 3 months  Previous Refill Encounter(s): 3/9/20 Valtrex #5 with 5 refills, 3/11/20 Buspar #60 with 2 refills, 9/12/19 Lyrica #90 with 3 refills    Requested Prescriptions     Pending Prescriptions Disp Refills    amLODIPine (Norvasc) 5 mg tablet 30 Tab 1     Sig: Take 1 Tab by mouth daily.  busPIRone (BUSPAR) 5 mg tablet 60 Tab 1     Sig: Take 1 Tab by mouth two (2) times a day.  pregabalin (LYRICA) 75 mg capsule 90 Cap 1     Sig: Take 1 Cap by mouth three (3) times daily.  valACYclovir (VALTREX) 1 gram tablet 5 Tab 1     Sig: Take 1 Tab by mouth daily for 5 days.        A new Rx for for Oxybutynin was sent to the pharmacy on 10/13/20 for #30 with 2 refills

## 2020-11-30 NOTE — TELEPHONE ENCOUNTER
From: Adalgisa Durbin  To: Vandana Juarez NP  Sent: 11/28/2020 9:36 PM EST  Subject: Update Medical Information    Hello Ms. Multani,  I am out of refills on Valacyclovir, buspirone, amlodipine, oxybutynin, and pregabalin. I tried to call in these refills with my pharmacy today.     Thank you    Real Cm    Thank you

## 2020-12-01 RX ORDER — VALACYCLOVIR HYDROCHLORIDE 1 G/1
1000 TABLET, FILM COATED ORAL DAILY
Qty: 5 TAB | Refills: 1 | Status: SHIPPED | OUTPATIENT
Start: 2020-12-01 | End: 2020-12-24 | Stop reason: SDUPTHER

## 2020-12-01 RX ORDER — PREGABALIN 75 MG/1
75 CAPSULE ORAL 3 TIMES DAILY
Qty: 90 CAP | Refills: 1 | Status: SHIPPED | OUTPATIENT
Start: 2020-12-01 | End: 2021-05-26 | Stop reason: SDUPTHER

## 2020-12-01 RX ORDER — AMLODIPINE BESYLATE 5 MG/1
5 TABLET ORAL DAILY
Qty: 30 TAB | Refills: 1 | Status: SHIPPED | OUTPATIENT
Start: 2020-12-01 | End: 2020-12-24 | Stop reason: SDUPTHER

## 2020-12-01 RX ORDER — BUSPIRONE HYDROCHLORIDE 5 MG/1
5 TABLET ORAL 2 TIMES DAILY
Qty: 60 TAB | Refills: 1 | Status: SHIPPED | OUTPATIENT
Start: 2020-12-01 | End: 2020-12-24 | Stop reason: SDUPTHER

## 2020-12-24 ENCOUNTER — VIRTUAL VISIT (OUTPATIENT)
Dept: FAMILY MEDICINE CLINIC | Age: 35
End: 2020-12-24
Payer: MEDICARE

## 2020-12-24 DIAGNOSIS — Z79.4 TYPE 2 DIABETES MELLITUS WITH COMPLICATION, WITH LONG-TERM CURRENT USE OF INSULIN (HCC): ICD-10-CM

## 2020-12-24 DIAGNOSIS — E11.8 TYPE 2 DIABETES MELLITUS WITH COMPLICATION, WITH LONG-TERM CURRENT USE OF INSULIN (HCC): ICD-10-CM

## 2020-12-24 PROCEDURE — 3046F HEMOGLOBIN A1C LEVEL >9.0%: CPT | Performed by: NURSE PRACTITIONER

## 2020-12-24 PROCEDURE — G8427 DOCREV CUR MEDS BY ELIG CLIN: HCPCS | Performed by: NURSE PRACTITIONER

## 2020-12-24 PROCEDURE — G8756 NO BP MEASURE DOC: HCPCS | Performed by: NURSE PRACTITIONER

## 2020-12-24 PROCEDURE — G8432 DEP SCR NOT DOC, RNG: HCPCS | Performed by: NURSE PRACTITIONER

## 2020-12-24 PROCEDURE — 99214 OFFICE O/P EST MOD 30 MIN: CPT | Performed by: NURSE PRACTITIONER

## 2020-12-24 PROCEDURE — 2022F DILAT RTA XM EVC RTNOPTHY: CPT | Performed by: NURSE PRACTITIONER

## 2020-12-24 RX ORDER — LANOLIN ALCOHOL/MO/W.PET/CERES
400 CREAM (GRAM) TOPICAL DAILY
COMMUNITY
Start: 2020-10-13 | End: 2021-05-26 | Stop reason: ALTCHOICE

## 2020-12-24 RX ORDER — CHOLECALCIFEROL TAB 125 MCG (5000 UNIT) 125 MCG
5000 TAB ORAL DAILY
COMMUNITY
End: 2021-12-24

## 2020-12-24 RX ORDER — ROSUVASTATIN CALCIUM 40 MG/1
40 TABLET, COATED ORAL
COMMUNITY
Start: 2020-10-13 | End: 2021-05-26 | Stop reason: SDUPTHER

## 2020-12-24 RX ORDER — VALACYCLOVIR HYDROCHLORIDE 1 G/1
TABLET, FILM COATED ORAL
Qty: 30 TAB | Refills: 2 | Status: SHIPPED | OUTPATIENT
Start: 2020-12-24 | End: 2022-09-23 | Stop reason: SDUPTHER

## 2020-12-24 RX ORDER — AMLODIPINE BESYLATE 5 MG/1
5 TABLET ORAL DAILY
Qty: 30 TAB | Refills: 5 | Status: SHIPPED | OUTPATIENT
Start: 2020-12-24 | End: 2021-05-26 | Stop reason: SDUPTHER

## 2020-12-24 RX ORDER — BUSPIRONE HYDROCHLORIDE 5 MG/1
5 TABLET ORAL 2 TIMES DAILY
Qty: 60 TAB | Refills: 5 | Status: SHIPPED | OUTPATIENT
Start: 2020-12-24 | End: 2021-06-02 | Stop reason: SDUPTHER

## 2020-12-24 NOTE — PROGRESS NOTES
Mukesh Covington is a 38 y.o. female who was seen by synchronous (real-time) audio-video technology on 12/24/2020 for Hypertension and Cholesterol Problem    Assessment & Plan:   Diagnoses and all orders for this visit:    1. Type 2 diabetes mellitus with complication, with long-term current use of insulin (HCC)  -     HEMOGLOBIN A1C WITH EAG; Future  -     LIPID PANEL; Future  -     METABOLIC PANEL, COMPREHENSIVE; Future    Other orders  -     valACYclovir (VALTREX) 1 gram tablet; Take 1 tab daily x5 days for outbreak  -     amLODIPine (Norvasc) 5 mg tablet; Take 1 Tab by mouth daily. -     busPIRone (BUSPAR) 5 mg tablet; Take 1 Tab by mouth two (2) times a day. Advised to schedule an appt with her dentist for routine care given she has not been seen in a approx a year. Follow-up and Dispositions    · Return in about 2 months (around 2/24/2021) for DM/HTN/HLD, fasting labs 1 wk prior, in office follow up. Routing History        I spent at least 20 minutes on this visit with this established patient. [id number]  Subjective:   HSV: states she had an outbreak 2 weeks ago right before her menstrual cycle. Comments she also developed a cold sore on lip last week. States she needs a refill on her valacyclovir. Patient states she noticed her gums were bleeding around 6pm.  States she noticed a slimy substance in her mouth and when she spit the mucus out it was blood. Comments later on that night she urinated and noticed the same slimy blood with wiping this occurred Sunday/Monday. Has not had any further bleeding since. Diabetic Review of Systems - medication compliance: compliant all of the time, diet compliance: noncompliant some of the time, home glucose monitoring: is performed regularly, further ROS: no polyuria or polydipsia, no chest pain, dyspnea or TIA's. Prior to Admission medications    Medication Sig Start Date End Date Taking?  Authorizing Provider   amLODIPine (Norvasc) 5 mg tablet Take 1 Tab by mouth daily. 12/1/20   Giulia KENNY NP   busPIRone (BUSPAR) 5 mg tablet Take 1 Tab by mouth two (2) times a day. 12/1/20   Giulia KENNY NP   pregabalin (LYRICA) 75 mg capsule Take 1 Cap by mouth three (3) times daily. 12/1/20   Giulia KENNY NP   sodium bicarbonate 650 mg tablet take 1 tablet by mouth 2 times a day 11/3/20   Giulia KENNY NP   oxybutynin (DITROPAN) 5 mg tablet Take 1 Tab by mouth daily. 10/13/20   Sara Mcmahon NP   insulin syringe-needle U-100 1 mL 32 gauge x 5/16\" syrg 1 Syringe by Does Not Apply route as needed (to use with humlin). 8/16/20   Giulia KENNY NP   metroNIDAZOLE (METROGEL) 0.75 % topical gel Apply  to affected area two (2) times a day. 8/6/20   Giulia KENNY NP   irbesartan (AVAPRO) 150 mg tablet TAKE 1 TABLET BY MOUTH DAILY FOR BLOOD PRESSURE 7/24/20   Mitchell Mcardle, MD   FUROSEMIDE PO Take  by mouth. Provider, Historical   rivaroxaban (Xarelto) 15 mg tab tablet Take 1 Tab by mouth daily. 6/4/20   Padma Huber K, DO   metoprolol tartrate (LOPRESSOR) 50 mg tablet TAKE 1 TABLET BY MOUTH TWICE DAILY 6/1/20   Arsalan Soto NP   insulin pump (PATIENT SUPPLIED) misc by SubCUTAneous route as needed. Provider, Historical   insulin lispro (HUMALOG) 100 unit/mL injection Given via pump. Pump set every 90 days in provider's office. Provider, Historical   Blood-Glucose Meter monitoring kit by Does Not Apply route two (2) times a day. 7/9/10   Geovany Foreman MD   glucose blood VI test strips (ONE TOUCH ULTRA TEST) strip by Does Not Apply route.  One touch ultra mini test strips 7/7/10   Alonzo Hameed MD     Patient Active Problem List   Diagnosis Code    Neuropathy G62.9    Diabetes mellitus ([facility] E11.9    Eye problems H57.9    Toe pain M79.676    Hypercholesteremia E78.00    Diabetic gastroparesis associated with type 2 diabetes mellitus ([facility] E11.43, K31.84    Hypovitaminosis D E55.9    Paroxysmal atrial fibrillation ([facility] I48.0    Chronic anticoagulation Z79.01    Transient cerebral ischemia G45.9    Renal insufficiency N28.9    Bad odor of urine R82.90    Type 2 diabetes mellitus with stage 4 chronic kidney disease, with long-term current use of insulin ([facility]) E11.22, N18.4, Z79.4    Encephalopathy acute G93.40    CKD (chronic kidney disease) stage 3, GFR 30-59 ml/min N18.30    Essential hypertension I10    Chronic renal impairment, stage 4 (severe) ([facility]) N18.4    Acute hyperkalemia E87.5    Acute blood loss anemia D62    PAF (paroxysmal atrial fibrillation) ([facility]) I48.0    Abnormal vaginal bleeding N93.9    Acute kidney injury superimposed on chronic kidney disease ([facility]) N17.9, N18.9     Patient Active Problem List    Diagnosis Date Noted    Acute hyperkalemia 05/15/2020    Acute blood loss anemia 05/15/2020    PAF (paroxysmal atrial fibrillation) ([facility]) 05/15/2020    Abnormal vaginal bleeding 05/15/2020    Acute kidney injury superimposed on chronic kidney disease ([facility] 05/15/2020    CKD (chronic kidney disease) stage 3, GFR 30-59 ml/min     Essential hypertension 12/11/2019    Chronic renal impairment, stage 4 (severe) ([facility] 12/11/2019    Encephalopathy acute 01/03/2019    Type 2 diabetes mellitus with stage 4 chronic kidney disease, with long-term current use of insulin ([facility] 08/14/2018    Bad odor of urine 10/26/2017    Paroxysmal atrial fibrillation ([facility]) 04/08/2017    Chronic anticoagulation 04/08/2017    Transient cerebral ischemia 04/08/2017    Renal insufficiency 04/08/2017    Hypovitaminosis D 04/26/2012    Diabetic gastroparesis associated with type 2 diabetes mellitus ([facility] 02/24/2012    Hypercholesteremia 06/21/2010    Neuropathy 05/20/2010    Diabetes mellitus ([facility] 05/20/2010    Eye problems 05/20/2010    Toe pain 05/20/2010     Current Outpatient Medications   Medication Sig Dispense Refill    rosuvastatin (CRESTOR) 40 mg tablet Take 40 mg by mouth nightly.       magnesium oxide (MAG-OX) 400 mg tablet Take 400 mg by mouth daily.  amLODIPine (Norvasc) 5 mg tablet Take 1 Tab by mouth daily. 30 Tab 1    busPIRone (BUSPAR) 5 mg tablet Take 1 Tab by mouth two (2) times a day. 60 Tab 1    pregabalin (LYRICA) 75 mg capsule Take 1 Cap by mouth three (3) times daily. 90 Cap 1    sodium bicarbonate 650 mg tablet take 1 tablet by mouth 2 times a day 60 Tab 2    oxybutynin (DITROPAN) 5 mg tablet Take 1 Tab by mouth daily. 30 Tab 2    insulin syringe-needle U-100 1 mL 32 gauge x 5/16\" syrg 1 Syringe by Does Not Apply route as needed (to use with humlin). 100 Each 1    metroNIDAZOLE (METROGEL) 0.75 % topical gel Apply  to affected area two (2) times a day. 60 g 0    irbesartan (AVAPRO) 150 mg tablet TAKE 1 TABLET BY MOUTH DAILY FOR BLOOD PRESSURE 90 Tab 3    FUROSEMIDE PO Take 20 mg by mouth daily as needed (edema).  rivaroxaban (Xarelto) 15 mg tab tablet Take 1 Tab by mouth daily. 30 Tab 5    metoprolol tartrate (LOPRESSOR) 50 mg tablet TAKE 1 TABLET BY MOUTH TWICE DAILY 180 Tab 6    insulin pump (PATIENT SUPPLIED) misc by SubCUTAneous route as needed.  insulin lispro (HUMALOG) 100 unit/mL injection Given via pump. Pump set every 90 days in provider's office.  Blood-Glucose Meter monitoring kit by Does Not Apply route two (2) times a day. 1 Kit 0    glucose blood VI test strips (ONE TOUCH ULTRA TEST) strip by Does Not Apply route. One touch ultra mini test strips 1 Package 11     Allergies   Allergen Reactions    Latex Hives    Contrast Agent [Iodine] Other (comments)     \"Burning with shooting pain\"    Macrobid [Nitrofurantoin Monohyd/M-Cryst] Diarrhea    Novolog Mix 70-30 [Insulin Asp Prt-Insulin Aspart] Nausea and Vomiting     Past Medical History:   Diagnosis Date    Cardiac echocardiogram 09/19/2016    [facility]:  Tech difficult. Pt refused 2nd attempt at bubble study. EF 60%. No WMA. Mild conc LVH. RVSP 20 mmHg. No atrial shunting by Doppler.       Cardiovascular lower extremity venous duplex 06/20/2016    No DVT bilaterally.  Carotid duplex 09/19/2016    Mild < 50% bilateral ICA stenosis.  Cerebral artery occlusion with cerebral infarction (HCC)     Chronic anticoagulation     Xarelto    CKD (chronic kidney disease) stage 3, GFR 30-59 ml/min (HCC)     Diabetes ([facility]     Diabetic gastroparesis associated with type 2 diabetes mellitus ([facility] 2/24/2012    [facility]     Diabetic retinopathy     Hypercholesterolemia     Hypertension     Hypovitaminosis D 4/26/2012    Currently on vitamin d therapy     PAF (paroxysmal atrial fibrillation) ([facility]     Transient cerebral ischemia 4/8/2017    Type 2 diabetes mellitus with stage 4 chronic kidney disease, with long-term current use of insulin ([facility] 8/14/2018     Past Surgical History:   Procedure Laterality Date    HX ORTHOPAEDIC  January 2013    right toe nail surgery Dr. Viral Dubose     Family History   Problem Relation Age of Onset    Diabetes Mother     Cancer Paternal Grandmother      Social History     Tobacco Use    Smoking status: Never Smoker    Smokeless tobacco: Never Used   Substance Use Topics    Alcohol use: No       ROS    Objective:   No flowsheet data found. General: alert, cooperative, no distress   Mental  status: normal mood, behavior, speech, dress, motor activity, and thought processes, able to follow commands   HENT: NCAT   Neck: no visualized mass   Resp: no respiratory distress   Neuro: no gross deficits   Skin: no discoloration or lesions of concern on visible areas   Psychiatric: normal affect, consistent with stated mood, no evidence of hallucinations     Additional exam findings: We discussed the expected course, resolution and complications of the diagnosis(es) in detail. Medication risks, benefits, costs, interactions, and alternatives were discussed as indicated.   I advised her to contact the office if her condition worsens, changes or fails to improve as anticipated. She expressed understanding with the diagnosis(es) and plan. Ace Jenkins, who was evaluated through a patient-initiated, synchronous (real-time) audio-video encounter, and/or her healthcare decision maker, is aware that it is a billable service, with coverage as determined by her insurance carrier. She provided verbal consent to proceed: Yes, and patient identification was verified. It was conducted pursuant to the emergency declaration under the [address] [place] [address] authority and the [employer] Act. A caregiver was present when appropriate. Ability to conduct physical exam was limited. I was in the office. The patient was at home.       Fletcher Griffiths NP

## 2021-03-09 ENCOUNTER — OFFICE VISIT (OUTPATIENT)
Dept: FAMILY MEDICINE CLINIC | Age: 36
End: 2021-03-09
Payer: MEDICARE

## 2021-03-09 ENCOUNTER — HOSPITAL ENCOUNTER (OUTPATIENT)
Dept: GENERAL RADIOLOGY | Age: 36
Discharge: HOME | End: 2021-03-09
Payer: MEDICARE

## 2021-03-09 ENCOUNTER — APPOINTMENT (OUTPATIENT)
Dept: FAMILY MEDICINE CLINIC | Age: 36
End: 2021-03-09

## 2021-03-09 VITALS
BODY MASS INDEX: 33.13 KG/M2 | DIASTOLIC BLOOD PRESSURE: 77 MMHG | RESPIRATION RATE: 18 BRPM | WEIGHT: 180 LBS | TEMPERATURE: 97.2 F | HEIGHT: 62 IN | HEART RATE: 100 BPM | SYSTOLIC BLOOD PRESSURE: 111 MMHG | OXYGEN SATURATION: 98 %

## 2021-03-09 DIAGNOSIS — E11.8 TYPE 2 DIABETES MELLITUS WITH COMPLICATION, WITH LONG-TERM CURRENT USE OF INSULIN (HCC): ICD-10-CM

## 2021-03-09 DIAGNOSIS — Z79.4 TYPE 2 DIABETES MELLITUS WITH COMPLICATION, WITH LONG-TERM CURRENT USE OF INSULIN (HCC): ICD-10-CM

## 2021-03-09 DIAGNOSIS — M79.674 PAIN AND SWELLING OF TOE OF RIGHT FOOT: ICD-10-CM

## 2021-03-09 DIAGNOSIS — M79.89 PAIN AND SWELLING OF TOE OF RIGHT FOOT: Primary | ICD-10-CM

## 2021-03-09 DIAGNOSIS — M79.89 PAIN AND SWELLING OF TOE OF RIGHT FOOT: ICD-10-CM

## 2021-03-09 DIAGNOSIS — M79.674 PAIN AND SWELLING OF TOE OF RIGHT FOOT: Primary | ICD-10-CM

## 2021-03-09 DIAGNOSIS — Z11.59 NEED FOR HEPATITIS C SCREENING TEST: ICD-10-CM

## 2021-03-09 DIAGNOSIS — Z00.00 MEDICARE ANNUAL WELLNESS VISIT, SUBSEQUENT: ICD-10-CM

## 2021-03-09 PROCEDURE — 73630 X-RAY EXAM OF FOOT: CPT

## 2021-03-09 PROCEDURE — G0439 PPPS, SUBSEQ VISIT: HCPCS | Performed by: NURSE PRACTITIONER

## 2021-03-09 PROCEDURE — 99213 OFFICE O/P EST LOW 20 MIN: CPT | Performed by: NURSE PRACTITIONER

## 2021-03-09 PROCEDURE — 3046F HEMOGLOBIN A1C LEVEL >9.0%: CPT | Performed by: NURSE PRACTITIONER

## 2021-03-09 PROCEDURE — 2022F DILAT RTA XM EVC RTNOPTHY: CPT | Performed by: NURSE PRACTITIONER

## 2021-03-09 PROCEDURE — G8752 SYS BP LESS 140: HCPCS | Performed by: NURSE PRACTITIONER

## 2021-03-09 PROCEDURE — G8432 DEP SCR NOT DOC, RNG: HCPCS | Performed by: NURSE PRACTITIONER

## 2021-03-09 PROCEDURE — G8417 CALC BMI ABV UP PARAM F/U: HCPCS | Performed by: NURSE PRACTITIONER

## 2021-03-09 PROCEDURE — G8754 DIAS BP LESS 90: HCPCS | Performed by: NURSE PRACTITIONER

## 2021-03-09 PROCEDURE — G8427 DOCREV CUR MEDS BY ELIG CLIN: HCPCS | Performed by: NURSE PRACTITIONER

## 2021-03-09 RX ORDER — BUTALBITAL, ACETAMINOPHEN AND CAFFEINE 50; 325; 40 MG/1; MG/1; MG/1
1 TABLET ORAL
Qty: 40 TAB | Refills: 0 | Status: SHIPPED | OUTPATIENT
Start: 2021-03-09 | End: 2021-04-05 | Stop reason: SDUPTHER

## 2021-03-09 RX ORDER — ONDANSETRON 4 MG/1
4 TABLET, ORALLY DISINTEGRATING ORAL
Qty: 20 TAB | Refills: 0 | Status: SHIPPED | OUTPATIENT
Start: 2021-03-09 | End: 2021-12-24

## 2021-03-09 NOTE — PROGRESS NOTES
Dwight Gonsalez is a 38 y.o. female who was seen in clinic today (3/9/2021) for Toe Pain and Annual Wellness Visit    Assessment & Plan:   Diagnoses and all orders for this visit:    1. Pain and swelling of toe of right foot  -     XR FOOT RT MIN 3 V; Future  -     REFERRAL TO ORTHOPEDICS    2. Medicare annual wellness visit, subsequent    3. Need for hepatitis C screening test  -     HEPATITIS C AB; Future    4. Type 2 diabetes mellitus with complication, with long-term current use of insulin (HCC)  -     MICROALBUMIN, UR, RAND W/ MICROALB/CREAT RATIO; Future    Other orders  -     butalbital-acetaminophen-caffeine (FIORICET, ESGIC) -40 mg per tablet; Take 1 Tab by mouth every six (6) hours as needed for Headache.  -     ondansetron (ZOFRAN ODT) 4 mg disintegrating tablet; Take 1 Tab by mouth every eight (8) hours as needed for Nausea or Vomiting. I have discussed the diagnosis with the patient and the intended plan as seen in the above orders. The patient has received an after-visit summary and questions were answered concerning future plans. I have discussed medication side effects and warnings with the patient as well. Patient agreeable with above plan and verbalizes understanding. Subjective:   Patient states 2 weeks ago she walking up her stairs and hit the toes on her right foot. States pain has improved but continues to have swelling to 2nd/3rd toes on right. Comments last night she did notice some burning to area. Patient further expresses concerns migraines prior to menstrual cycle. Patient advised to speak with GYN regarding possible hormonal options to help with menstrual migraines.     Lab Results   Component Value Date/Time    Sodium 141 08/18/2020 02:59 PM    Sodium 144 08/18/2020 02:59 PM    Potassium 5.4 08/18/2020 02:59 PM    Potassium 5.4 08/18/2020 02:59 PM    Chloride 115 (H) 08/18/2020 02:59 PM    Chloride 116 (H) 08/18/2020 02:59 PM    CO2 23 08/18/2020 02:59 PM    CO2 23 08/18/2020 02:59 PM    Anion gap 3 08/18/2020 02:59 PM    Anion gap 5 08/18/2020 02:59 PM    Glucose 119 (H) 08/18/2020 02:59 PM    Glucose 122 (H) 08/18/2020 02:59 PM    BUN 50 (H) 08/18/2020 02:59 PM    BUN 51 (H) 08/18/2020 02:59 PM    Creatinine 3.94 (H) 08/18/2020 02:59 PM    Creatinine 3.85 (H) 08/18/2020 02:59 PM    BUN/Creatinine ratio 13 08/18/2020 02:59 PM    BUN/Creatinine ratio 13 08/18/2020 02:59 PM    GFR est AA 16 (L) 08/18/2020 02:59 PM    GFR est AA 16 (L) 08/18/2020 02:59 PM    GFR est non-AA 13 (L) 08/18/2020 02:59 PM    GFR est non-AA 13 (L) 08/18/2020 02:59 PM    Calcium 8.7 08/18/2020 03:01 PM    Bilirubin, total 0.3 08/18/2020 02:59 PM    Alk.  phosphatase 123 (H) 08/18/2020 02:59 PM    Protein, total 7.3 08/18/2020 02:59 PM    Albumin 3.3 (L) 08/18/2020 02:59 PM    Albumin 3.3 (L) 08/18/2020 02:59 PM    Globulin 4.0 08/18/2020 02:59 PM    A-G Ratio 0.8 08/18/2020 02:59 PM    ALT (SGPT) 17 08/18/2020 02:59 PM    AST (SGOT) 16 08/18/2020 02:59 PM     Lab Results   Component Value Date/Time    Cholesterol, total 164 08/18/2020 02:59 PM    Cholesterol (POC) 267 01/25/2013 12:05 PM    HDL Cholesterol 55 08/18/2020 02:59 PM    HDL Cholesterol (POC) 37 01/25/2013 12:05 PM    LDL Cholesterol (POC) 185 01/25/2013 12:05 PM    LDL, calculated 93.4 08/18/2020 02:59 PM    VLDL, calculated 15.6 08/18/2020 02:59 PM    Triglyceride 78 08/18/2020 02:59 PM    Triglycerides (POC) 225 01/25/2013 12:05 PM    CHOL/HDL Ratio 3.0 08/18/2020 02:59 PM     Lab Results   Component Value Date/Time    Hemoglobin A1c 10.5 (H) 08/18/2020 02:58 PM    Hemoglobin A1c (POC) 8.5 05/04/2018 10:25 AM    Hemoglobin A1c, External 9.5 02/06/2020     Lab Results   Component Value Date/Time    Vitamin D 25-Hydroxy 11.9 (L) 08/18/2020 03:02 PM       Lab Results   Component Value Date/Time    WBC 5.5 08/18/2020 02:59 PM    HGB 9.0 (L) 08/18/2020 02:59 PM    HCT 29.1 (L) 08/18/2020 02:59 PM    PLATELET 048 58/41/2413 02:59 PM    MCV 87.1 08/18/2020 02:59 PM       Wt Readings from Last 3 Encounters:   03/09/21 180 lb (81.6 kg)   06/04/20 191 lb (86.6 kg)   05/17/20 189 lb 9.5 oz (86 kg)     Temp Readings from Last 3 Encounters:   03/09/21 97.2 °F (36.2 °C) (Temporal)   05/17/20 98.6 °F (37 °C)   01/23/20 98.3 °F (36.8 °C) (Oral)     BP Readings from Last 3 Encounters:   03/09/21 111/77   06/04/20 130/84   05/17/20 127/88     Pulse Readings from Last 3 Encounters:   03/09/21 100   06/04/20 90   05/17/20 98       Prior to Admission medications    Medication Sig Start Date End Date Taking? Authorizing Provider   Xarelto 15 mg tab tablet TAKE 1 TABLET BY MOUTH DAILY 1/4/21  Yes Yadira Wilburn NP   rosuvastatin (CRESTOR) 40 mg tablet Take 40 mg by mouth nightly. 10/13/20  Yes Provider, Historical   magnesium oxide (MAG-OX) 400 mg tablet Take 400 mg by mouth daily. 10/13/20  Yes Provider, Historical   valACYclovir (VALTREX) 1 gram tablet Take 1 tab daily x5 days for outbreak 12/24/20  Yes Aura Fernández NP   amLODIPine (Norvasc) 5 mg tablet Take 1 Tab by mouth daily. 12/24/20  Yes Aura Fernández NP   busPIRone (BUSPAR) 5 mg tablet Take 1 Tab by mouth two (2) times a day. 12/24/20  Yes Aura Fernández NP   cholecalciferol (Vitamin D3) (5000 Units/125 mcg) tab tablet Take 5,000 Units by mouth daily. Yes Provider, Historical   pregabalin (LYRICA) 75 mg capsule Take 1 Cap by mouth three (3) times daily. 12/1/20  Yes Aura Fernández NP   sodium bicarbonate 650 mg tablet take 1 tablet by mouth 2 times a day 11/3/20  Yes Aura Fernández NP   oxybutynin (DITROPAN) 5 mg tablet Take 1 Tab by mouth daily. 10/13/20  Yes Aura Fernández NP   insulin syringe-needle U-100 1 mL 32 gauge x 5/16\" syrg 1 Syringe by Does Not Apply route as needed (to use with humlin). 8/16/20  Yes Aura Fernández NP   metroNIDAZOLE (METROGEL) 0.75 % topical gel Apply  to affected area two (2) times a day.  8/6/20  Yes Aura Fernández NP   irbesartan (AVAPRO) 150 mg tablet TAKE 1 TABLET BY MOUTH DAILY FOR BLOOD PRESSURE 7/24/20  Yes Sarai Dos Santos MD   FUROSEMIDE PO Take 20 mg by mouth daily as needed (edema). Yes Provider, Historical   metoprolol tartrate (LOPRESSOR) 50 mg tablet TAKE 1 TABLET BY MOUTH TWICE DAILY 6/1/20  Yes Raúl ANDRADE NP   insulin pump (PATIENT SUPPLIED) misc by SubCUTAneous route as needed. Yes Provider, Historical   insulin lispro (HUMALOG) 100 unit/mL injection Given via pump. Pump set every 90 days in provider's office. Yes Provider, Historical   Blood-Glucose Meter monitoring kit by Does Not Apply route two (2) times a day. 7/9/10  Yes Shabbir Macedo MD   glucose blood VI test strips (ONE TOUCH ULTRA TEST) strip by Does Not Apply route. One touch ultra mini test strips 7/7/10  Yes Shabbir Macedo MD         The following sections were reviewed & updated as appropriate: PMH, PSH, FH, and SH. Review of Systems   Constitutional: Negative for activity change, appetite change, chills, fatigue and fever. Respiratory: Negative for chest tightness and shortness of breath. Cardiovascular: Negative for chest pain. Neurological: Negative for dizziness and headaches. Objective:     Visit Vitals  /77 (BP 1 Location: Right arm, BP Patient Position: Sitting, BP Cuff Size: Adult)   Pulse 100   Temp 97.2 °F (36.2 °C) (Temporal)   Resp 18   Ht 5' 2\" (1.575 m)   Wt 180 lb (81.6 kg)   LMP 02/14/2021   SpO2 98%   BMI 32.92 kg/m²      Physical Exam  Constitutional:       General: She is not in acute distress. Appearance: She is well-developed. HENT:      Head: Normocephalic and atraumatic. Neck:      Musculoskeletal: Normal range of motion and neck supple. Vascular: No carotid bruit. Cardiovascular:      Rate and Rhythm: Normal rate and regular rhythm. Heart sounds: Normal heart sounds. No murmur. No friction rub. No gallop.     Pulmonary:      Effort: Pulmonary effort is normal.      Breath sounds: Normal breath sounds. No decreased breath sounds, wheezing, rhonchi or rales. Musculoskeletal:        Feet:    Lymphadenopathy:      Cervical: No cervical adenopathy. Skin:     General: Skin is warm and dry. Neurological:      Mental Status: She is alert and oriented to person, place, and time. Disclaimer: The patient understands our medical plan. Alternatives have been explained and offered. The risks, benefits and significant side effects of all medications have been reviewed. Anticipated time course and progression of condition reviewed. All questions have been addressed. She is encouraged to employ the information provided in the after visit summary, which was reviewed. Where applicable, she is instructed to call the clinic if she has not been notified either by phone or through [address] with the results of her tests or with an appointment plan for any referrals within 1 week(s). No news is not good news; it's no news. The patient  is to call if her condition worsens or fails to improve or if significant side effects are experienced. Aspects of this note may have been generated using voice recognition software. Despite editing, there may be unrecognized errors.        Blaze Mahajan NP

## 2021-03-09 NOTE — PATIENT INSTRUCTIONS
Medicare Wellness Visit, Female The best way to live healthy is to have a lifestyle where you eat a well-balanced diet, exercise regularly, limit alcohol use, and quit all forms of tobacco/nicotine, if applicable. Regular preventive services are another way to keep healthy. Preventive services (vaccines, screening tests, monitoring & exams) can help personalize your care plan, which helps you manage your own care. Screening tests can find health problems at the earliest stages, when they are easiest to treat. [facility] follows the current, evidence-based guidelines published by the [facility] ([facility]) when recommending preventive services for our patients. Because we follow these guidelines, sometimes recommendations change over time as research supports it. (For example, mammograms used to be recommended annually. Even though Medicare will still pay for an annual mammogram, the newer guidelines recommend a mammogram every two years for women of average risk). Of course, you and your doctor may decide to screen more often for some diseases, based on your risk and your co-morbidities (chronic disease you are already diagnosed with). Preventive services for you include: - Medicare offers their members a free annual wellness visit, which is time for you and your primary care provider to discuss and plan for your preventive service needs. Take advantage of this benefit every year! 
-All adults over the age of 72 should receive the recommended pneumonia vaccines. Current USPSTF guidelines recommend a series of two vaccines for the best pneumonia protection.  
-All adults should have a flu vaccine yearly and a tetanus vaccine every 10 years.  
-All adults age 48 and older should receive the shingles vaccines (series of two vaccines).      
-All adults age 38-68 who are overweight should have a diabetes screening test once every three years.  
-All adults born between [birth year] and 1967 should be screened once for Hepatitis C. 
-Other screening tests and preventive services for persons with diabetes include: an eye exam to screen for diabetic retinopathy, a kidney function test, a foot exam, and stricter control over your cholesterol.  
-Cardiovascular screening for adults with routine risk involves an electrocardiogram (ECG) at intervals determined by your doctor.  
-Colorectal cancer screenings should be done for adults age 54-65 with no increased risk factors for colorectal cancer. There are a number of acceptable methods of screening for this type of cancer. Each test has its own benefits and drawbacks. Discuss with your doctor what is most appropriate for you during your annual wellness visit. The different tests include: colonoscopy (considered the best screening method), a fecal occult blood test, a fecal DNA test, and sigmoidoscopy. 
 
-A bone mass density test is recommended when a woman turns 65 to screen for osteoporosis. This test is only recommended one time, as a screening. Some providers will use this same test as a disease monitoring tool if you already have osteoporosis. -Breast cancer screenings are recommended every other year for women of normal risk, age 54-69. 
-Cervical cancer screenings for women over age 72 are only recommended with certain risk factors. Here is a list of your current Health Maintenance items (your personalized list of preventive services) with a due date: 
Health Maintenance Due Topic Date Due  
 Hepatitis C Test  Never done  Pneumococcal Vaccine (1 of 3 - PCV13) Never done  COVID-19 Vaccine (1 of 2) Never done [facility]  01/10/2020  Albumin Urine Test  07/11/2020 [facility] Eye Exam  08/14/2020  Yearly Flu Vaccine (1) 09/01/2020  Pap Test  11/16/2020  Hemoglobin A1C    11/18/2020

## 2021-03-09 NOTE — PROGRESS NOTES
This is the Subsequent Medicare Annual Wellness Exam, performed 12 months or more after the Initial AWV or the last Subsequent AWV    I have reviewed the patient's medical history in detail and updated the computerized patient record. Depression Risk Factor Screening:     3 most recent PHQ Screens 3/9/2021   Little interest or pleasure in doing things Not at all   Feeling down, depressed, irritable, or hopeless Not at all   Total Score PHQ 2 0   Trouble falling or staying asleep, or sleeping too much -   Feeling tired or having little energy -   Poor appetite, weight loss, or overeating -   Feeling bad about yourself - or that you are a failure or have let yourself or your family down -   Trouble concentrating on things such as school, work, reading, or watching TV -   Moving or speaking so slowly that other people could have noticed; or the opposite being so fidgety that others notice -   Thoughts of being better off dead, or hurting yourself in some way -   PHQ 9 Score -   How difficult have these problems made it for you to do your work, take care of your home and get along with others -       Alcohol Risk Screen    Do you average more than 1 drink per night or more than 7 drinks a week:  No    On any one occasion in the past three months have you have had more than 3 drinks containing alcohol:  No        Functional Ability and Level of Safety:    Hearing: Hearing is good. Activities of Daily Living: The home contains: no safety equipment. Patient does total self care      Ambulation: with no difficulty     Fall Risk:  Fall Risk Assessment, last 12 mths 3/9/2021   Able to walk? Yes   Fall in past 12 months? 0   Do you feel unsteady?  0   Are you worried about falling 0      Abuse Screen:  Patient is not abused       Cognitive Screening    Has your family/caregiver stated any concerns about your memory: no     Assessment/Plan   Education and counseling provided:  Are appropriate based on today's review and evaluation    Diagnoses and all orders for this visit:    1. Pain and swelling of toe of right foot  -     XR FOOT RT MIN 3 V; Future  -     REFERRAL TO ORTHOPEDICS    2. Medicare annual wellness visit, subsequent    3. Need for hepatitis C screening test  -     HEPATITIS C AB; Future    4. Type 2 diabetes mellitus with complication, with long-term current use of insulin (HCC)  -     MICROALBUMIN, UR, RAND W/ MICROALB/CREAT RATIO; Future    Other orders  -     butalbital-acetaminophen-caffeine (FIORICET, ESGIC) -40 mg per tablet; Take 1 Tab by mouth every six (6) hours as needed for Headache.  -     ondansetron (ZOFRAN ODT) 4 mg disintegrating tablet; Take 1 Tab by mouth every eight (8) hours as needed for Nausea or Vomiting.       Health Maintenance Due     Health Maintenance Due   Topic Date Due    Hepatitis C Screening  Never done    Pneumococcal 0-64 years (1 of 3 - PCV13) Never done    COVID-19 Vaccine (1 of 2) Never done    Foot Exam Q1  01/10/2020    MICROALBUMIN Q1  07/11/2020    Eye Exam Retinal or Dilated  08/14/2020    Flu Vaccine (1) 09/01/2020    PAP AKA CERVICAL CYTOLOGY  11/16/2020    A1C test (Diabetic or Prediabetic)  11/18/2020     Patient Care Team   Patient Care Team:  Chon Zavala NP as PCP - General (Nurse Practitioner)  Chon Zavala NP as PCP - [facility] Empaneled Provider  Ariela Garrido MD (Family Medicine)  Manish You MD (Inactive) (Cardiology)  Ramos Drew MD as Physician (Urology)  Broderick Contreras,  (Pulmonary Disease)    History     Patient Active Problem List   Diagnosis Code    Neuropathy G62.9    Diabetes mellitus ([address] E11.9    Eye problems H57.9    Toe pain M79.676    Hypercholesteremia E78.00    Diabetic gastroparesis associated with type 2 diabetes mellitus ([address] E11.43, K31.84    Hypovitaminosis D E55.9    Paroxysmal atrial fibrillation ([address] I48.0    Chronic anticoagulation Z79.01    Transient cerebral ischemia G45.9    Renal insufficiency N28.9    Bad odor of urine R82.90    Type 2 diabetes mellitus with stage 4 chronic kidney disease, with long-term current use of insulin ([facility]) E11.22, N18.4, Z79.4    Encephalopathy acute G93.40    CKD (chronic kidney disease) stage 3, GFR 30-59 ml/min N18.30    Essential hypertension I10    Chronic renal impairment, stage 4 (severe) ([facility]) N18.4    Acute hyperkalemia E87.5    Acute blood loss anemia D62    PAF (paroxysmal atrial fibrillation) ([facility]) I48.0    Abnormal vaginal bleeding N93.9    Acute kidney injury superimposed on chronic kidney disease ([facility]) N17.9, N18.9     Past Medical History:   Diagnosis Date    Cardiac echocardiogram 09/19/2016    [facility]:  Tech difficult. Pt refused 2nd attempt at bubble study. EF 60%. No WMA. Mild conc LVH. RVSP 20 mmHg. No atrial shunting by Doppler.  Cardiovascular lower extremity venous duplex 06/20/2016    No DVT bilaterally.  Carotid duplex 09/19/2016    Mild < 50% bilateral ICA stenosis.     Cerebral artery occlusion with cerebral infarction ([facility])     Chronic anticoagulation     Xarelto    CKD (chronic kidney disease) stage 3, GFR 30-59 ml/min     Diabetes ([address]     Diabetic gastroparesis associated with type 2 diabetes mellitus ([address] 2/24/2012    Diabetic neuropathy ([address]     Diabetic retinopathy     Hypercholesterolemia     Hypertension     Hypovitaminosis D 4/26/2012    Currently on vitamin d therapy     PAF (paroxysmal atrial fibrillation) ([address]     Transient cerebral ischemia 4/8/2017    Type 2 diabetes mellitus with stage 4 chronic kidney disease, with long-term current use of insulin ([address] 8/14/2018      Past Surgical History:   Procedure Laterality Date    HX ORTHOPAEDIC  January 2013    right toe nail surgery Dr. Anthony Wilhelm     Current Outpatient Medications   Medication Sig Dispense Refill    Xarelto 15 mg tab tablet TAKE 1 TABLET BY MOUTH DAILY 30 Tab 6    rosuvastatin (CRESTOR) 40 mg tablet Take 40 mg by mouth nightly.  magnesium oxide (MAG-OX) 400 mg tablet Take 400 mg by mouth daily.  valACYclovir (VALTREX) 1 gram tablet Take 1 tab daily x5 days for outbreak 30 Tab 2    amLODIPine (Norvasc) 5 mg tablet Take 1 Tab by mouth daily. 30 Tab 5    busPIRone (BUSPAR) 5 mg tablet Take 1 Tab by mouth two (2) times a day. 60 Tab 5    cholecalciferol (Vitamin D3) (5000 Units/125 mcg) tab tablet Take 5,000 Units by mouth daily.  pregabalin (LYRICA) 75 mg capsule Take 1 Cap by mouth three (3) times daily. 90 Cap 1    sodium bicarbonate 650 mg tablet take 1 tablet by mouth 2 times a day 60 Tab 2    oxybutynin (DITROPAN) 5 mg tablet Take 1 Tab by mouth daily. 30 Tab 2    insulin syringe-needle U-100 1 mL 32 gauge x 5/16\" syrg 1 Syringe by Does Not Apply route as needed (to use with humlin). 100 Each 1    metroNIDAZOLE (METROGEL) 0.75 % topical gel Apply  to affected area two (2) times a day. 60 g 0    irbesartan (AVAPRO) 150 mg tablet TAKE 1 TABLET BY MOUTH DAILY FOR BLOOD PRESSURE 90 Tab 3    FUROSEMIDE PO Take 20 mg by mouth daily as needed (edema).  metoprolol tartrate (LOPRESSOR) 50 mg tablet TAKE 1 TABLET BY MOUTH TWICE DAILY 180 Tab 6    insulin pump (PATIENT SUPPLIED) misc by SubCUTAneous route as needed.  insulin lispro (HUMALOG) 100 unit/mL injection Given via pump. Pump set every 90 days in provider's office.  Blood-Glucose Meter monitoring kit by Does Not Apply route two (2) times a day. 1 Kit 0    glucose blood VI test strips (ONE TOUCH ULTRA TEST) strip by Does Not Apply route.  One touch ultra mini test strips 1 Package 11     Allergies   Allergen Reactions    Latex Hives    Contrast Agent [Iodine] Other (comments)     \"Burning with shooting pain\"    Macrobid [Nitrofurantoin Monohyd/M-Cryst] Diarrhea    Novolog Mix 70-30 [Insulin Asp Prt-Insulin Aspart] Nausea and Vomiting     Family History   Problem Relation Age of Onset    Diabetes Mother     Cancer Paternal Grandmother Social History     Tobacco Use    Smoking status: Never Smoker    Smokeless tobacco: Never Used   Substance Use Topics    Alcohol use:  No

## 2021-03-10 LAB
ALBUMIN SERPL-MCNC: 3.9 G/DL (ref 3.8–4.8)
ALBUMIN/GLOB SERPL: 1.3 {RATIO} (ref 1.2–2.2)
ALP SERPL-CCNC: 104 IU/L (ref 39–117)
ALT SERPL-CCNC: 11 IU/L (ref 0–32)
AST SERPL-CCNC: 17 IU/L (ref 0–40)
BILIRUB SERPL-MCNC: <0.2 MG/DL (ref 0–1.2)
BUN SERPL-MCNC: 38 MG/DL (ref 6–20)
BUN/CREAT SERPL: 10 (ref 9–23)
CALCIUM SERPL-MCNC: 8.9 MG/DL (ref 8.7–10.2)
CHLORIDE SERPL-SCNC: 102 MMOL/L (ref 96–106)
CHOLEST SERPL-MCNC: 158 MG/DL (ref 100–199)
CO2 SERPL-SCNC: 17 MMOL/L (ref 20–29)
CREAT SERPL-MCNC: 3.93 MG/DL (ref 0.57–1)
EST. AVERAGE GLUCOSE BLD GHB EST-MCNC: 309 MG/DL
GLOBULIN SER CALC-MCNC: 3 G/DL (ref 1.5–4.5)
GLUCOSE SERPL-MCNC: 179 MG/DL (ref 65–99)
HBA1C MFR BLD: 12.4 % (ref 4.8–5.6)
HCV AB S/CO SERPL IA: <0.1 S/CO RATIO (ref 0–0.9)
HDLC SERPL-MCNC: 43 MG/DL
LDLC SERPL CALC-MCNC: 73 MG/DL (ref 0–99)
POTASSIUM SERPL-SCNC: 4.7 MMOL/L (ref 3.5–5.2)
PROT SERPL-MCNC: 6.9 G/DL (ref 6–8.5)
SODIUM SERPL-SCNC: 137 MMOL/L (ref 134–144)
TRIGL SERPL-MCNC: 262 MG/DL (ref 0–149)
VLDLC SERPL CALC-MCNC: 42 MG/DL (ref 5–40)

## 2021-03-11 DIAGNOSIS — Z79.4 TYPE 2 DIABETES MELLITUS WITH COMPLICATION, WITH LONG-TERM CURRENT USE OF INSULIN (HCC): Primary | ICD-10-CM

## 2021-03-11 DIAGNOSIS — E11.8 TYPE 2 DIABETES MELLITUS WITH COMPLICATION, WITH LONG-TERM CURRENT USE OF INSULIN (HCC): Primary | ICD-10-CM

## 2021-03-15 ENCOUNTER — TELEPHONE (OUTPATIENT)
Dept: FAMILY MEDICINE CLINIC | Age: 36
End: 2021-03-15

## 2021-03-15 NOTE — TELEPHONE ENCOUNTER
----- Message from Junior Goncalves NP sent at 3/11/2021 10:16 PM EST -----  Please advise patient to schedule a follow up with her nephrologist.  Also her A1C has increased from previous results. I have placed a referral to endocrinology at [facility]. Thanks!

## 2021-03-17 ENCOUNTER — VIRTUAL VISIT (OUTPATIENT)
Dept: FAMILY MEDICINE CLINIC | Age: 36
End: 2021-03-17

## 2021-04-07 NOTE — TELEPHONE ENCOUNTER
Last Visit: 3/9/2021 with KEHINDE Coates  Next Appointment: none  Previous Refill Encounter(s): 3/9/2021 per NP Jaelyn Coates #40    Requested Prescriptions     Pending Prescriptions Disp Refills    butalbital-acetaminophen-caffeine (FIORICET, ESGIC) -40 mg per tablet 40 Tab 0     Sig: Take 1 Tab by mouth every six (6) hours as needed for Headache.

## 2021-04-09 RX ORDER — BUTALBITAL, ACETAMINOPHEN AND CAFFEINE 50; 325; 40 MG/1; MG/1; MG/1
1 TABLET ORAL
Qty: 40 TAB | Refills: 0 | Status: SHIPPED | OUTPATIENT
Start: 2021-04-09 | End: 2021-05-26 | Stop reason: SDUPTHER

## 2021-04-27 NOTE — TELEPHONE ENCOUNTER
Last Visit: 3/9/21 with KEHINDE Coates  Next Appointment: 5/12/21 with KEHINDE Coates  Previous Refill Encounter(s): 10/13/20 #30 with 2 refills    Requested Prescriptions     Pending Prescriptions Disp Refills    oxybutynin (DITROPAN) 5 mg tablet 90 Tab 1     Sig: Take 1 Tab by mouth daily.

## 2021-04-30 RX ORDER — OXYBUTYNIN CHLORIDE 5 MG/1
5 TABLET ORAL DAILY
Qty: 30 TAB | Refills: 2 | Status: CANCELLED | OUTPATIENT
Start: 2021-04-30

## 2021-05-03 RX ORDER — OXYBUTYNIN CHLORIDE 5 MG/1
5 TABLET ORAL DAILY
Qty: 90 TAB | Refills: 1 | Status: SHIPPED | OUTPATIENT
Start: 2021-05-03 | End: 2021-11-18

## 2021-05-12 ENCOUNTER — VIRTUAL VISIT (OUTPATIENT)
Dept: FAMILY MEDICINE CLINIC | Age: 36
End: 2021-05-12

## 2021-05-26 ENCOUNTER — VIRTUAL VISIT (OUTPATIENT)
Dept: FAMILY MEDICINE CLINIC | Age: 36
End: 2021-05-26
Payer: MEDICARE

## 2021-05-26 DIAGNOSIS — E11.8 TYPE 2 DIABETES MELLITUS WITH COMPLICATION, WITH LONG-TERM CURRENT USE OF INSULIN (HCC): ICD-10-CM

## 2021-05-26 DIAGNOSIS — I48.0 PAROXYSMAL ATRIAL FIBRILLATION (HCC): ICD-10-CM

## 2021-05-26 DIAGNOSIS — N18.4 CHRONIC RENAL IMPAIRMENT, STAGE 4 (SEVERE) (HCC): ICD-10-CM

## 2021-05-26 DIAGNOSIS — Z79.4 TYPE 2 DIABETES MELLITUS WITH COMPLICATION, WITH LONG-TERM CURRENT USE OF INSULIN (HCC): ICD-10-CM

## 2021-05-26 PROCEDURE — 2022F DILAT RTA XM EVC RTNOPTHY: CPT | Performed by: NURSE PRACTITIONER

## 2021-05-26 PROCEDURE — G8432 DEP SCR NOT DOC, RNG: HCPCS | Performed by: NURSE PRACTITIONER

## 2021-05-26 PROCEDURE — G8427 DOCREV CUR MEDS BY ELIG CLIN: HCPCS | Performed by: NURSE PRACTITIONER

## 2021-05-26 PROCEDURE — 99212 OFFICE O/P EST SF 10 MIN: CPT | Performed by: NURSE PRACTITIONER

## 2021-05-26 PROCEDURE — 3046F HEMOGLOBIN A1C LEVEL >9.0%: CPT | Performed by: NURSE PRACTITIONER

## 2021-05-26 PROCEDURE — G8756 NO BP MEASURE DOC: HCPCS | Performed by: NURSE PRACTITIONER

## 2021-05-26 RX ORDER — BUTALBITAL, ACETAMINOPHEN AND CAFFEINE 50; 325; 40 MG/1; MG/1; MG/1
1 TABLET ORAL
Qty: 40 TABLET | Refills: 2 | Status: SHIPPED | OUTPATIENT
Start: 2021-05-26 | End: 2022-06-03

## 2021-05-26 RX ORDER — METOCLOPRAMIDE 5 MG/1
TABLET ORAL
COMMUNITY
Start: 2021-03-26

## 2021-05-26 RX ORDER — AMLODIPINE BESYLATE 10 MG/1
10 TABLET ORAL DAILY
Qty: 90 TABLET | Refills: 1 | Status: SHIPPED | OUTPATIENT
Start: 2021-05-26 | End: 2022-02-02

## 2021-05-26 RX ORDER — PREGABALIN 75 MG/1
75 CAPSULE ORAL 3 TIMES DAILY
Qty: 90 CAPSULE | Refills: 3 | Status: SHIPPED | OUTPATIENT
Start: 2021-05-26 | End: 2022-02-11

## 2021-05-26 RX ORDER — ROSUVASTATIN CALCIUM 10 MG/1
10 TABLET, COATED ORAL
Qty: 90 TABLET | Refills: 1 | Status: SHIPPED | OUTPATIENT
Start: 2021-05-26 | End: 2022-04-13

## 2021-05-26 NOTE — PROGRESS NOTES
Lincoln Preciado is a 43 y.o. female who was seen by synchronous (real-time) audio-video technology on 5/26/2021 for No chief complaint on file. Assessment & Plan:   Diagnoses and all orders for this visit:    1. Type 2 diabetes mellitus with complication, with long-term current use of insulin ([facility])  -     pregabalin (LYRICA) 75 mg capsule; Take 1 Capsule by mouth three (3) times daily. 2. Chronic renal impairment, stage 4 (severe) ([facility])  Assessment & Plan:    Currently stable, instructed to schedule follow-up with nephrologist      3. Paroxysmal atrial fibrillation ([facility])  Assessment & Plan:   monitored by specialist. No acute findings meriting change in the plan      Other orders  -     amLODIPine (NORVASC) 10 mg tablet; Take 1 Tablet by mouth daily. -     butalbital-acetaminophen-caffeine (FIORICET, ESGIC) -40 mg per tablet; Take 1 Tablet by mouth every six (6) hours as needed for Headache.  -     rosuvastatin (CRESTOR) 10 mg tablet; Take 1 Tablet by mouth nightly. Begin taking crestor as above. I spent at least 20 minutes on this visit with this established patient. [id number]  Subjective:   Patient states she fioricet has been helpful with menstrual migraine. Comments she has to take medication 2 times daily prior to her menstrual cycle. Patient states she is taking simavastatin 40mg, reports she wasn't aware she was suppose to be taking Crestor. Prior to Admission medications    Medication Sig Start Date End Date Taking? Authorizing Provider   oxybutynin (DITROPAN) 5 mg tablet Take 1 Tab by mouth daily. 5/3/21   Carlo KENNY NP   butalbital-acetaminophen-caffeine (FIORICET, ESGIC) -40 mg per tablet Take 1 Tab by mouth every six (6) hours as needed for Headache. 4/9/21   Carlo KENNY NP   ondansetron (ZOFRAN ODT) 4 mg disintegrating tablet Take 1 Tab by mouth every eight (8) hours as needed for Nausea or Vomiting.  3/9/21   Carlo KENNY NP   Xarelto 15 mg tab tablet TAKE 1 TABLET BY MOUTH DAILY 1/4/21   Tania Soto NP   rosuvastatin (CRESTOR) 40 mg tablet Take 40 mg by mouth nightly. 10/13/20   Provider, Historical   magnesium oxide (MAG-OX) 400 mg tablet Take 400 mg by mouth daily. 10/13/20   Provider, Historical   valACYclovir (VALTREX) 1 gram tablet Take 1 tab daily x5 days for outbreak 12/24/20   Tamara KENNY NP   amLODIPine (Norvasc) 5 mg tablet Take 1 Tab by mouth daily. 12/24/20   Tamara KENNY NP   busPIRone (BUSPAR) 5 mg tablet Take 1 Tab by mouth two (2) times a day. 12/24/20   Tamara KENNY NP   cholecalciferol (Vitamin D3) (5000 Units/125 mcg) tab tablet Take 5,000 Units by mouth daily. Provider, Historical   pregabalin (LYRICA) 75 mg capsule Take 1 Cap by mouth three (3) times daily. 12/1/20   Tamara KENNY NP   sodium bicarbonate 650 mg tablet take 1 tablet by mouth 2 times a day 11/3/20   Tamara KENNY NP   insulin syringe-needle U-100 1 mL 32 gauge x 5/16\" syrg 1 Syringe by Does Not Apply route as needed (to use with humlin). 8/16/20   Tamara KENNY NP   metroNIDAZOLE (METROGEL) 0.75 % topical gel Apply  to affected area two (2) times a day. 8/6/20   Tamara KENNY NP   irbesartan (AVAPRO) 150 mg tablet TAKE 1 TABLET BY MOUTH DAILY FOR BLOOD PRESSURE 7/24/20   Jimy Bradford MD   FUROSEMIDE PO Take 20 mg by mouth daily as needed (edema). Provider, Historical   metoprolol tartrate (LOPRESSOR) 50 mg tablet TAKE 1 TABLET BY MOUTH TWICE DAILY 6/1/20   Renae Soto NP   insulin pump (PATIENT SUPPLIED) misc by SubCUTAneous route as needed. Provider, Historical   insulin lispro (HUMALOG) 100 unit/mL injection Given via pump. Pump set every 90 days in provider's office. Provider, Historical   Blood-Glucose Meter monitoring kit by Does Not Apply route two (2) times a day. 7/9/10   Mike Foreman MD   glucose blood VI test strips (ONE TOUCH ULTRA TEST) strip by Does Not Apply route.  One touch ultra mini test strips 7/7/10   Edy Monge MD     Patient Active Problem List   Diagnosis Code    Neuropathy G62.9    Diabetes mellitus ([facility] E11.9    Eye problems H57.9    Toe pain M79.676    Hypercholesteremia E78.00    Diabetic gastroparesis associated with type 2 diabetes mellitus ([address] E11.43, K31.84    Hypovitaminosis D E55.9    Paroxysmal atrial fibrillation ([facility]) I48.0    Chronic anticoagulation Z79.01    Transient cerebral ischemia G45.9    Renal insufficiency N28.9    Bad odor of urine R82.90    Type 2 diabetes mellitus with stage 4 chronic kidney disease, with long-term current use of insulin ([facility]) E11.22, N18.4, Z79.4    Encephalopathy acute G93.40    CKD (chronic kidney disease) stage 3, GFR 30-59 ml/min ([facility]) N18.30    Essential hypertension I10    Chronic renal impairment, stage 4 (severe) ([facility]) N18.4    Acute hyperkalemia E87.5    Acute blood loss anemia D62    PAF (paroxysmal atrial fibrillation) ([facility]) I48.0    Abnormal vaginal bleeding N93.9    Acute kidney injury superimposed on chronic kidney disease ([facility]) N17.9, N18.9     Patient Active Problem List    Diagnosis Date Noted    Acute hyperkalemia 05/15/2020    Acute blood loss anemia 05/15/2020    PAF (paroxysmal atrial fibrillation) ([facility]) 05/15/2020    Abnormal vaginal bleeding 05/15/2020    Acute kidney injury superimposed on chronic kidney disease ([facility] 05/15/2020    CKD (chronic kidney disease) stage 3, GFR 30-59 ml/min ([facility])     Essential hypertension 12/11/2019    Chronic renal impairment, stage 4 (severe) ([address] 12/11/2019    Encephalopathy acute 01/03/2019    Type 2 diabetes mellitus with stage 4 chronic kidney disease, with long-term current use of insulin ([address] 08/14/2018    Bad odor of urine 10/26/2017    Paroxysmal atrial fibrillation ([address] 04/08/2017    Chronic anticoagulation 04/08/2017    Transient cerebral ischemia 04/08/2017    Renal insufficiency 04/08/2017    Hypovitaminosis D 04/26/2012    Diabetic gastroparesis associated with type 2 diabetes mellitus ([address] 02/24/2012    Hypercholesteremia 06/21/2010    Neuropathy 05/20/2010    Diabetes mellitus ([address] 05/20/2010    Eye problems 05/20/2010    Toe pain 05/20/2010     Current Outpatient Medications   Medication Sig Dispense Refill    oxybutynin (DITROPAN) 5 mg tablet Take 1 Tab by mouth daily. 90 Tab 1    butalbital-acetaminophen-caffeine (FIORICET, ESGIC) -40 mg per tablet Take 1 Tab by mouth every six (6) hours as needed for Headache. 40 Tab 0    ondansetron (ZOFRAN ODT) 4 mg disintegrating tablet Take 1 Tab by mouth every eight (8) hours as needed for Nausea or Vomiting. 20 Tab 0    Xarelto 15 mg tab tablet TAKE 1 TABLET BY MOUTH DAILY 30 Tab 6    rosuvastatin (CRESTOR) 40 mg tablet Take 40 mg by mouth nightly.  magnesium oxide (MAG-OX) 400 mg tablet Take 400 mg by mouth daily.  valACYclovir (VALTREX) 1 gram tablet Take 1 tab daily x5 days for outbreak 30 Tab 2    amLODIPine (Norvasc) 5 mg tablet Take 1 Tab by mouth daily. 30 Tab 5    busPIRone (BUSPAR) 5 mg tablet Take 1 Tab by mouth two (2) times a day. 60 Tab 5    cholecalciferol (Vitamin D3) (5000 Units/125 mcg) tab tablet Take 5,000 Units by mouth daily.  pregabalin (LYRICA) 75 mg capsule Take 1 Cap by mouth three (3) times daily. 90 Cap 1    sodium bicarbonate 650 mg tablet take 1 tablet by mouth 2 times a day 60 Tab 2    insulin syringe-needle U-100 1 mL 32 gauge x 5/16\" syrg 1 Syringe by Does Not Apply route as needed (to use with humlin). 100 Each 1    metroNIDAZOLE (METROGEL) 0.75 % topical gel Apply  to affected area two (2) times a day. 60 g 0    irbesartan (AVAPRO) 150 mg tablet TAKE 1 TABLET BY MOUTH DAILY FOR BLOOD PRESSURE 90 Tab 3    FUROSEMIDE PO Take 20 mg by mouth daily as needed (edema).       metoprolol tartrate (LOPRESSOR) 50 mg tablet TAKE 1 TABLET BY MOUTH TWICE DAILY 180 Tab 6    insulin pump (PATIENT SUPPLIED) misc by SubCUTAneous route as needed.  insulin lispro (HUMALOG) 100 unit/mL injection Given via pump. Pump set every 90 days in provider's office.  Blood-Glucose Meter monitoring kit by Does Not Apply route two (2) times a day. 1 Kit 0    glucose blood VI test strips (ONE TOUCH ULTRA TEST) strip by Does Not Apply route. One touch ultra mini test strips 1 Package 11     Allergies   Allergen Reactions    Latex Hives    Contrast Agent [Iodine] Other (comments)     \"Burning with shooting pain\"    Macrobid [Nitrofurantoin Monohyd/M-Cryst] Diarrhea    Novolog Mix 70-30 [Insulin Asp Prt-Insulin Aspart] Nausea and Vomiting     Past Medical History:   Diagnosis Date    Cardiac echocardiogram 09/19/2016    [facility]:  Tech difficult. Pt refused 2nd attempt at bubble study. EF 60%. No WMA. Mild conc LVH. RVSP 20 mmHg. No atrial shunting by Doppler.  Cardiovascular lower extremity venous duplex 06/20/2016    No DVT bilaterally.  Carotid duplex 09/19/2016    Mild < 50% bilateral ICA stenosis.     Cerebral artery occlusion with cerebral infarction (HCC)     Chronic anticoagulation     Xarelto    CKD (chronic kidney disease) stage 3, GFR 30-59 ml/min ([facility]     Diabetes ([address]     Diabetic gastroparesis associated with type 2 diabetes mellitus ([address] 2/24/2012    Diabetic neuropathy ([address]     Diabetic retinopathy     Hypercholesterolemia     Hypertension     Hypovitaminosis D 4/26/2012    Currently on vitamin d therapy     PAF (paroxysmal atrial fibrillation) ([address]     Transient cerebral ischemia 4/8/2017    Type 2 diabetes mellitus with stage 4 chronic kidney disease, with long-term current use of insulin ([address] 8/14/2018     Past Surgical History:   Procedure Laterality Date    HX ORTHOPAEDIC  January 2013    right toe nail surgery Dr. Jacinta Catherine     Family History   Problem Relation Age of Onset    Diabetes Mother     Cancer Paternal Grandmother      Social History     Tobacco Use    Smoking status: Never Smoker    Smokeless tobacco: Never Used   Substance Use Topics    Alcohol use: No       ROS    Objective:   No flowsheet data found. General: alert, cooperative, no distress   Mental  status: normal mood, behavior, speech, dress, motor activity, and thought processes, able to follow commands   HENT: NCAT   Neck: no visualized mass   Resp: no respiratory distress   Neuro: no gross deficits   Skin: no discoloration or lesions of concern on visible areas   Psychiatric: normal affect, consistent with stated mood, no evidence of hallucinations     Additional exam findings: We discussed the expected course, resolution and complications of the diagnosis(es) in detail. Medication risks, benefits, costs, interactions, and alternatives were discussed as indicated. I advised her to contact the office if her condition worsens, changes or fails to improve as anticipated. She expressed understanding with the diagnosis(es) and plan. Sudheer Jiménez, was evaluated through a synchronous (real-time) audio-video encounter. The patient (or guardian if applicable) is aware that this is a billable service. Verbal consent to proceed has been obtained within the past 12 months. The visit was conducted pursuant to the emergency declaration under the [facility], [address] [place] [address] [facility] authority and the [employer] and [employer] General Act. Patient identification was verified, and a caregiver was present when appropriate. The patient was located in a state where the provider was credentialed to provide care.     Luis A Martinez NP

## 2021-06-02 ENCOUNTER — PATIENT MESSAGE (OUTPATIENT)
Dept: FAMILY MEDICINE CLINIC | Age: 36
End: 2021-06-02

## 2021-06-09 ENCOUNTER — OFFICE VISIT (OUTPATIENT)
Dept: ORTHOPEDIC SURGERY | Age: 36
End: 2021-06-09
Payer: MEDICARE

## 2021-06-09 ENCOUNTER — TELEPHONE (OUTPATIENT)
Dept: PHARMACY | Age: 36
End: 2021-06-09

## 2021-06-09 VITALS
RESPIRATION RATE: 16 BRPM | WEIGHT: 187 LBS | OXYGEN SATURATION: 96 % | HEIGHT: 62 IN | TEMPERATURE: 97.7 F | BODY MASS INDEX: 34.41 KG/M2 | HEART RATE: 81 BPM

## 2021-06-09 DIAGNOSIS — M79.671 RIGHT FOOT PAIN: ICD-10-CM

## 2021-06-09 DIAGNOSIS — S92.514A CLOSED NONDISPLACED FRACTURE OF PROXIMAL PHALANX OF LESSER TOE OF RIGHT FOOT, INITIAL ENCOUNTER: Primary | ICD-10-CM

## 2021-06-09 PROCEDURE — 28510 TREATMENT OF TOE FRACTURE: CPT | Performed by: ORTHOPAEDIC SURGERY

## 2021-06-09 PROCEDURE — 99203 OFFICE O/P NEW LOW 30 MIN: CPT | Performed by: ORTHOPAEDIC SURGERY

## 2021-06-09 PROCEDURE — G8432 DEP SCR NOT DOC, RNG: HCPCS | Performed by: ORTHOPAEDIC SURGERY

## 2021-06-09 PROCEDURE — G8756 NO BP MEASURE DOC: HCPCS | Performed by: ORTHOPAEDIC SURGERY

## 2021-06-09 PROCEDURE — 73630 X-RAY EXAM OF FOOT: CPT | Performed by: ORTHOPAEDIC SURGERY

## 2021-06-09 PROCEDURE — G8417 CALC BMI ABV UP PARAM F/U: HCPCS | Performed by: ORTHOPAEDIC SURGERY

## 2021-06-09 PROCEDURE — G8427 DOCREV CUR MEDS BY ELIG CLIN: HCPCS | Performed by: ORTHOPAEDIC SURGERY

## 2021-06-09 RX ORDER — SIMVASTATIN 40 MG/1
40 TABLET, FILM COATED ORAL
COMMUNITY
End: 2021-09-01 | Stop reason: ALTCHOICE

## 2021-06-09 RX ORDER — FUROSEMIDE 20 MG/1
20 TABLET ORAL DAILY
COMMUNITY
End: 2021-07-03

## 2021-06-09 NOTE — PROGRESS NOTES
AMBULATORY PROGRESS NOTE      Patient: Cynthia Mckoy             MRN: [id number]     SSN: [id number] Body mass index is 34.2 kg/m². YOB: 1985     AGE: 43 y.o. EX: female    PCP: Massiel Haywood NP       IMPRESSION //  DIAGNOSIS AND TREATMENT PLAN        Cynthia Mckoy has a diagnosis of:      She has a fracture fracture of the right fifth toe: Proximal phalanx: Connections conservative care: Grace tape #4-5 toes, using [place] shoe, 3-week follow, x-rays of her right foot upon next visit    She has remote fracture, healed fractures of the right second and third toe fractures, with some slight varus angulation. Indications for corrective osteotomies, will be worsening pain, worsening deformity, worsening adjacent toe calluses, or developmental wounds. Altered gait mechanics and altered balance, would be indication for surgical invention, as well. She is diabetic, not having a pain not have any wounds, recommendations be continued observation at this current time. If she required surgery corrective osteotomies of the second and third toes transfixed with either pins or dorsal nitinol staples    DIAGNOSES    1. Closed nondisplaced fracture of proximal phalanx of lesser toe of right foot, initial encounter    2. Right foot pain        Orders Placed This Encounter    AMB SUPPLY ORDER     R Hard soled shoe    [12538] Foot Min 3V     Order Specific Question:   Weight bearing? Answer:   No    furosemide (LASIX) 20 mg tablet     Sig: Take 20 mg by mouth daily.  simvastatin (ZOCOR) 40 mg tablet     Sig: Take 40 mg by mouth nightly. PLAN:    1. I will obtain 3-view x-ray of right foot in the office today. 2. I will grace tape her right fourth and fifth toe in the office today. 3. I talked about a possible osteonomy of right second and third toes if right toe pain does not subsides.   4. I will provide a DME order: right hard soled shoe to limit contact of right toes.       RTO-   3 weeks    Christo Squires  expresses understanding of the diagnosis, treatment plan, and all of their proposed questions were answered to their satisfaction. Patient education has been provided re the diagnoses. HPI //  OBJECTIVE EXAMINATION        Christo Squires IS A 43 y.o. female who is a/an  new patient, presenting to my outpatient office for evaluation of  the following chief complaint(s):     Chief Complaint   Patient presents with    Foot Pain     right foot pain, toe pain      Patient presents today right #2, #3 and #5 toes pain. Pt recalls hitting her right #2 and #3 toes on stairs after [facility], and endorsing swelling and pain of right #2 and #3 toe after the trauma. Pt states she could not WB and her right #2 and #3 toe pain was aggravated by the tongue of flip flops. She notes she hit her right fifth toe recently ~2 weeks ago. Pt mentions her right toes are crooked now. Visit Vitals  Pulse 81   Temp 97.7 °F (36.5 °C) (Temporal)   Resp 16   Ht 5' 2\" (1.575 m)   Wt 187 lb (84.8 kg)   SpO2 96%   BMI 34.20 kg/m²       Appearance: Alert, well appearing and pleasant patient who is in no distress, oriented to person, place/time, and who follows commands. This patient is accompanied in the examination room by her  self. There is signs of: no dementia  Psychiatric: Affect/mood are appropriate. Speech normal in context and clarity, memory intact grossly, no involuntary movements - tremors. Patient arrives to office via: without assistive device. H EENT (2): Head normocephalic & atraumatic.   Eye: pupils are round// EOM are intact // Neck: ROM WNL  // Hearings Intact   Respiratory: Breathing non labored     ANKLE/FOOT bilateral     Gait: normal  Tenderness: mild to right fifth toe   Cutaneous: mild swelling to right #2, #3, and #5 toes, skin discoloration of right #2, #3, and #4 toes  Joint Motion:   WNL  Joint / Tendon Stability: No Ankle or Subtalar instability or joint laxity. No peroneal sublux ability or dislocation  Alignment: neutral Hindfoot,    Neuro Motor/Sensory: NL/NL  Vascular: NL foot/ankle pulses,   Lymphatics: No extremity lymphedema, No calf swelling, no tenderness to calf muscles. CHART REVIEW     Pari Reyna has been experiencing pain and discomfort confirmed as outlined in the pain assessment outlined below.  was reviewed by Vidya Rodriguez MD on 6/9/2021. Pain Assessment  6/9/2021   Location of Pain Toe; Foot   Location Modifiers Right   Severity of Pain 1   Quality of Pain Aching; Throbbing   Duration of Pain A few hours   Frequency of Pain Intermittent   Aggravating Factors Walking;Standing   Relieving Factors Rest;Elevation   Result of Injury Yes   Type of Injury Other (Comment)        Pari Reyna  has a past medical history of Cardiac echocardiogram (09/19/2016), Cardiovascular lower extremity venous duplex (06/20/2016), Carotid duplex (09/19/2016), Cerebral artery occlusion with cerebral infarction ([address] Chronic anticoagulation, CKD (chronic kidney disease) stage 3, GFR 30-59 ml/min ([address] Diabetes ([address] Diabetic gastroparesis associated with type 2 diabetes mellitus ([address] (2/24/2012), Diabetic neuropathy ([address] Diabetic retinopathy, Hypercholesterolemia, Hypertension, Hypovitaminosis D (4/26/2012), PAF (paroxysmal atrial fibrillation) ([address] Transient cerebral ischemia (4/8/2017), and Type 2 diabetes mellitus with stage 4 chronic kidney disease, with long-term current use of insulin ([address] (8/14/2018). Patients is employed at:         Past Medical History:   Diagnosis Date    Cardiac echocardiogram 09/19/2016    [address]:  Tech difficult. Pt refused 2nd attempt at bubble study. EF 60%. No WMA. Mild conc LVH. RVSP 20 mmHg. No atrial shunting by Doppler.  Cardiovascular lower extremity venous duplex 06/20/2016    No DVT bilaterally.  Carotid duplex 09/19/2016    Mild < 50% bilateral ICA stenosis.  Cerebral artery occlusion with cerebral infarction (HCC)     Chronic anticoagulation     Xarelto    CKD (chronic kidney disease) stage 3, GFR 30-59 ml/min (HCC)     Diabetes ([facility] [address]     Diabetic gastroparesis associated with type 2 diabetes mellitus ([facility] [address] 2/24/2012    Diabetic neuropathy ([facility] [address]     Diabetic retinopathy     Hypercholesterolemia     Hypertension     Hypovitaminosis D 4/26/2012    Currently on vitamin d therapy     PAF (paroxysmal atrial fibrillation) ([facility] [address]     Transient cerebral ischemia 4/8/2017    Type 2 diabetes mellitus with stage 4 chronic kidney disease, with long-term current use of insulin ([facility] [address] 8/14/2018     Past Surgical History:   Procedure Laterality Date    HX ORTHOPAEDIC  January 2013    right toe nail surgery Dr. Charleen Steiner     Current Outpatient Medications   Medication Sig    furosemide (LASIX) 20 mg tablet Take 20 mg by mouth daily.  simvastatin (ZOCOR) 40 mg tablet Take 40 mg by mouth nightly.  metoclopramide HCl (REGLAN) 5 mg tablet TAKE 1 TABLET BY MOUTH FOUR TIMES DAILY BEFORE MEALS AND AT BEDTIME    amLODIPine (NORVASC) 10 mg tablet Take 1 Tablet by mouth daily.  pregabalin (LYRICA) 75 mg capsule Take 1 Capsule by mouth three (3) times daily.  butalbital-acetaminophen-caffeine (FIORICET, ESGIC) -40 mg per tablet Take 1 Tablet by mouth every six (6) hours as needed for Headache.  rosuvastatin (CRESTOR) 10 mg tablet Take 1 Tablet by mouth nightly.  oxybutynin (DITROPAN) 5 mg tablet Take 1 Tab by mouth daily.  ondansetron (ZOFRAN ODT) 4 mg disintegrating tablet Take 1 Tab by mouth every eight (8) hours as needed for Nausea or Vomiting.  Xarelto 15 mg tab tablet TAKE 1 TABLET BY MOUTH DAILY    valACYclovir (VALTREX) 1 gram tablet Take 1 tab daily x5 days for outbreak    busPIRone (BUSPAR) 5 mg tablet Take 1 Tab by mouth two (2) times a day.  cholecalciferol (Vitamin D3) (5000 Units/125 mcg) tab tablet Take 5,000 Units by mouth daily.  sodium bicarbonate 650 mg tablet take 1 tablet by mouth 2 times a day    insulin syringe-needle U-100 1 mL 32 gauge x 5/16\" syrg 1 Syringe by Does Not Apply route as needed (to use with humlin).  metroNIDAZOLE (METROGEL) 0.75 % topical gel Apply  to affected area two (2) times a day.  irbesartan (AVAPRO) 150 mg tablet TAKE 1 TABLET BY MOUTH DAILY FOR BLOOD PRESSURE    FUROSEMIDE PO Take 20 mg by mouth daily as needed (edema).  metoprolol tartrate (LOPRESSOR) 50 mg tablet TAKE 1 TABLET BY MOUTH TWICE DAILY    insulin pump (PATIENT SUPPLIED) [facility] by SubCUTAneous route as needed.  insulin lispro (HUMALOG) 100 unit/mL injection Given via pump. Pump set every 90 days in provider's office.  Blood-Glucose Meter monitoring kit by Does Not Apply route two (2) times a day.  glucose blood VI test strips (ONE TOUCH ULTRA TEST) strip by Does Not Apply route. One touch ultra mini test strips     No current facility-administered medications for this visit.      Allergies   Allergen Reactions    Latex Hives    Contrast Agent [Iodine] Other (comments)     \"Burning with shooting pain\"    Macrobid [Nitrofurantoin Monohyd/M-Cryst] Diarrhea    Novolog Mix 70-30 [Insulin Asp Prt-Insulin Aspart] Nausea and Vomiting     Social History     Occupational History     Comment: applying for disability (diabetes)   Tobacco Use    Smoking status: Never Smoker    Smokeless tobacco: Never Used   Substance and Sexual Activity    Alcohol use: No    Drug use: No    Sexual activity: Never     Family History   Problem Relation Age of Onset    Diabetes Mother     Cancer Paternal Grandmother         DIAGNOSTIC LAB DATA      Lab Results   Component Value Date/Time    Hemoglobin A1c 12.4 (H) 03/09/2021 10:47 AM    Hemoglobin A1c 10.5 (H) 08/18/2020 02:58 PM    Hemoglobin A1c 11.0 (H) 07/11/2019 12:03 PM    Hemoglobin A1c, External 9.5 02/06/2020 12:00 AM    Hemoglobin A1c, External 8.9 10/31/2019 12:00 AM    Hemoglobin A1c, External 8.0 01/10/2019 12:00 AM    //   Lab Results   Component Value Date/Time    Glucose 179 (H) 03/09/2021 10:47 AM    Glucose (POC) 240 (H) 05/17/2020 11:58 AM        Lab Results   Component Value Date/Time    Hemoglobin A1c, External 9.5 02/06/2020 12:00 AM    Hemoglobin A1c (POC) 8.5 05/04/2018 10:25 AM         Lab Results   Component Value Date/Time    Vitamin D 25-Hydroxy 11.9 (L) 08/18/2020 03:02 PM         REVIEW OF SYSTEMS : 6/9/2021  ALL BELOW ARE Negative except : SEE HPI     All other systems reviewed and are negative. 12 point review of systems otherwise negative unless noted in HPI. DIAGNOSTIC IMAGING /ORDERS     X-rays, 3 views, the right foot, AP, lateral oblique: There is a healed right second and and third toe fractures proximal phalangeal fractures distal one third regions, some slight varus angulation. There is a acute, right fifth toe proximal phalangeal fracture at the base region: With normal alignment on these 3 views. Otherwise no osteolytic or osteoblastic lesions are seen. I have reviewed the results of the above study. The interpretation of this study is my professional opinion. On this date 06/09/2021 I have spent 22 minutes reviewing previous notes, test results and face to face with the patient discussing the diagnosis and importance of compliance with the treatment plan as well as documenting on the day of the visit. An electronic signature was used to authenticate this note. Disclaimer: Sections of this note are dictated using utilizing voice recognition software, which may have resulted in some phonetic based errors in grammar and contents. Even though attempts were made to correct all the mistakes, some may have been missed, and remained in the body of the document. If questions arise, please contact our department. Joetravis Mckoy may have a reminder for a \"due or due soon\" health maintenance.  I have asked that she contact her primary care provider for follow-up on this health maintenance. Winter Kumari, as dictated by Luly Hanson MD  6/9/2021  9:01 AM

## 2021-06-09 NOTE — TELEPHONE ENCOUNTER
CLINICAL PHARMACY: ADHERENCE REVIEW  Identified care gap per [facility]; fills at [facility]: ACE/ARB adherence    Last Visit: 5/26/21    Patient also appears to be prescribed: statin, DM    Patient found in Outcomes MTM and is not currently eligible for CMR/TIP    ASSESSMENT  ACE/ARB ADHERENCE    Per Insurance Records through 6/6/21 (2020 [place] = <80%; YTD [place] = 72%; Potential Fail Date: 7/8/21): Irbesartan 150mg last filled on 2/3/21 for 90 day supply. Next refill due: 5/3/21    Per chart, irbesartan 150mg daily last rx'd 7/24/20 for #90, 3 refills    BP Readings from Last 3 Encounters:   03/09/21 111/77   06/04/20 130/84   05/17/20 127/88     Estimated Creatinine Clearance: 19.2 mL/min (A) (by C-G formula based on SCr of 3.93 mg/dL (H)). DIABETES ADHERENCE    Per Insurance Records through 6/6/21: not reported (d/t insulin)    Lab Results   Component Value Date/Time    Hemoglobin A1c 12.4 (H) 03/09/2021 10:47 AM    Hemoglobin A1c 10.5 (H) 08/18/2020 02:58 PM    Hemoglobin A1c, External 9.5 02/06/2020 12:00 AM     STATIN ADHERENCE    Per Insurance Records through 6/6/21 (2020 [place] = <80%; YTD PDC = 100%; Potential Fail Date: 10/6/21):   Rosuvastatin 10mg last filled on 5/26/21 for 90 day supply.  Next refill due: 8/23/21    Per chart, appears to be new rosuvastatin 10mg daily rx, 5/26/21, #90 with 1 refill (simvastatin rx in 2020)    Lab Results   Component Value Date/Time    Cholesterol, total 158 03/09/2021 10:47 AM    Cholesterol (POC) 267 01/25/2013 12:05 PM    HDL Cholesterol 43 03/09/2021 10:47 AM    HDL Cholesterol (POC) 37 01/25/2013 12:05 PM    LDL Cholesterol (POC) 185 01/25/2013 12:05 PM    LDL, calculated 73 03/09/2021 10:47 AM    LDL, calculated 93.4 08/18/2020 02:59 PM    VLDL, calculated 42 (H) 03/09/2021 10:47 AM    VLDL, calculated 15.6 08/18/2020 02:59 PM    Triglyceride 262 (H) 03/09/2021 10:47 AM    Triglycerides (POC) 225 01/25/2013 12:05 PM    CHOL/HDL Ratio 3.0 08/18/2020 02:59 PM     ALT (SGPT) Date Value Ref Range Status   03/09/2021 11 0 - 32 IU/L Final     AST (SGOT)   Date Value Ref Range Status   03/09/2021 17 0 - 40 IU/L Final     The ASCVD Risk score (Hali Covington., et al., 2013) failed to calculate for the following reasons: The 2013 ASCVD risk score is only valid for ages 36 to 78    The patient has a prior MI or stroke diagnosis     PLAN  The following are interventions that have been identified:  - Patient overdue refilling irbesartan (refill was due 5/3; 2020 PDC <80% - should have refill remaining) and active on home medication list  - Taking/tolerating rosuvastatin? (appears new rx @5/26; 2020 statin PDC <80% - was simvastatin in 2020?)    Attempting to reach patient to review.  Left message asking for return call.     Future Appointments   Date Time Provider Brian Clifford   6/9/2021  1:00 PM Niraj Hanson MD [facility] BS AMB Eartha Boast, PharmD, [id number]   Direct: [phone]  Department, toll free: [phone], option 7

## 2021-06-10 NOTE — TELEPHONE ENCOUNTER
Patient returning call. States she takes irbesartan everyday; denies missing doses or ADRs. Reviewed last refill hx available and patient then does admit she likely misses at times of migraines with nausea/vomiting when she can't keep anything down. Discussed with patient re different time or missed doses; has ondansetron prn rx.  Reviewed that she should have refills remaining before rx expires in July.    =========================================================   For Pharmacy [address] in place: No   Recommendation Provided To: Patient/Caregiver: 1 via Telephone   Intervention Detail: Adherence Monitorin   Gap Closed?: Yes   Total # of Interventions Recommended: 1   Total # of Interventions Accepted: 1   Intervention Accepted By: Patient/Caregiver: 1   Time Spent (min): 15

## 2021-06-11 RX ORDER — BUSPIRONE HYDROCHLORIDE 5 MG/1
5 TABLET ORAL 2 TIMES DAILY
Qty: 180 TABLET | Refills: 1 | Status: SHIPPED | OUTPATIENT
Start: 2021-06-11

## 2021-06-11 RX ORDER — INSULIN LISPRO 100 [IU]/ML
INJECTION, SOLUTION INTRAVENOUS; SUBCUTANEOUS
Qty: 1 VIAL | Refills: 0 | Status: SHIPPED | OUTPATIENT
Start: 2021-06-11 | End: 2021-12-24

## 2021-06-30 ENCOUNTER — OFFICE VISIT (OUTPATIENT)
Dept: ORTHOPEDIC SURGERY | Age: 36
End: 2021-06-30
Payer: MEDICARE

## 2021-06-30 VITALS
WEIGHT: 184 LBS | BODY MASS INDEX: 33.86 KG/M2 | HEIGHT: 62 IN | OXYGEN SATURATION: 99 % | HEART RATE: 103 BPM | TEMPERATURE: 97.3 F

## 2021-06-30 DIAGNOSIS — S92.514D CLOSED NONDISPLACED FRACTURE OF PROXIMAL PHALANX OF LESSER TOE OF RIGHT FOOT WITH ROUTINE HEALING, SUBSEQUENT ENCOUNTER: Primary | ICD-10-CM

## 2021-06-30 DIAGNOSIS — M79.671 RIGHT FOOT PAIN: ICD-10-CM

## 2021-06-30 PROCEDURE — G8417 CALC BMI ABV UP PARAM F/U: HCPCS | Performed by: ORTHOPAEDIC SURGERY

## 2021-06-30 PROCEDURE — G8510 SCR DEP NEG, NO PLAN REQD: HCPCS | Performed by: ORTHOPAEDIC SURGERY

## 2021-06-30 PROCEDURE — 99024 POSTOP FOLLOW-UP VISIT: CPT | Performed by: ORTHOPAEDIC SURGERY

## 2021-06-30 PROCEDURE — G8756 NO BP MEASURE DOC: HCPCS | Performed by: ORTHOPAEDIC SURGERY

## 2021-06-30 PROCEDURE — G8427 DOCREV CUR MEDS BY ELIG CLIN: HCPCS | Performed by: ORTHOPAEDIC SURGERY

## 2021-06-30 PROCEDURE — 73630 X-RAY EXAM OF FOOT: CPT | Performed by: ORTHOPAEDIC SURGERY

## 2021-06-30 NOTE — PROGRESS NOTES
AMBULATORY PROGRESS NOTE      Patient: Tran Alberts             MRN: [id number]     SSN: [id number] Body mass index is 33.65 kg/m². YOB: 1985     AGE: 43 y.o. EX: female    PCP: Haydee Larry NP       IMPRESSION //  DIAGNOSIS AND TREATMENT PLAN        Tran Alberts has a diagnosis of:      As such, patient has healed remote fractures from her right second and third toes at the proximal phalangeal regions, and has a healing right fifth toe proximal phalangeal fracture of the proximal extent. She does have neuropathy sensory neuropathy, in her toes and admits having no pain in her fifth toe at this current time. I cautioned her to this to be careful and I cautioned her the signs of Charcot neuroarthropathy should they develop that we need to see her right away. But clinically she is doing much better she is wearing sandals and having no pain at to her right fifth toe or any of her toes at this current time. The swelling I can tell, is minimal. I reassured her still to protect her foot and wear firm soled shoes, release another 4 weeks and an otherwise we will see her as needed. DIAGNOSES    1. Closed nondisplaced fracture of proximal phalanx of lesser toe of right foot with routine healing, subsequent encounter    2. Right foot pain        Orders Placed This Encounter    AMB POC XRAY, FOOT; COMPLETE, 3+ VIEW     ASK ALL FEMALE PATIENTS IF PREGNANT     Order Specific Question:   Reason for Exam     Answer:   PAIN     Order Specific Question:   Is Patient Pregnant? Answer:   Unknown        PLAN:    1. I will obtain 3-view x-ray of right foot in the office today. 2. I will advise pt to do what is comfortable and be cautious with her right fifth toe. RTO-  PRN    Tran Alberts  expresses understanding of the diagnosis, treatment plan, and all of their proposed questions were answered to their satisfaction.  Patient education has been provided re the diagnoses. HPI //  Christiano Lindo 25 IS A 43 y.o. female who is a/an  established patient, presenting to my outpatient office for evaluation of  the following chief complaint(s):     Chief Complaint   Patient presents with    Toe Pain     right second and fifth toe     At LOV patient reported right second, third, and fourth toe pain and was diagnosed with closed nondisplaced fracture of proximal phalanx of lesser toe of right foot. Prev trauma to these right lesser digit toes. I obtained 3-view x-ray of right foot in the office and grace taped her right fourth and fifth toe. I talked about a possible osteonomy of right second and third toe if right toe pain did not subsided. I provided a DME order; right hard soled shoe to limit contact of right toes. Since LOV pt reports no right fifth toe pain. Denies any trouble going up stairs. She notes she has diabetic neuropathy, which she takes Lyrica for. H/o of DM since the age of 21. Visit Vitals  Pulse (!) 103   Temp 97.3 °F (36.3 °C) (Temporal)   Ht 5' 2\" (1.575 m)   Wt 184 lb (83.5 kg)   SpO2 99%   BMI 33.65 kg/m²       Appearance: Alert, well appearing and pleasant patient who is in no distress, oriented to person, place/time, and who follows commands. This patient is accompanied in the examination room by her  self. There is signs of: no dementia  Psychiatric: Affect/mood are appropriate. Speech normal in context and clarity, memory intact grossly, no involuntary movements - tremors. Patient arrives to office via: without assistive device. H EENT (2): Head normocephalic & atraumatic. Eye: pupils are round// EOM are intact // Neck: ROM WNL  // Hearings Intact   Respiratory: Breathing non labored     ANKLE/FOOT right    Gait: normal  Tenderness: none   Cutaneous: WNL. Joint Motion:  WNL  Joint / Tendon Stability: No Ankle or Subtalar instability or joint laxity.                        No peroneal sublux ability or dislocation  Alignment: neutral Hindfoot,    Neuro Motor/Sensory: NL/NL  Vascular: NL foot/ankle pulses,   Lymphatics: No extremity lymphedema, No calf swelling, no tenderness to calf muscles. CHART REVIEW     Pablo Crawford has been experiencing pain and discomfort confirmed as outlined in the pain assessment outlined below.  was reviewed by Lois Barillas MD on 6/30/2021. Pain Assessment  6/30/2021   Location of Pain Toe   Location Modifiers Right   Severity of Pain 10   Quality of Pain -   Duration of Pain -   Frequency of Pain -   Aggravating Factors -   Relieving Factors -   Result of Injury -   Type of Injury -        Pablo Crawford  has a past medical history of Cardiac echocardiogram (09/19/2016), Cardiovascular lower extremity venous duplex (06/20/2016), Carotid duplex (09/19/2016), Cerebral artery occlusion with cerebral infarction ([address] Chronic anticoagulation, CKD (chronic kidney disease) stage 3, GFR 30-59 ml/min ([address] Diabetes ([address] Diabetic gastroparesis associated with type 2 diabetes mellitus ([address] (2/24/2012), Diabetic neuropathy ([address] Diabetic retinopathy, Hypercholesterolemia, Hypertension, Hypovitaminosis D (4/26/2012), PAF (paroxysmal atrial fibrillation) ([address] Transient cerebral ischemia (4/8/2017), and Type 2 diabetes mellitus with stage 4 chronic kidney disease, with long-term current use of insulin ([address] (8/14/2018). Patients is employed at:         Past Medical History:   Diagnosis Date    Cardiac echocardiogram 09/19/2016    [address]:  Tech difficult. Pt refused 2nd attempt at bubble study. EF 60%. No WMA. Mild conc LVH. RVSP 20 mmHg. No atrial shunting by Doppler.  Cardiovascular lower extremity venous duplex 06/20/2016    No DVT bilaterally.  Carotid duplex 09/19/2016    Mild < 50% bilateral ICA stenosis.     Cerebral artery occlusion with cerebral infarction (HCC)     Chronic anticoagulation     Xarelto    CKD (chronic kidney disease) stage 3, GFR 30-59 ml/min (HCC)     Diabetes ([facility]     Diabetic gastroparesis associated with type 2 diabetes mellitus ([facility] 2/24/2012    Diabetic neuropathy [facility]     Diabetic retinopathy     Hypercholesterolemia     Hypertension     Hypovitaminosis D 4/26/2012    Currently on vitamin d therapy     PAF (paroxysmal atrial fibrillation) ([facility]     Transient cerebral ischemia 4/8/2017    Type 2 diabetes mellitus with stage 4 chronic kidney disease, with long-term current use of insulin ([facility] 8/14/2018     Past Surgical History:   Procedure Laterality Date    HX ORTHOPAEDIC  January 2013    right toe nail surgery Dr. Joel Gonzales     Current Outpatient Medications   Medication Sig    insulin lispro (HumaLOG U-100 Insulin) 100 unit/mL injection Given via pump. Pump set every 90 days in provider's office.  busPIRone (BUSPAR) 5 mg tablet Take 1 Tablet by mouth two (2) times a day.  furosemide (LASIX) 20 mg tablet Take 20 mg by mouth daily.  metoclopramide HCl (REGLAN) 5 mg tablet TAKE 1 TABLET BY MOUTH FOUR TIMES DAILY BEFORE MEALS AND AT BEDTIME    amLODIPine (NORVASC) 10 mg tablet Take 1 Tablet by mouth daily.  pregabalin (LYRICA) 75 mg capsule Take 1 Capsule by mouth three (3) times daily.  butalbital-acetaminophen-caffeine (FIORICET, ESGIC) -40 mg per tablet Take 1 Tablet by mouth every six (6) hours as needed for Headache.  rosuvastatin (CRESTOR) 10 mg tablet Take 1 Tablet by mouth nightly.  oxybutynin (DITROPAN) 5 mg tablet Take 1 Tab by mouth daily.  ondansetron (ZOFRAN ODT) 4 mg disintegrating tablet Take 1 Tab by mouth every eight (8) hours as needed for Nausea or Vomiting.  Xarelto 15 mg tab tablet TAKE 1 TABLET BY MOUTH DAILY    valACYclovir (VALTREX) 1 gram tablet Take 1 tab daily x5 days for outbreak    cholecalciferol (Vitamin D3) (5000 Units/125 mcg) tab tablet Take 5,000 Units by mouth daily.     sodium bicarbonate 650 mg tablet take 1 tablet by mouth 2 times a day    insulin syringe-needle U-100 1 mL 32 gauge x 5/16\" syrg 1 Syringe by Does Not Apply route as needed (to use with humlin).  metroNIDAZOLE (METROGEL) 0.75 % topical gel Apply  to affected area two (2) times a day.  irbesartan (AVAPRO) 150 mg tablet TAKE 1 TABLET BY MOUTH DAILY FOR BLOOD PRESSURE    FUROSEMIDE PO Take 20 mg by mouth daily as needed (edema).  metoprolol tartrate (LOPRESSOR) 50 mg tablet TAKE 1 TABLET BY MOUTH TWICE DAILY    insulin pump (PATIENT SUPPLIED) misc by SubCUTAneous route as needed.  Blood-Glucose Meter monitoring kit by Does Not Apply route two (2) times a day.  glucose blood VI test strips (ONE TOUCH ULTRA TEST) strip by Does Not Apply route. One touch ultra mini test strips    simvastatin (ZOCOR) 40 mg tablet Take 40 mg by mouth nightly. (Patient not taking: Reported on 6/30/2021)     No current facility-administered medications for this visit.      Allergies   Allergen Reactions    Latex Hives    Contrast Agent [Iodine] Other (comments)     \"Burning with shooting pain\"    Macrobid [Nitrofurantoin Monohyd/M-Cryst] Diarrhea    Novolog Mix 70-30 [Insulin Asp Prt-Insulin Aspart] Nausea and Vomiting     Social History     Occupational History     Comment: applying for disability (diabetes)   Tobacco Use    Smoking status: Never Smoker    Smokeless tobacco: Never Used   Substance and Sexual Activity    Alcohol use: No    Drug use: No    Sexual activity: Never     Family History   Problem Relation Age of Onset    Diabetes Mother     Cancer Paternal Grandmother         DIAGNOSTIC LAB DATA      Lab Results   Component Value Date/Time    Hemoglobin A1c 12.4 (H) 03/09/2021 10:47 AM    Hemoglobin A1c 10.5 (H) 08/18/2020 02:58 PM    Hemoglobin A1c 11.0 (H) 07/11/2019 12:03 PM    Hemoglobin A1c, External 9.5 02/06/2020 12:00 AM    Hemoglobin A1c, External 8.9 10/31/2019 12:00 AM    Hemoglobin A1c, External 8.0 01/10/2019 12:00 AM    //   Lab Results   Component Value Date/Time    Glucose 179 (H) 03/09/2021 10:47 AM    Glucose (POC) 240 (H) 05/17/2020 11:58 AM        Lab Results   Component Value Date/Time    Hemoglobin A1c, External 9.5 02/06/2020 12:00 AM    Hemoglobin A1c (POC) 8.5 05/04/2018 10:25 AM         Lab Results   Component Value Date/Time    Vitamin D 25-Hydroxy 11.9 (L) 08/18/2020 03:02 PM         REVIEW OF SYSTEMS : 6/30/2021  ALL BELOW ARE Negative except : SEE HPI     All other systems reviewed and are negative. 12 point review of systems otherwise negative unless noted in HPI. DIAGNOSTIC IMAGING /ORDERS       Orders Placed This Encounter    AMB POC XRAY, FOOT; COMPLETE, 3+ VIEW     ASK ALL FEMALE PATIENTS IF PREGNANT     Order Specific Question:   Reason for Exam     Answer:   PAIN     Order Specific Question:   Is Patient Pregnant? Answer:   Unknown        Three-view, right foot, there is a healed right second toe fracture, right third toe fractures, distal end of the proximal phalanx. There is no dislocation. There is a fracture to the fifth toe, proximal phalanx, at the proximal portion of the proximal phalanx without solution dislocation. I have reviewed the results of the above study. The interpretation of this study is my professional opinion. An electronic signature was used to authenticate this note. Disclaimer: Sections of this note are dictated using utilizing voice recognition software, which may have resulted in some phonetic based errors in grammar and contents. Even though attempts were made to correct all the mistakes, some may have been missed, and remained in the body of the document. If questions arise, please contact our department. Sena Malagon may have a reminder for a \"due or due soon\" health maintenance. I have asked that she contact her primary care provider for follow-up on this health maintenance.     Anna Guerrero, as dictated by Temo Torres MD  6/30/2021  9:26 AM

## 2021-07-01 ENCOUNTER — TELEPHONE (OUTPATIENT)
Dept: FAMILY MEDICINE CLINIC | Age: 36
End: 2021-07-01

## 2021-07-01 NOTE — TELEPHONE ENCOUNTER
PA is required for Insulin Lispro    Cover My Meds Key:  Qasim Carreon Guillory:  [id number] - PA Case ID: [id number]

## 2021-07-04 PROBLEM — N17.9 AKI (ACUTE KIDNEY INJURY) (HCC): Status: ACTIVE | Noted: 2021-07-04

## 2021-07-04 PROBLEM — R19.7 NAUSEA VOMITING AND DIARRHEA: Status: ACTIVE | Noted: 2021-07-04

## 2021-07-04 PROBLEM — E86.0 DEHYDRATION: Status: ACTIVE | Noted: 2021-07-04

## 2021-07-04 PROBLEM — R73.9 HYPERGLYCEMIA: Status: ACTIVE | Noted: 2021-07-04

## 2021-07-04 PROBLEM — R11.2 NAUSEA VOMITING AND DIARRHEA: Status: ACTIVE | Noted: 2021-07-04

## 2021-07-06 NOTE — TELEPHONE ENCOUNTER
Jevon Corbin (Guillory: [id number]) - [id number]  Insulin Lispro 100UNIT/ML solution       Status: PA Request    Created: July 1st, 2021 [phone]    Sent: July 6th, 2021

## 2021-07-09 ENCOUNTER — PATIENT OUTREACH (OUTPATIENT)
Dept: CASE MANAGEMENT | Age: 36
End: 2021-07-09

## 2021-07-09 NOTE — PROGRESS NOTES
Care Transitions Initial Call    Call within 2 business days of discharge: Yes     Patient: Ezequiel Magallon Patient : 1985 MRN: [id number]    Last Discharge [address]       Complaint Diagnosis Description Type Department Provider    7/3/21 Vomiting Acute renal failure, unspecified acute renal failure type ([facility] . .. ED to Hosp-Admission (Discharged) (ADMIT) Hoda Garcia DO; Jason. .. Was this an external facility discharge? Yes, 2021 Discharge Facility: [facility]. Please see EMR for additional information. Care Transitions Nurse ( CTN) attempted to contact patient via telephone call regarding hospital discharge follow up/Transition of Care. There was no response. A voicemail message was left requesting a non-emergency return telephone call. CTN  contact information provided. No patient medical information was left on the voicemail message. Care Transitions Nurse ( CTN) submitted request for assistance with scheduling PCP follow up appointment.

## 2021-07-12 ENCOUNTER — TELEPHONE (OUTPATIENT)
Dept: FAMILY MEDICINE CLINIC | Age: 36
End: 2021-07-12

## 2021-08-06 ENCOUNTER — OFFICE VISIT (OUTPATIENT)
Dept: FAMILY MEDICINE CLINIC | Age: 36
End: 2021-08-06
Payer: MEDICARE

## 2021-08-06 VITALS
BODY MASS INDEX: 34.78 KG/M2 | TEMPERATURE: 97.5 F | HEART RATE: 77 BPM | DIASTOLIC BLOOD PRESSURE: 90 MMHG | SYSTOLIC BLOOD PRESSURE: 147 MMHG | HEIGHT: 62 IN | RESPIRATION RATE: 16 BRPM | WEIGHT: 189 LBS | OXYGEN SATURATION: 97 %

## 2021-08-06 DIAGNOSIS — Z09 HOSPITAL DISCHARGE FOLLOW-UP: ICD-10-CM

## 2021-08-06 DIAGNOSIS — N18.4 STAGE 4 CHRONIC KIDNEY DISEASE (HCC): ICD-10-CM

## 2021-08-06 DIAGNOSIS — R19.7 NAUSEA VOMITING AND DIARRHEA: ICD-10-CM

## 2021-08-06 DIAGNOSIS — I48.0 PAROXYSMAL ATRIAL FIBRILLATION (HCC): ICD-10-CM

## 2021-08-06 DIAGNOSIS — R11.2 NAUSEA VOMITING AND DIARRHEA: ICD-10-CM

## 2021-08-06 DIAGNOSIS — I10 ESSENTIAL HYPERTENSION: Primary | ICD-10-CM

## 2021-08-06 DIAGNOSIS — R13.10 ODYNOPHAGIA: ICD-10-CM

## 2021-08-06 PROCEDURE — G8427 DOCREV CUR MEDS BY ELIG CLIN: HCPCS | Performed by: NURSE PRACTITIONER

## 2021-08-06 PROCEDURE — 1111F DSCHRG MED/CURRENT MED MERGE: CPT | Performed by: NURSE PRACTITIONER

## 2021-08-06 PROCEDURE — G8755 DIAS BP > OR = 90: HCPCS | Performed by: NURSE PRACTITIONER

## 2021-08-06 PROCEDURE — 99214 OFFICE O/P EST MOD 30 MIN: CPT | Performed by: NURSE PRACTITIONER

## 2021-08-06 PROCEDURE — G8432 DEP SCR NOT DOC, RNG: HCPCS | Performed by: NURSE PRACTITIONER

## 2021-08-06 PROCEDURE — G8753 SYS BP > OR = 140: HCPCS | Performed by: NURSE PRACTITIONER

## 2021-08-06 PROCEDURE — G8417 CALC BMI ABV UP PARAM F/U: HCPCS | Performed by: NURSE PRACTITIONER

## 2021-08-06 RX ORDER — INSULIN LISPRO 100 [IU]/ML
INJECTION, SOLUTION INTRAVENOUS; SUBCUTANEOUS
Qty: 1 VIAL | Refills: 0 | Status: CANCELLED
Start: 2021-08-06

## 2021-08-06 RX ORDER — FLUCONAZOLE 150 MG/1
150 TABLET ORAL
Qty: 2 TABLET | Refills: 3 | Status: SHIPPED | OUTPATIENT
Start: 2021-08-06 | End: 2021-10-18 | Stop reason: SDUPTHER

## 2021-08-06 RX ORDER — SODIUM BICARBONATE 650 MG/1
TABLET ORAL
Qty: 60 TABLET | Refills: 5 | Status: SHIPPED | OUTPATIENT
Start: 2021-08-06 | End: 2021-12-24

## 2021-08-06 RX ORDER — METOPROLOL TARTRATE 100 MG/1
100 TABLET ORAL 2 TIMES DAILY
Qty: 180 TABLET | Refills: 0 | Status: SHIPPED | OUTPATIENT
Start: 2021-08-06 | End: 2021-10-18 | Stop reason: SDUPTHER

## 2021-08-06 NOTE — PROGRESS NOTES
Reymundo Blair is a 43 y.o. female who was seen in clinic today (8/6/2021) for Hospital Follow Up    [address] [place] [address]:   Diagnoses and all orders for this visit:    1. Essential hypertension    2. Hospital discharge follow-up    3. Stage 4 chronic kidney disease ([address]    4. Odynophagia    5. Paroxysmal atrial fibrillation (HCC)    6. Nausea vomiting and diarrhea    Other orders  -     sodium bicarbonate 650 mg tablet; take 1 tablet by mouth 2 times a day  -     fluconazole (DIFLUCAN) 150 mg tablet; Take 1 Tablet by mouth every seven (7) days for 2 doses. -     metoprolol tartrate (LOPRESSOR) 100 mg IR tablet; Take 1 Tablet by mouth two (2) times a day. I have discussed the diagnosis with the patient and the intended plan as seen in the above orders. The patient has received an after-visit summary and questions were answered concerning future plans. I have discussed medication side effects and warnings with the patient as well. Patient agreeable with above plan and verbalizes understanding. Follow-up and Dispositions    · Return in about 2 weeks (around 8/20/2021) for HTN since beginning new medication, virtual follow up. Subjective:   Patient was admitted to the hospital on 7/3/2021 and left AMA on 7/15/2021  Discharge Diagnosis:      Patient left AMA  1. Nausea vomiting, diarrhea with abdominal pain, likely acute gastroenteritis  2. Acute kidney injury on CKD stage 4   3. Acute on chronic anemia secondary to dilutional effect, chronic anemia due to kidney disease  4. E. coli UTI, POA  5. Odynophagia  6. Paroxysmal A. Fib  7. Diabetes mellitus type 2  8. Diabetic neuropathy  9. Diabetic retinopathy  10. Hypertension  11. Hyperlipidemia  12. Past medical history of TIA  Hospital Course:    Patient received 2 units of PRBC  Continue IV bicarb drip per Dr. Caryn Mcneil  CT abdomen showed mild colitis.    continue IV Cipro and Flagyl for short course.   CT abdomen as well as renal ultrasound did not show hydronephrosis.  However, did show bladder distention. Postvoid residual 300 cc,  Will continue Flomax.  Patient likely will need to learn self cath. Avoid renal toxins  Consulted GI for odynophagia, status post EGD, EGD report as below. Hold Xarelto due to kidney failure   continue aspirin  Glucomander protocol, sensitive type. DVT prophylaxis with heparin subcu  Advanced care plan: Patient full code  ENDOSCOPIC DIAGNOSIS:  Severe esophagitis, likely reflux-induced. Rule out Candida. .. Small, sliding hiatus hernia. Non-specific, non-erosive gastritis. Biopsies taken. to rule out H pylori infection. RECOMMENDATIONS:  Check biospies. Pantoprazole 40 bid.     HPI on Admission (per admitting physician):  Sheilla Ormond a 43 y. o. female with PMH sig for diabetes, hypertension, hyperlipidemia, chronic kidney disease came to the [facility] complaining of vomiting, diarrhea, abdominal pain.  Patient reports that her vomiting and diarrhea started 3 to 4 days ago. [place] notes 3-4 episodes of nonbloody emesis today, and 2 episodes of nonbloody diarrhea today.  She denies any recent antibiotic use or hospitalizations.  She notes that today she started exhibiting pain to her lower abdomen which she describes as dull.  She denies any fever or chills, chest pain or shortness of breath. Patient was seen by RODY Martinez for diabetes 2 days ago. Reports she has insulin pump training next Wednesday. Patient states her blood pressure has been 'up and down' over the last several months. Reports she has not followed up with nephrologist or cardiologist.  She further expresses concerns regarding swelling to bilateral lower extremities.     Lab Results   Component Value Date/Time    Sodium 142 07/08/2021 05:17 AM    Potassium 3.8 07/08/2021 05:17 AM    Chloride 110 (H) 07/08/2021 05:17 AM    CO2 24 07/08/2021 05:17 AM    Anion gap 8 07/08/2021 05:17 AM    Glucose 100 07/08/2021 05:17 AM    BUN 57 (H) 07/08/2021 05:17 AM    Creatinine 6.0 (H) 07/08/2021 05:17 AM    BUN/Creatinine ratio 10 03/09/2021 10:47 AM    GFR est AA 10.0 07/08/2021 05:17 AM    GFR est non-AA 8 07/08/2021 05:17 AM    Calcium 7.7 (L) 07/08/2021 05:17 AM    Bilirubin, total 0.3 07/03/2021 10:33 PM    Alk.  phosphatase 112 07/03/2021 10:33 PM    Protein, total 8.7 (H) 07/03/2021 10:33 PM    Albumin 2.4 (L) 07/08/2021 05:17 AM    Globulin 4.0 08/18/2020 02:59 PM    A-G Ratio 1.3 03/09/2021 10:47 AM    ALT (SGPT) 20 07/03/2021 10:33 PM    AST (SGOT) 6 (L) 07/03/2021 10:33 PM     Lab Results   Component Value Date/Time    Cholesterol, total 158 03/09/2021 10:47 AM    Cholesterol (POC) 267 01/25/2013 12:05 PM    HDL Cholesterol 43 03/09/2021 10:47 AM    HDL Cholesterol (POC) 37 01/25/2013 12:05 PM    LDL Cholesterol (POC) 185 01/25/2013 12:05 PM    LDL, calculated 73 03/09/2021 10:47 AM    LDL, calculated 93.4 08/18/2020 02:59 PM    VLDL, calculated 42 (H) 03/09/2021 10:47 AM    VLDL, calculated 15.6 08/18/2020 02:59 PM    Triglyceride 262 (H) 03/09/2021 10:47 AM    Triglycerides (POC) 225 01/25/2013 12:05 PM    CHOL/HDL Ratio 3.0 08/18/2020 02:59 PM     Lab Results   Component Value Date/Time    Hemoglobin A1c 12.4 (H) 03/09/2021 10:47 AM    Hemoglobin A1c (POC) 8.5 05/04/2018 10:25 AM    Hemoglobin A1c, External 9.5 02/06/2020 12:00 AM     Lab Results   Component Value Date/Time    Vitamin D 25-Hydroxy 11.9 (L) 08/18/2020 03:02 PM       Lab Results   Component Value Date/Time    WBC 7.5 07/08/2021 08:40 PM    HGB 10.7 (L) 07/08/2021 08:40 PM    HCT 32.8 (L) 07/08/2021 08:40 PM    PLATELET 794 21/50/3521 08:40 PM    MCV 88.2 07/08/2021 08:40 PM     Wt Readings from Last 3 Encounters:   08/06/21 189 lb (85.7 kg)   07/08/21 187 lb 6.3 oz (85 kg)   06/30/21 184 lb (83.5 kg)     Temp Readings from Last 3 Encounters:   08/06/21 97.5 °F (36.4 °C) (Temporal)   07/08/21 99.4 °F (37.4 °C)   06/30/21 97.3 °F (36.3 °C) (Temporal)     BP Readings from Last 3 Encounters:   08/06/21 (!) 147/90   07/08/21 (!) 155/96   03/09/21 111/77     Pulse Readings from Last 3 Encounters:   08/06/21 77   07/08/21 81   06/30/21 (!) 103     Prior to Admission medications    Medication Sig Start Date End Date Taking? Authorizing Provider   busPIRone (BUSPAR) 5 mg tablet Take 1 Tablet by mouth two (2) times a day. 6/11/21  Yes Nicola Lunsford NP   metoclopramide HCl (REGLAN) 5 mg tablet TAKE 1 TABLET BY MOUTH FOUR TIMES DAILY BEFORE MEALS AND AT BEDTIME 3/26/21  Yes Provider, Historical   amLODIPine (NORVASC) 10 mg tablet Take 1 Tablet by mouth daily. 5/26/21  Yes Nicola Lunsford NP   pregabalin (LYRICA) 75 mg capsule Take 1 Capsule by mouth three (3) times daily. 5/26/21  Yes Nicola Lunsford NP   butalbital-acetaminophen-caffeine (FIORICET, ESGIC) -40 mg per tablet Take 1 Tablet by mouth every six (6) hours as needed for Headache. 5/26/21  Yes Nicola Lunsford NP   rosuvastatin (CRESTOR) 10 mg tablet Take 1 Tablet by mouth nightly. 5/26/21  Yes Taylor KENNY NP   oxybutynin (DITROPAN) 5 mg tablet Take 1 Tab by mouth daily. 5/3/21  Yes Nicola Lunsford NP   ondansetron (ZOFRAN ODT) 4 mg disintegrating tablet Take 1 Tab by mouth every eight (8) hours as needed for Nausea or Vomiting. 3/9/21  Yes Nicola Lunsford NP   Xarelto 15 mg tab tablet TAKE 1 TABLET BY MOUTH DAILY 1/4/21  Yes Zenon Soto NP   valACYclovir (VALTREX) 1 gram tablet Take 1 tab daily x5 days for outbreak 12/24/20  Yes Nicola Lunsford NP   cholecalciferol (Vitamin D3) (5000 Units/125 mcg) tab tablet Take 5,000 Units by mouth daily. Yes Provider, Historical   sodium bicarbonate 650 mg tablet take 1 tablet by mouth 2 times a day 11/3/20  Yes Nicola Lunsford NP   insulin syringe-needle U-100 1 mL 32 gauge x 5/16\" syrg 1 Syringe by Does Not Apply route as needed (to use with humlin).  8/16/20  Yes Nicola Lunsford NP   irbesartan (AVAPRO) 150 mg tablet TAKE 1 TABLET BY MOUTH DAILY FOR BLOOD PRESSURE 7/24/20  Yes Annabelle Cohen MD   FUROSEMIDE PO Take 20 mg by mouth daily as needed (edema). Yes Provider, Historical   metoprolol tartrate (LOPRESSOR) 50 mg tablet TAKE 1 TABLET BY MOUTH TWICE DAILY 6/1/20  Yes Franchesca ANDRADE NP   insulin pump (PATIENT SUPPLIED) misc by SubCUTAneous route as needed. Yes Provider, Historical   Blood-Glucose Meter monitoring kit by Does Not Apply route two (2) times a day. 7/9/10  Yes Leyda Salas MD   glucose blood VI test strips (ONE TOUCH ULTRA TEST) strip by Does Not Apply route. One touch ultra mini test strips 7/7/10  Yes Leyda Salas MD   insulin lispro (HumaLOG U-100 Insulin) 100 unit/mL injection Given via pump. Pump set every 90 days in provider's office. Patient not taking: Reported on 8/6/2021 6/11/21   Jose KENNY NP   simvastatin (ZOCOR) 40 mg tablet Take 40 mg by mouth nightly. Patient not taking: Reported on 6/30/2021    Provider, Historical     The following sections were reviewed & updated as appropriate: PMH, PSH, FH, and SH. Review of Systems   Constitutional: Negative for activity change, appetite change, chills, fatigue and fever. Respiratory: Negative for chest tightness and shortness of breath. Cardiovascular: Positive for leg swelling. Negative for chest pain and palpitations. Neurological: Negative for dizziness and headaches. Objective:     Visit Vitals  BP (!) 147/90 (BP 1 Location: Left arm, BP Patient Position: Sitting, BP Cuff Size: Adult)   Pulse 77   Temp 97.5 °F (36.4 °C) (Temporal)   Resp 16   Ht 5' 2\" (1.575 m)   Wt 189 lb (85.7 kg)   LMP 07/08/2021   SpO2 97%   BMI 34.57 kg/m²      Physical Exam  Constitutional:       General: She is not in acute distress. Appearance: She is well-developed. HENT:      Head: Normocephalic and atraumatic. Neck:      Vascular: No carotid bruit. Cardiovascular:      Rate and Rhythm: Normal rate and regular rhythm.       Heart sounds: Normal heart sounds. No murmur heard. No friction rub. No gallop. Pulmonary:      Effort: Pulmonary effort is normal.      Breath sounds: Normal breath sounds. No decreased breath sounds, wheezing, rhonchi or rales. Abdominal:      General: Bowel sounds are normal.      Palpations: Abdomen is soft. Tenderness: There is no abdominal tenderness. Musculoskeletal:      Cervical back: Normal range of motion and neck supple. Right lower le+ Edema present. Left lower le+ Edema present. Lymphadenopathy:      Cervical: No cervical adenopathy. Skin:     General: Skin is warm and dry. Neurological:      Mental Status: She is alert and oriented to person, place, and time. Disclaimer: The patient understands our medical plan. Alternatives have been explained and offered. The risks, benefits and significant side effects of all medications have been reviewed. Anticipated time course and progression of condition reviewed. All questions have been addressed. She is encouraged to employ the information provided in the after visit summary, which was reviewed. Where applicable, she is instructed to call the clinic if she has not been notified either by phone or through [address] with the results of her tests or with an appointment plan for any referrals within 1 week(s). No news is not good news; it's no news. The patient  is to call if her condition worsens or fails to improve or if significant side effects are experienced. Aspects of this note may have been generated using voice recognition software. Despite editing, there may be unrecognized errors.        Carlos Alberto Cummins NP

## 2021-08-18 DIAGNOSIS — Z79.4 TYPE 2 DIABETES MELLITUS WITH COMPLICATION, WITH LONG-TERM CURRENT USE OF INSULIN (HCC): Primary | ICD-10-CM

## 2021-08-18 DIAGNOSIS — E11.8 TYPE 2 DIABETES MELLITUS WITH COMPLICATION, WITH LONG-TERM CURRENT USE OF INSULIN (HCC): Primary | ICD-10-CM

## 2021-08-18 DIAGNOSIS — E78.00 PURE HYPERCHOLESTEROLEMIA: ICD-10-CM

## 2021-09-01 ENCOUNTER — VIRTUAL VISIT (OUTPATIENT)
Dept: FAMILY MEDICINE CLINIC | Age: 36
End: 2021-09-01
Payer: MEDICARE

## 2021-09-01 DIAGNOSIS — I10 ESSENTIAL HYPERTENSION: Primary | ICD-10-CM

## 2021-09-01 PROCEDURE — G8432 DEP SCR NOT DOC, RNG: HCPCS | Performed by: NURSE PRACTITIONER

## 2021-09-01 PROCEDURE — G8427 DOCREV CUR MEDS BY ELIG CLIN: HCPCS | Performed by: NURSE PRACTITIONER

## 2021-09-01 PROCEDURE — G8756 NO BP MEASURE DOC: HCPCS | Performed by: NURSE PRACTITIONER

## 2021-09-01 PROCEDURE — 99212 OFFICE O/P EST SF 10 MIN: CPT | Performed by: NURSE PRACTITIONER

## 2021-09-01 RX ORDER — LOPERAMIDE HYDROCHLORIDE 2 MG/1
CAPSULE ORAL
Qty: 30 CAPSULE | Refills: 0 | Status: SHIPPED | OUTPATIENT
Start: 2021-09-01 | End: 2021-10-18 | Stop reason: SDUPTHER

## 2021-09-01 NOTE — PROGRESS NOTES
Wolfgang Gonzales is a 43 y.o. female who was seen by synchronous (real-time) audio-video technology on 9/1/2021 for Hypertension    Assessment & Plan:     Diagnoses and all orders for this visit:    1. Essential hypertension    Other orders  -     loperamide (Imodium A-D) 2 mg capsule; 4 mg, followed by 2 mg after each loose stool; maximum: 16 mg/day      Follow-up and Dispositions    · Return in about 6 weeks (around 10/13/2021) for HTN, in office follow up. Routing History        I spent at least 15 minutes on this visit with this established patient. [id number]  Subjective:   Patient's blood pressure is better. Patient reports she not been checking her blood pressure as of late. Comments she has noticed when she gets upset she doesn't feel 'like she is going to have a stroke'. Reports the swelling in her legs has improved. Patient has not scheduled a follow up with Dr. David Sotelo as advised. Reports she thought since her swelling has improved she no longer needed to see him. Informed she must schedule follow up. Patient verbalizes understanding. Patient reports she has uncontrollable diarrhea when she is on her menstrual cycle. Requesting medication to help with loose stools. Prior to Admission medications    Medication Sig Start Date End Date Taking? Authorizing Provider   sodium bicarbonate 650 mg tablet take 1 tablet by mouth 2 times a day 8/6/21   Ester KENNY NP   metoprolol tartrate (LOPRESSOR) 100 mg IR tablet Take 1 Tablet by mouth two (2) times a day. 8/6/21   Ester KENNY NP   insulin lispro (HumaLOG U-100 Insulin) 100 unit/mL injection Given via pump. Pump set every 90 days in provider's office. Patient not taking: Reported on 8/6/2021 6/11/21   Ester KENNY NP   busPIRone (BUSPAR) 5 mg tablet Take 1 Tablet by mouth two (2) times a day. 6/11/21   Ester KENNY NP   simvastatin (ZOCOR) 40 mg tablet Take 40 mg by mouth nightly.   Patient not taking: Reported on 6/30/2021    Provider, Historical   metoclopramide HCl (REGLAN) 5 mg tablet TAKE 1 TABLET BY MOUTH FOUR TIMES DAILY BEFORE MEALS AND AT BEDTIME 3/26/21   Provider, Historical   amLODIPine (NORVASC) 10 mg tablet Take 1 Tablet by mouth daily. 5/26/21   Sheri KENNY NP   pregabalin (LYRICA) 75 mg capsule Take 1 Capsule by mouth three (3) times daily. 5/26/21   Sheri Davey KENNY NP   butalbital-acetaminophen-caffeine (FIORICET, ESGIC) -40 mg per tablet Take 1 Tablet by mouth every six (6) hours as needed for Headache. 5/26/21   Sheri KENNY NP   rosuvastatin (CRESTOR) 10 mg tablet Take 1 Tablet by mouth nightly. 5/26/21   Sheri Davey KENNY NP   oxybutynin (DITROPAN) 5 mg tablet Take 1 Tab by mouth daily. 5/3/21   Sheri KENNY NP   ondansetron (ZOFRAN ODT) 4 mg disintegrating tablet Take 1 Tab by mouth every eight (8) hours as needed for Nausea or Vomiting. 3/9/21   Sheri KENNY NP   Xarelto 15 mg tab tablet TAKE 1 TABLET BY MOUTH DAILY 1/4/21   Yana Soto NP   valACYclovir (VALTREX) 1 gram tablet Take 1 tab daily x5 days for outbreak 12/24/20   Sheri KENNY NP   cholecalciferol (Vitamin D3) (5000 Units/125 mcg) tab tablet Take 5,000 Units by mouth daily. Provider, Historical   insulin syringe-needle U-100 1 mL 32 gauge x 5/16\" syrg 1 Syringe by Does Not Apply route as needed (to use with humlin). 8/16/20   Sheri KENNY NP   irbesartan (AVAPRO) 150 mg tablet TAKE 1 TABLET BY MOUTH DAILY FOR BLOOD PRESSURE 7/24/20   Mahogany Orellana MD   FUROSEMIDE PO Take 20 mg by mouth daily as needed (edema). Provider, Historical   insulin pump (PATIENT SUPPLIED) misc by SubCUTAneous route as needed. Provider, Historical   Blood-Glucose Meter monitoring kit by Does Not Apply route two (2) times a day. 7/9/10   Savage Foreman MD   glucose blood VI test strips (ONE TOUCH ULTRA TEST) strip by Does Not Apply route.  One touch ultra mini test strips 7/7/10   [facility], MD     Patient Active Problem List Diagnosis Code    Neuropathy G62.9    Diabetes mellitus ([facility] E11.9    Eye problems H57.9    Toe pain M79.676    Hypercholesteremia E78.00    Diabetic gastroparesis associated with type 2 diabetes mellitus ([facility]) E11.43, K31.84    Hypovitaminosis D E55.9    Paroxysmal atrial fibrillation ([facility]) I48.0    Chronic anticoagulation Z79.01    Transient cerebral ischemia G45.9    Renal insufficiency N28.9    Bad odor of urine R82.90    Type 2 diabetes mellitus with stage 4 chronic kidney disease, with long-term current use of insulin ([facility]) E11.22, N18.4, Z79.4    Encephalopathy acute G93.40    CKD (chronic kidney disease) stage 3, GFR 30-59 ml/min ([facility]) N18.30    Essential hypertension I10    Chronic renal impairment, stage 4 (severe) ([facility]) N18.4    Acute hyperkalemia E87.5    Acute blood loss anemia D62    PAF (paroxysmal atrial fibrillation) ([facility]) I48.0    Abnormal vaginal bleeding N93.9    Acute kidney injury superimposed on chronic kidney disease ([facility]) N17.9, N18.9    Dehydration E86.0    Hyperglycemia R73.9    MICAELA (acute kidney injury) ([facility]) N17.9    Nausea vomiting and diarrhea R11.2, R19.7     Patient Active Problem List    Diagnosis Date Noted    Dehydration 07/04/2021    Hyperglycemia 07/04/2021    MICAELA (acute kidney injury) ([facility] 07/04/2021    Nausea vomiting and diarrhea 07/04/2021    Acute hyperkalemia 05/15/2020    Acute blood loss anemia 05/15/2020    PAF (paroxysmal atrial fibrillation) ([facility]) 05/15/2020    Abnormal vaginal bleeding 05/15/2020    Acute kidney injury superimposed on chronic kidney disease ([facility] 05/15/2020    CKD (chronic kidney disease) stage 3, GFR 30-59 ml/min ([facility])     Essential hypertension 12/11/2019    Chronic renal impairment, stage 4 (severe) ([facility] 12/11/2019    Encephalopathy acute 01/03/2019    Type 2 diabetes mellitus with stage 4 chronic kidney disease, with long-term current use of insulin ([facility] 08/14/2018    Bad odor of urine 10/26/2017    Paroxysmal atrial fibrillation (HCC) 04/08/2017    Chronic anticoagulation 04/08/2017    Transient cerebral ischemia 04/08/2017    Renal insufficiency 04/08/2017    Hypovitaminosis D 04/26/2012    Diabetic gastroparesis associated with type 2 diabetes mellitus ([address] 02/24/2012    Hypercholesteremia 06/21/2010    Neuropathy 05/20/2010    Diabetes mellitus ([address] 05/20/2010    Eye problems 05/20/2010    Toe pain 05/20/2010     Allergies   Allergen Reactions    Latex Hives    Contrast Agent [Iodine] Other (comments)     \"Burning with shooting pain\"    Macrobid [Nitrofurantoin Monohyd/M-Cryst] Diarrhea    Novolog Mix 70-30 [Insulin Asp Prt-Insulin Aspart] Nausea and Vomiting     Past Medical History:   Diagnosis Date    Cardiac echocardiogram 09/19/2016    [facility]:  Tech difficult. Pt refused 2nd attempt at bubble study. EF 60%. No WMA. Mild conc LVH. RVSP 20 mmHg. No atrial shunting by Doppler.  Cardiovascular lower extremity venous duplex 06/20/2016    No DVT bilaterally.  Carotid duplex 09/19/2016    Mild < 50% bilateral ICA stenosis.     Cerebral artery occlusion with cerebral infarction (HCC)     Chronic anticoagulation     Xarelto    CKD (chronic kidney disease) stage 3, GFR 30-59 ml/min ([facility])     Diabetes ([address]     Diabetic gastroparesis associated with type 2 diabetes mellitus ([address] 2/24/2012    Diabetic neuropathy ([address]     Diabetic retinopathy     Hypercholesterolemia     Hypertension     Hypovitaminosis D 4/26/2012    Currently on vitamin d therapy     PAF (paroxysmal atrial fibrillation) ([address]     Transient cerebral ischemia 4/8/2017    Type 2 diabetes mellitus with stage 4 chronic kidney disease, with long-term current use of insulin ([address] 8/14/2018     Past Surgical History:   Procedure Laterality Date    HX ORTHOPAEDIC  January 2013    right toe nail surgery Dr. Leal Carry     Family History   Problem Relation Age of Onset    Diabetes Mother     Cancer Paternal Grandmother      Social History     Tobacco Use    Smoking status: Never Smoker    Smokeless tobacco: Never Used   Substance Use Topics    Alcohol use: No       ROS    Objective:   No flowsheet data found. General: alert, cooperative, no distress   Mental  status: normal mood, behavior, speech, dress, motor activity, and thought processes, able to follow commands   HENT: NCAT   Neck: no visualized mass   Resp: no respiratory distress   Neuro: no gross deficits   Skin: no discoloration or lesions of concern on visible areas   Psychiatric: normal affect, consistent with stated mood, no evidence of hallucinations     Additional exam findings: We discussed the expected course, resolution and complications of the diagnosis(es) in detail. Medication risks, benefits, costs, interactions, and alternatives were discussed as indicated. I advised her to contact the office if her condition worsens, changes or fails to improve as anticipated. She expressed understanding with the diagnosis(es) and plan. Yumiko Woo, was evaluated through a synchronous (real-time) audio-video encounter. The patient (or guardian if applicable) is aware that this is a billable service. Verbal consent to proceed has been obtained within the past 12 months. The visit was conducted pursuant to the emergency declaration under the [facility], [address] [place] [address] authority and the [employer] Act. Patient identification was verified, and a caregiver was present when appropriate. The patient was located in a state where the provider was credentialed to provide care.     Libia Krishna NP

## 2021-10-18 ENCOUNTER — OFFICE VISIT (OUTPATIENT)
Dept: FAMILY MEDICINE CLINIC | Age: 36
End: 2021-10-18
Payer: MEDICARE

## 2021-10-18 VITALS
RESPIRATION RATE: 16 BRPM | BODY MASS INDEX: 34.08 KG/M2 | SYSTOLIC BLOOD PRESSURE: 170 MMHG | OXYGEN SATURATION: 99 % | DIASTOLIC BLOOD PRESSURE: 94 MMHG | WEIGHT: 185.2 LBS | HEIGHT: 62 IN | HEART RATE: 82 BPM | TEMPERATURE: 98.1 F

## 2021-10-18 DIAGNOSIS — I10 ESSENTIAL HYPERTENSION: Primary | ICD-10-CM

## 2021-10-18 DIAGNOSIS — Z23 ENCOUNTER FOR IMMUNIZATION: ICD-10-CM

## 2021-10-18 DIAGNOSIS — M79.89 PAIN AND SWELLING OF TOE OF RIGHT FOOT: ICD-10-CM

## 2021-10-18 DIAGNOSIS — M79.674 PAIN AND SWELLING OF TOE OF RIGHT FOOT: ICD-10-CM

## 2021-10-18 PROCEDURE — G8432 DEP SCR NOT DOC, RNG: HCPCS | Performed by: NURSE PRACTITIONER

## 2021-10-18 PROCEDURE — 99214 OFFICE O/P EST MOD 30 MIN: CPT | Performed by: NURSE PRACTITIONER

## 2021-10-18 PROCEDURE — G8417 CALC BMI ABV UP PARAM F/U: HCPCS | Performed by: NURSE PRACTITIONER

## 2021-10-18 PROCEDURE — 90686 IIV4 VACC NO PRSV 0.5 ML IM: CPT | Performed by: NURSE PRACTITIONER

## 2021-10-18 PROCEDURE — G8755 DIAS BP > OR = 90: HCPCS | Performed by: NURSE PRACTITIONER

## 2021-10-18 PROCEDURE — G0008 ADMIN INFLUENZA VIRUS VAC: HCPCS | Performed by: NURSE PRACTITIONER

## 2021-10-18 PROCEDURE — G8753 SYS BP > OR = 140: HCPCS | Performed by: NURSE PRACTITIONER

## 2021-10-18 PROCEDURE — G8427 DOCREV CUR MEDS BY ELIG CLIN: HCPCS | Performed by: NURSE PRACTITIONER

## 2021-10-18 RX ORDER — METOPROLOL TARTRATE 100 MG/1
100 TABLET ORAL 2 TIMES DAILY
Qty: 180 TABLET | Refills: 0 | Status: SHIPPED | OUTPATIENT
Start: 2021-10-18 | End: 2022-02-23 | Stop reason: ALTCHOICE

## 2021-10-18 RX ORDER — METRONIDAZOLE 7.5 MG/G
GEL TOPICAL 2 TIMES DAILY
Qty: 45 G | Refills: 1 | Status: SHIPPED | OUTPATIENT
Start: 2021-10-18 | End: 2021-10-23

## 2021-10-18 RX ORDER — HYDRALAZINE HYDROCHLORIDE 25 MG/1
25 TABLET, FILM COATED ORAL 2 TIMES DAILY
Qty: 180 TABLET | Refills: 1 | Status: SHIPPED | OUTPATIENT
Start: 2021-10-18 | End: 2021-12-24

## 2021-10-18 RX ORDER — GLUCAGON INJECTION, SOLUTION 1 MG/.2ML
INJECTION, SOLUTION SUBCUTANEOUS
COMMUNITY
Start: 2021-08-04 | End: 2022-02-02

## 2021-10-18 RX ORDER — FLUCONAZOLE 150 MG/1
150 TABLET ORAL
Qty: 2 TABLET | Refills: 3 | Status: SHIPPED | OUTPATIENT
Start: 2021-10-18 | End: 2022-02-23 | Stop reason: SDUPTHER

## 2021-10-18 RX ORDER — LOPERAMIDE HYDROCHLORIDE 2 MG/1
CAPSULE ORAL
Qty: 30 CAPSULE | Refills: 0 | Status: SHIPPED | OUTPATIENT
Start: 2021-10-18 | End: 2022-02-23 | Stop reason: SDUPTHER

## 2021-10-18 NOTE — PROGRESS NOTES
Chief Complaint   Patient presents with    Hypertension     7 wk f/u        Pt preferred language for health care discussion is english.     Is someone accompanying this pt? no    Is the patient using any DME equipment during [address]? no    Depression Screening:  3 most recent PHQ Screens 10/18/2021 9/1/2021 8/6/2021 6/30/2021 5/26/2021 3/9/2021 1/23/2020   PHQ Not Done Patient refuses - - - - - -   Little interest or pleasure in doing things Not at all Not at all Not at all Not at all Not at all Not at all Not at all   Feeling down, depressed, irritable, or hopeless Not at all Not at all Not at all Not at all Not at all Not at all Not at all   Total Score PHQ 2 0 0 0 0 0 0 0   Trouble falling or staying asleep, or sleeping too much - - - - - - Not at all   Feeling tired or having little energy - - - - - - Nearly every day   Poor appetite, weight loss, or overeating - - - - - - Not at all   Feeling bad about yourself - or that you are a failure or have let yourself or your family down - - - - - - Not at all   Trouble concentrating on things such as school, work, reading, or watching TV - - - - - - Not at all   Moving or speaking so slowly that other people could have noticed; or the opposite being so fidgety that others notice - - - - - - Not at all   Thoughts of being better off dead, or hurting yourself in some way - - - - - - Not at all   PHQ 9 Score - - - - - - 3   How difficult have these problems made it for you to do your work, take care of your home and get along with others - - - - - - Somewhat difficult       Learning Assessment:  Learning Assessment 1/23/2020 3/13/2015 12/31/2012   PRIMARY LEARNER Patient Patient Patient   HIGHEST LEVEL OF EDUCATION - PRIMARY LEARNER  2 YEARS OF COLLEGE 2 YEARS OF COLLEGE -   BARRIERS PRIMARY LEARNER NONE NONE -   CO-LEARNER CAREGIVER No No -   PRIMARY LANGUAGE ENGLISH ENGLISH ENGLISH   LEARNER PREFERENCE PRIMARY DEMONSTRATION DEMONSTRATION LISTENING     - - DEMONSTRATION ANSWERED BY self patient patient   RELATIONSHIP SELF SELF SELF         Health Maintenance reviewed and discussed per provider. Yes        Advance Directive:  1. Do you have an advance directive in place? Patient Reply:no    2. If not, would you like material regarding how to put one in place? Patient Reply: no    Coordination of Care:  1. Have you been to the ER, urgent care clinic since your last visit? Hospitalized since your last visit? no    2. Have you seen or consulted any other health care providers outside of the [address] [place] [address] since your last visit? Include any pap smears or colon screening. no      Patient received influenza vaccine 0.5ml in right deltoid. Tolerated well. No signs or symptoms of distress noted. Most current VIS given and consent signed.

## 2021-10-18 NOTE — PROGRESS NOTES
Timmy Wagoner is a 43 y.o. female who was seen in clinic today (10/18/2021) for Hypertension (7 wk f/u )    Assessment & Plan:   Diagnoses and all orders for this visit:    1. Essential hypertension    2. Pain and swelling of toe of right foot  -     URIC ACID; Future    3. Encounter for immunization  -     INFLUENZA VIRUS VAC QUAD,SPLIT,PRESV FREE SYRINGE IM    Other orders  -     loperamide (Imodium A-D) 2 mg capsule; 4 mg, followed by 2 mg after each loose stool; maximum: 16 mg/day  -     metoprolol tartrate (LOPRESSOR) 100 mg IR tablet; Take 1 Tablet by mouth two (2) times a day. -     hydrALAZINE (APRESOLINE) 25 mg tablet; Take 1 Tablet by mouth two (2) times a day. -     fluconazole (DIFLUCAN) 150 mg tablet; Take 1 Tablet by mouth every seven (7) days for 2 doses. -     metroNIDAZOLE (METROGEL) 0.75 % topical gel; Apply  to affected area two (2) times a day for 5 days. above not controlled, begin hydralazine as above and monitor blood pressure  Informed to schedule a follow up with cardiology and also keep scheduled appt with Dr. Patel(nephrologist)  I have discussed the diagnosis with the patient and the intended plan as seen in the above orders. The patient has received an after-visit summary and questions were answered concerning future plans. I have discussed medication side effects and warnings with the patient as well. Patient agreeable with above plan and verbalizes understanding. Follow-up and Dispositions    · Return in about 3 months (around 1/18/2022) for HTN, in office follow up. Subjective:   Hypertension ROS: taking medications as instructed, no medication side effects noted, patient has not been monitoring her blood pressure as of late. No TIA's, no chest pain on exertion, no dyspnea on exertion, no swelling of ankles.     Patient states she does have scheduled appt with her nephrologist on 11/1/2021  Patient requesting a refill on her metrogel and diflucan for vaginitis that occurs with her menstrual cycles. Also requesting a refill on imodium due to diarrhea associated with her menstrual cycles. Lab Results   Component Value Date/Time    Sodium 142 07/08/2021 05:17 AM    Potassium 3.8 07/08/2021 05:17 AM    Chloride 110 (H) 07/08/2021 05:17 AM    CO2 24 07/08/2021 05:17 AM    Anion gap 8 07/08/2021 05:17 AM    Glucose 100 07/08/2021 05:17 AM    BUN 57 (H) 07/08/2021 05:17 AM    Creatinine 6.0 (H) 07/08/2021 05:17 AM    BUN/Creatinine ratio 10 03/09/2021 10:47 AM    GFR est AA 10.0 07/08/2021 05:17 AM    GFR est non-AA 8 07/08/2021 05:17 AM    Calcium 7.7 (L) 07/08/2021 05:17 AM    Bilirubin, total 0.3 07/03/2021 10:33 PM    Alk.  phosphatase 112 07/03/2021 10:33 PM    Protein, total 8.7 (H) 07/03/2021 10:33 PM    Albumin 2.4 (L) 07/08/2021 05:17 AM    Globulin 4.0 08/18/2020 02:59 PM    A-G Ratio 1.3 03/09/2021 10:47 AM    ALT (SGPT) 20 07/03/2021 10:33 PM    AST (SGOT) 6 (L) 07/03/2021 10:33 PM     Lab Results   Component Value Date/Time    Cholesterol, total 158 03/09/2021 10:47 AM    Cholesterol (POC) 267 01/25/2013 12:05 PM    HDL Cholesterol 43 03/09/2021 10:47 AM    HDL Cholesterol (POC) 37 01/25/2013 12:05 PM    LDL Cholesterol (POC) 185 01/25/2013 12:05 PM    LDL, calculated 73 03/09/2021 10:47 AM    LDL, calculated 93.4 08/18/2020 02:59 PM    VLDL, calculated 42 (H) 03/09/2021 10:47 AM    VLDL, calculated 15.6 08/18/2020 02:59 PM    Triglyceride 262 (H) 03/09/2021 10:47 AM    Triglycerides (POC) 225 01/25/2013 12:05 PM    CHOL/HDL Ratio 3.0 08/18/2020 02:59 PM     Lab Results   Component Value Date/Time    Hemoglobin A1c 12.4 (H) 03/09/2021 10:47 AM    Hemoglobin A1c (POC) 8.5 05/04/2018 10:25 AM    Hemoglobin A1c, External 9.5 02/06/2020 12:00 AM     Lab Results   Component Value Date/Time    Vitamin D 25-Hydroxy 11.9 (L) 08/18/2020 03:02 PM       Lab Results   Component Value Date/Time    WBC 7.5 07/08/2021 08:40 PM    HGB 10.7 (L) 07/08/2021 08:40 PM    HCT 32.8 (L) 07/08/2021 08:40 PM    PLATELET 309 59/61/8348 08:40 PM    MCV 88.2 07/08/2021 08:40 PM     Wt Readings from Last 3 Encounters:   10/18/21 185 lb 3.2 oz (84 kg)   08/06/21 189 lb (85.7 kg)   07/08/21 187 lb 6.3 oz (85 kg)     Temp Readings from Last 3 Encounters:   10/18/21 98.1 °F (36.7 °C) (Temporal)   08/06/21 97.5 °F (36.4 °C) (Temporal)   07/08/21 99.4 °F (37.4 °C)     BP Readings from Last 3 Encounters:   10/18/21 (!) 180/105   08/06/21 (!) 147/90   07/08/21 (!) 155/96     Pulse Readings from Last 3 Encounters:   10/18/21 82   08/06/21 77   07/08/21 81     Prior to Admission medications    Medication Sig Start Date End Date Taking? Authorizing Provider   loperamide (Imodium A-D) 2 mg capsule 4 mg, followed by 2 mg after each loose stool; maximum: 16 mg/day 9/1/21  Yes Larraine Patrick NP   sodium bicarbonate 650 mg tablet take 1 tablet by mouth 2 times a day 8/6/21  Yes Villaraine Patrick NP   metoprolol tartrate (LOPRESSOR) 100 mg IR tablet Take 1 Tablet by mouth two (2) times a day. 8/6/21  Yes Larraine Patrick NP   insulin lispro (HumaLOG U-100 Insulin) 100 unit/mL injection Given via pump. Pump set every 90 days in provider's office. 6/11/21  Yes Larraine Patrick NP   busPIRone (BUSPAR) 5 mg tablet Take 1 Tablet by mouth two (2) times a day. 6/11/21  Yes Larraine Patrick NP   metoclopramide HCl (REGLAN) 5 mg tablet TAKE 1 TABLET BY MOUTH FOUR TIMES DAILY BEFORE MEALS AND AT BEDTIME 3/26/21  Yes Provider, Historical   amLODIPine (NORVASC) 10 mg tablet Take 1 Tablet by mouth daily. 5/26/21  Yes Larraine Patrick NP   pregabalin (LYRICA) 75 mg capsule Take 1 Capsule by mouth three (3) times daily. 5/26/21  Yes Larraine [place], NP   butalbital-acetaminophen-caffeine (FIORICET, ESGIC) -40 mg per tablet Take 1 Tablet by mouth every six (6) hours as needed for Headache. 5/26/21  Yes Larraine [place], NP   rosuvastatin (CRESTOR) 10 mg tablet Take 1 Tablet by mouth nightly.  5/26/21  Yes Larraine [place], NP   oxybutynin (DITROPAN) 5 mg tablet Take 1 Tab by mouth daily. 5/3/21  Yes Bonifacio Alas NP   ondansetron (ZOFRAN ODT) 4 mg disintegrating tablet Take 1 Tab by mouth every eight (8) hours as needed for Nausea or Vomiting. 3/9/21  Yes Bonifacio Alas NP   Xarelto 15 mg tab tablet TAKE 1 TABLET BY MOUTH DAILY 1/4/21  Yes Kalia Soto NP   valACYclovir (VALTREX) 1 gram tablet Take 1 tab daily x5 days for outbreak 12/24/20  Yes Bonifacio Alas NP   cholecalciferol (Vitamin D3) (5000 Units/125 mcg) tab tablet Take 5,000 Units by mouth daily. Yes Provider, Historical   insulin syringe-needle U-100 1 mL 32 gauge x 5/16\" syrg 1 Syringe by Does Not Apply route as needed (to use with humlin). 8/16/20  Yes Bonifacio Alas NP   irbesartan (AVAPRO) 150 mg tablet TAKE 1 TABLET BY MOUTH DAILY FOR BLOOD PRESSURE 7/24/20  Yes Billy Moon MD   FUROSEMIDE PO Take 20 mg by mouth daily as needed (edema). Yes Provider, Historical   insulin pump (PATIENT SUPPLIED) misc by SubCUTAneous route as needed. Yes Provider, Historical   glucose blood VI test strips (ONE TOUCH ULTRA TEST) strip by Does Not Apply route. One touch ultra mini test strips 7/7/10  Yes Mary Gonzalez MD     The following sections were reviewed & updated as appropriate: PMH, PSH, FH, and SH. Review of Systems   Constitutional: Negative for activity change, appetite change, chills, fatigue and fever. Respiratory: Negative for chest tightness and shortness of breath. Cardiovascular: Negative for chest pain. Neurological: Negative for dizziness and headaches. Objective:     Visit Vitals  BP (!) 170/94 (BP 1 Location: Right upper arm, BP Patient Position: Sitting, BP Cuff Size: Adult)   Pulse 82   Temp 98.1 °F (36.7 °C) (Temporal)   Resp 16   Ht 5' 2\" (1.575 m)   Wt 185 lb 3.2 oz (84 kg)   LMP 10/05/2021 (Exact Date)   SpO2 99%   BMI 33.87 kg/m²      Physical Exam  Constitutional:       General: She is not in acute distress. Appearance: She is well-developed. HENT:      Head: Normocephalic and atraumatic. Neck:      Vascular: No carotid bruit. Cardiovascular:      Rate and Rhythm: Normal rate and regular rhythm. Heart sounds: Normal heart sounds. No murmur heard. No friction rub. No gallop. Pulmonary:      Effort: Pulmonary effort is normal.      Breath sounds: Normal breath sounds. No decreased breath sounds, wheezing, rhonchi or rales. Abdominal:      General: Bowel sounds are normal.      Palpations: Abdomen is soft. Tenderness: There is no abdominal tenderness. Musculoskeletal:      Cervical back: Normal range of motion and neck supple. Lymphadenopathy:      Cervical: No cervical adenopathy. Skin:     General: Skin is warm and dry. Neurological:      Mental Status: She is alert and oriented to person, place, and time. Disclaimer: The patient understands our medical plan. Alternatives have been explained and offered. The risks, benefits and significant side effects of all medications have been reviewed. Anticipated time course and progression of condition reviewed. All questions have been addressed. She is encouraged to employ the information provided in the after visit summary, which was reviewed. Where applicable, she is instructed to call the clinic if she has not been notified either by phone or through [address] with the results of her tests or with an appointment plan for any referrals within 1 week(s). No news is not good news; it's no news. The patient  is to call if her condition worsens or fails to improve or if significant side effects are experienced. Aspects of this note may have been generated using voice recognition software. Despite editing, there may be unrecognized errors.        Ida Jarvis NP

## 2021-10-27 ENCOUNTER — HOSPITAL ENCOUNTER (OUTPATIENT)
Dept: LAB | Age: 36
Discharge: HOME | End: 2021-10-27
Payer: MEDICARE

## 2021-10-27 DIAGNOSIS — M79.89 PAIN AND SWELLING OF TOE OF RIGHT FOOT: ICD-10-CM

## 2021-10-27 DIAGNOSIS — M79.674 PAIN AND SWELLING OF TOE OF RIGHT FOOT: ICD-10-CM

## 2021-10-27 DIAGNOSIS — E78.00 PURE HYPERCHOLESTEROLEMIA: ICD-10-CM

## 2021-10-27 LAB
25(OH)D3 SERPL-MCNC: 15.6 NG/ML (ref 30–100)
ALBUMIN SERPL-MCNC: 3.2 G/DL (ref 3.4–5)
ANION GAP SERPL CALC-SCNC: 6 MMOL/L (ref 3–18)
BUN SERPL-MCNC: 74 MG/DL (ref 7–18)
BUN/CREAT SERPL: 9 (ref 12–20)
CALCIUM SERPL-MCNC: 8 MG/DL (ref 8.5–10.1)
CALCIUM SERPL-MCNC: 8.3 MG/DL (ref 8.5–10.1)
CHLORIDE SERPL-SCNC: 117 MMOL/L (ref 100–111)
CHOLEST SERPL-MCNC: 155 MG/DL
CO2 SERPL-SCNC: 18 MMOL/L (ref 21–32)
CREAT SERPL-MCNC: 8.21 MG/DL (ref 0.6–1.3)
CREAT UR-MCNC: 72 MG/DL (ref 30–125)
ERYTHROCYTE [DISTWIDTH] IN BLOOD BY AUTOMATED COUNT: 14.9 % (ref 11.6–14.5)
GLUCOSE SERPL-MCNC: 156 MG/DL (ref 74–99)
HCT VFR BLD AUTO: 23.5 % (ref 35–45)
HDLC SERPL-MCNC: 57 MG/DL (ref 40–60)
HDLC SERPL: 2.7 {RATIO} (ref 0–5)
HGB BLD-MCNC: 7.1 G/DL (ref 12–16)
LDLC SERPL CALC-MCNC: 76.8 MG/DL (ref 0–100)
LIPID PROFILE,FLP: NORMAL
MCH RBC QN AUTO: 27.7 PG (ref 24–34)
MCHC RBC AUTO-ENTMCNC: 30.2 G/DL (ref 31–37)
MCV RBC AUTO: 91.8 FL (ref 78–100)
PHOSPHATE SERPL-MCNC: 5 MG/DL (ref 2.5–4.9)
PLATELET # BLD AUTO: 234 K/UL (ref 135–420)
PMV BLD AUTO: 12 FL (ref 9.2–11.8)
POTASSIUM SERPL-SCNC: 6 MMOL/L (ref 3.5–5.5)
PROT UR-MCNC: 280 MG/DL
PTH-INTACT SERPL-MCNC: 1114.7 PG/ML (ref 18.4–88)
RBC # BLD AUTO: 2.56 M/UL (ref 4.2–5.3)
SODIUM SERPL-SCNC: 141 MMOL/L (ref 136–145)
TRIGL SERPL-MCNC: 106 MG/DL (ref ?–150)
URATE SERPL-MCNC: 7.2 MG/DL (ref 2.6–7.2)
VLDLC SERPL CALC-MCNC: 21.2 MG/DL
WBC # BLD AUTO: 7.4 K/UL (ref 4.6–13.2)

## 2021-10-27 PROCEDURE — 84156 ASSAY OF PROTEIN URINE: CPT

## 2021-10-27 PROCEDURE — 80061 LIPID PANEL: CPT

## 2021-10-27 PROCEDURE — 80069 RENAL FUNCTION PANEL: CPT

## 2021-10-27 PROCEDURE — 82570 ASSAY OF URINE CREATININE: CPT

## 2021-10-27 PROCEDURE — 84550 ASSAY OF BLOOD/URIC ACID: CPT

## 2021-10-27 PROCEDURE — 83970 ASSAY OF PARATHORMONE: CPT

## 2021-10-27 PROCEDURE — 85027 COMPLETE CBC AUTOMATED: CPT

## 2021-10-27 PROCEDURE — 82306 VITAMIN D 25 HYDROXY: CPT

## 2021-10-27 PROCEDURE — 36415 COLL VENOUS BLD VENIPUNCTURE: CPT

## 2021-11-18 RX ORDER — OXYBUTYNIN CHLORIDE 5 MG/1
TABLET ORAL
Qty: 90 TABLET | Refills: 1 | Status: SHIPPED | OUTPATIENT
Start: 2021-11-18

## 2021-12-19 PROBLEM — N18.5 CKD (CHRONIC KIDNEY DISEASE) STAGE 5, GFR LESS THAN 15 ML/MIN (HCC): Status: ACTIVE | Noted: 2021-12-19

## 2021-12-19 PROBLEM — R11.2 INTRACTABLE NAUSEA AND VOMITING: Status: ACTIVE | Noted: 2021-12-19

## 2021-12-19 PROBLEM — N28.9 ACUTE RENAL INSUFFICIENCY: Status: ACTIVE | Noted: 2021-12-19

## 2021-12-19 PROBLEM — R77.8 ELEVATED TROPONIN: Status: ACTIVE | Noted: 2021-12-19

## 2021-12-19 PROBLEM — I10 UNCONTROLLED HYPERTENSION: Status: ACTIVE | Noted: 2021-12-19

## 2021-12-19 PROBLEM — E11.9 DIABETES (HCC): Status: ACTIVE | Noted: 2021-12-19

## 2021-12-19 PROBLEM — N39.0 UTI (URINARY TRACT INFECTION): Status: ACTIVE | Noted: 2021-12-19

## 2021-12-25 PROBLEM — R11.10 VOMITING: Status: ACTIVE | Noted: 2021-12-25

## 2021-12-25 PROBLEM — N18.6 ESRD (END STAGE RENAL DISEASE) (HCC): Status: ACTIVE | Noted: 2021-12-25

## 2021-12-28 ENCOUNTER — PATIENT OUTREACH (OUTPATIENT)
Dept: CASE MANAGEMENT | Age: 36
End: 2021-12-28

## 2021-12-28 NOTE — PROGRESS NOTES
OMAIRA received hospital D/C notification today 12/28/21. Upon chart review, noted Patient is currently admitted to [address] [place] [address]. This episode is closed and resolved.

## 2021-12-29 ENCOUNTER — PATIENT OUTREACH (OUTPATIENT)
Dept: CASE MANAGEMENT | Age: 36
End: 2021-12-29

## 2021-12-29 NOTE — PROGRESS NOTES
Care Transitions Initial Call    Call within 2 business days of discharge: Yes     Patient: Chris Host Patient : 1985 MRN: [id number]    Last Discharge [address]       Complaint Diagnosis Description Type Department Provider    21 Lethargy ESRD (end stage renal disease) ([facility] [address] . .. ED to Hosp-Admission (Discharged) (ADMIT) Souleymane Duval MD; Nikole Koehler, ... Was this an external facility discharge? Yes, 2021 - 2021  Discharge Facility: [facility] . CTN Attempt to contact patient via telephone on 21 for post hospital  follow up. Unable to reach. Left message on voicemail with office contact information. No Patient medical information left on message. [facility] follow up appointment(s):   Future Appointments   Date Time Provider Brian Clifford   2022 10:40 AM Breanne Rain NP [facility] BS AMB

## 2021-12-30 ENCOUNTER — PATIENT OUTREACH (OUTPATIENT)
Dept: CASE MANAGEMENT | Age: 36
End: 2021-12-30

## 2021-12-30 NOTE — PROGRESS NOTES
Care Transitions Initial Call    Call within 2 business days of discharge: Yes     Patient: Carolyn Lo Patient : 1985 MRN: [id number]    Last Discharge [address]       Complaint Diagnosis Description Type Department Provider    21 Lethargy ESRD (end stage renal disease) ([facility] [address] . .. ED to Hosp-Admission (Discharged) (ADMIT) Mina Colon MD; Harrison Melgoza, ... Was this an external facility discharge? Yes, 2021 - 2021  Discharge Facility: [facility] . CTN Attempt to contact patient via telephone on 21 for post hospital  follow up. Unable to reach. Left message on voicemail with office contact information. No Patient medical information left on message. [facility] follow up appointment(s):   Future Appointments   Date Time Provider Brian Clifford   2022 10:40 AM Luciana Reveles NP [facility] BS AMB

## 2022-01-18 ENCOUNTER — VIRTUAL VISIT (OUTPATIENT)
Dept: FAMILY MEDICINE CLINIC | Age: 37
End: 2022-01-18
Payer: MEDICARE

## 2022-01-18 DIAGNOSIS — E04.9 ENLARGED THYROID: ICD-10-CM

## 2022-01-18 DIAGNOSIS — E11.8 TYPE 2 DIABETES MELLITUS WITH COMPLICATION, WITH LONG-TERM CURRENT USE OF INSULIN (HCC): ICD-10-CM

## 2022-01-18 DIAGNOSIS — Z87.898 HX OF NAUSEA AND VOMITING: ICD-10-CM

## 2022-01-18 DIAGNOSIS — N18.6 ESRD (END STAGE RENAL DISEASE) (HCC): Primary | ICD-10-CM

## 2022-01-18 DIAGNOSIS — Z09 HOSPITAL DISCHARGE FOLLOW-UP: ICD-10-CM

## 2022-01-18 DIAGNOSIS — I50.9 ACUTE ON CHRONIC CONGESTIVE HEART FAILURE, UNSPECIFIED HEART FAILURE TYPE (HCC): ICD-10-CM

## 2022-01-18 DIAGNOSIS — Z79.4 TYPE 2 DIABETES MELLITUS WITH COMPLICATION, WITH LONG-TERM CURRENT USE OF INSULIN (HCC): ICD-10-CM

## 2022-01-18 DIAGNOSIS — I48.0 PAROXYSMAL ATRIAL FIBRILLATION (HCC): ICD-10-CM

## 2022-01-18 DIAGNOSIS — E78.00 PURE HYPERCHOLESTEROLEMIA: ICD-10-CM

## 2022-01-18 DIAGNOSIS — I10 ESSENTIAL HYPERTENSION: ICD-10-CM

## 2022-01-18 PROCEDURE — G8432 DEP SCR NOT DOC, RNG: HCPCS | Performed by: NURSE PRACTITIONER

## 2022-01-18 PROCEDURE — G8427 DOCREV CUR MEDS BY ELIG CLIN: HCPCS | Performed by: NURSE PRACTITIONER

## 2022-01-18 PROCEDURE — 1111F DSCHRG MED/CURRENT MED MERGE: CPT | Performed by: NURSE PRACTITIONER

## 2022-01-18 PROCEDURE — 3046F HEMOGLOBIN A1C LEVEL >9.0%: CPT | Performed by: NURSE PRACTITIONER

## 2022-01-18 PROCEDURE — 99214 OFFICE O/P EST MOD 30 MIN: CPT | Performed by: NURSE PRACTITIONER

## 2022-01-18 PROCEDURE — G9231 DOC ESRD DIA TRANS PREG: HCPCS | Performed by: NURSE PRACTITIONER

## 2022-01-18 PROCEDURE — 2022F DILAT RTA XM EVC RTNOPTHY: CPT | Performed by: NURSE PRACTITIONER

## 2022-01-18 NOTE — PROGRESS NOTES
Mariela Melara is a 43 y.o. female who was seen by synchronous (real-time) audio-video technology on 1/18/2022 for No chief complaint on file. Assessment & Plan:   Diagnoses and all orders for this visit:    1. ESRD (end stage renal disease) ([facility]  Assessment & Plan:   monitored by specialist. No acute findings meriting change in the plan      2. Acute on chronic congestive heart failure, unspecified heart failure type [facility])  Assessment & Plan:   monitored by specialist. No acute findings meriting change in the plan      3. Type 2 diabetes mellitus with complication, with long-term current use of insulin ([facility])    4. Paroxysmal atrial fibrillation ([facility]  Assessment & Plan:   monitored by specialist. No acute findings meriting change in the plan      5. Hospital discharge follow-up    6. Hx of nausea and vomiting    7. Essential hypertension    8. Pure hypercholesterolemia    9. Enlarged thyroid    Other orders  -     Insulin Syringe-Needle, Dis Un 0.5 mL 30 gauge x 5/16\" syrg; To use with humalog 3 times daily        Follow-up and Dispositions    · Return in about 2 months (around 3/18/2022) for annual wellness exam, in office follow up. Routing History       I spent at least 30 minutes on this visit with this established patient. [id number]  Subjective:   Patient states she was recently started on dialysis during her hospitalization on 12/19/2021. She is currently on HD 4 days per week M-T-TH-F. Patient states she was seen by Dr. Medardo Juarez during her hospitalization, doesn't know when she has to follow up with Dr. Medardo Juarez. Reports she received a cardiac clearance to receive peritoneal dialysis catheter with Dr. Adriel Najera. Patient was advised to hold Gvoke HypoPen and InPen. States she is administering humalog 3 units-8 units after eating. States she has to call endocrinologist for further instructions on the sliding scale. She also states she needs a refill on insulin syringes.     Prior to Admission medications Medication Sig Start Date End Date Taking? Authorizing Provider   hydrALAZINE (APRESOLINE) 50 mg tablet Take 1 Tablet by mouth two (2) times a day. 12/24/21   Silvio Hanson MD   insulin lispro (HUMALOG) 100 unit/mL injection 4-12 Units by SubCUTAneous route Before breakfast, lunch, and dinner. 12/24/21   Silvio Hanson MD   apixaban (Eliquis) 5 mg tablet Take 1 Tablet by mouth two (2) times a day. 12/24/21   Silvio Hanson MD   b complex-vitamin c-folic acid (NEPHROCAPS) 1 mg capsule Take 1 Capsule by mouth daily. 12/25/21   Silvio Hanson MD   insulin glargine (Lantus U-100 Insulin) 100 unit/mL injection 16 Units by SubCUTAneous route nightly. 12/24/21   Silvio Hanson MD   oxybutynin (DITROPAN) 5 mg tablet TAKE 1 TABLET BY MOUTH DAILY 11/18/21   Talha KENNY NP   Gvoke HypoPen 2-Pack 1 mg/0.2 mL atIn  8/4/21   Amira Melo NP   Insulin Admin Supplies (InPen, for Humalog,) inpn by SubCUTAneous route. Amira Melo NP   loperamide (Imodium A-D) 2 mg capsule 4 mg, followed by 2 mg after each loose stool; maximum: 16 mg/day 10/18/21   Talha KENNY NP   metoprolol tartrate (LOPRESSOR) 100 mg IR tablet Take 1 Tablet by mouth two (2) times a day. 10/18/21   Talha KENNY NP   busPIRone (BUSPAR) 5 mg tablet Take 1 Tablet by mouth two (2) times a day. 6/11/21   Talha KENNY NP   metoclopramide HCl (REGLAN) 5 mg tablet TAKE 1 TABLET BY MOUTH FOUR TIMES DAILY BEFORE MEALS AND AT BEDTIME 3/26/21   Provider, Historical   amLODIPine (NORVASC) 10 mg tablet Take 1 Tablet by mouth daily. 5/26/21   Talha KENNY NP   pregabalin (LYRICA) 75 mg capsule Take 1 Capsule by mouth three (3) times daily. 5/26/21   Talha KENNY NP   butalbital-acetaminophen-caffeine (FIORICET, ESGIC) -40 mg per tablet Take 1 Tablet by mouth every six (6) hours as needed for Headache.   Patient not taking: Reported on 12/25/2021 5/26/21   Talha KENNY, NP   rosuvastatin (CRESTOR) 10 mg tablet Take 1 Tablet by mouth nightly. 5/26/21   Yasmany German KENNY NP   valACYclovir Ninfa Combe) 1 gram tablet Take 1 tab daily x5 days for outbreak 12/24/20   Yasmany German KENNY NP   insulin syringe-needle U-100 1 mL 32 gauge x 5/16\" syrg 1 Syringe by Does Not Apply route as needed (to use with humlin). 8/16/20   Yasmany German KENNY NP   glucose blood VI test strips (ONE TOUCH ULTRA TEST) strip by Does Not Apply route.  One touch ultra mini test strips 7/7/10   Marli Garsia MD     Patient Active Problem List   Diagnosis Code    Neuropathy G62.9    Diabetes mellitus ([facility] E11.9    Eye problems H57.9    Toe pain M79.676    Hypercholesteremia E78.00    Diabetic gastroparesis associated with type 2 diabetes mellitus ([facility] E11.43, K31.84    Hypovitaminosis D E55.9    Paroxysmal atrial fibrillation ([facility]) I48.0    Chronic anticoagulation Z79.01    Transient cerebral ischemia G45.9    Renal insufficiency N28.9    Bad odor of urine R82.90    Type 2 diabetes mellitus with stage 4 chronic kidney disease, with long-term current use of insulin ([facility]) E11.22, N18.4, Z79.4    Encephalopathy acute G93.40    CKD (chronic kidney disease) stage 3, GFR 30-59 ml/min ([facility]) N18.30    Essential hypertension I10    Chronic renal impairment, stage 4 (severe) ([facility]) N18.4    Acute hyperkalemia E87.5    Acute blood loss anemia D62    PAF (paroxysmal atrial fibrillation) ([facility]) I48.0    Abnormal vaginal bleeding N93.9    Acute kidney injury superimposed on chronic kidney disease ([facility]) N17.9, N18.9    Dehydration E86.0    Hyperglycemia R73.9    MICAELA (acute kidney injury) ([facility]) N17.9    Nausea vomiting and diarrhea R11.2, R19.7    UTI (urinary tract infection) N39.0    Acute renal insufficiency N28.9    Elevated troponin R77.8    Uncontrolled hypertension I10    Diabetes ([facility]) E11.9    CKD (chronic kidney disease) stage 5, GFR less than 15 ml/min ([facility]) N18.5    Intractable nausea and vomiting R11.2    Vomiting R11.10    ESRD (end stage renal disease) ([facility] [address] N18.6    Acute on chronic congestive heart failure, unspecified heart failure type ([address] I50.9     Patient Active Problem List    Diagnosis Date Noted    Acute on chronic congestive heart failure, unspecified heart failure type ([address] 01/18/2022    Vomiting 12/25/2021    ESRD (end stage renal disease) ([address] 12/25/2021    UTI (urinary tract infection) 12/19/2021    Acute renal insufficiency 12/19/2021    Elevated troponin 12/19/2021    Uncontrolled hypertension 12/19/2021    Diabetes ([address] 12/19/2021    CKD (chronic kidney disease) stage 5, GFR less than 15 ml/min ([facility]) 12/19/2021    Intractable nausea and vomiting 12/19/2021    Dehydration 07/04/2021    Hyperglycemia 07/04/2021    MICAELA (acute kidney injury) ([address] 07/04/2021    Nausea vomiting and diarrhea 07/04/2021    Acute hyperkalemia 05/15/2020    Acute blood loss anemia 05/15/2020    PAF (paroxysmal atrial fibrillation) ([address] 05/15/2020    Abnormal vaginal bleeding 05/15/2020    Acute kidney injury superimposed on chronic kidney disease ([address] 05/15/2020    CKD (chronic kidney disease) stage 3, GFR 30-59 ml/min ([facility])     Essential hypertension 12/11/2019    Chronic renal impairment, stage 4 (severe) ([address] 12/11/2019    Encephalopathy acute 01/03/2019    Type 2 diabetes mellitus with stage 4 chronic kidney disease, with long-term current use of insulin ([address] 08/14/2018    Bad odor of urine 10/26/2017    Paroxysmal atrial fibrillation ([address] 04/08/2017    Chronic anticoagulation 04/08/2017    Transient cerebral ischemia 04/08/2017    Renal insufficiency 04/08/2017    Hypovitaminosis D 04/26/2012    Diabetic gastroparesis associated with type 2 diabetes mellitus ([address] 02/24/2012    Hypercholesteremia 06/21/2010    Neuropathy 05/20/2010    Diabetes mellitus ([address] 05/20/2010    Eye problems 05/20/2010    Toe pain 05/20/2010     Current Outpatient Medications   Medication Sig Dispense Refill    Insulin Syringe-Needle, Dis Un 0.5 mL 30 gauge x 5/16\" syrg To use with humalog 3 times daily 100 Each 3    hydrALAZINE (APRESOLINE) 50 mg tablet Take 1 Tablet by mouth two (2) times a day. 60 Tablet 1    insulin lispro (HUMALOG) 100 unit/mL injection 4-12 Units by SubCUTAneous route Before breakfast, lunch, and dinner. 50 mL 0    apixaban (Eliquis) 5 mg tablet Take 1 Tablet by mouth two (2) times a day. 60 Tablet 1    insulin glargine (Lantus U-100 Insulin) 100 unit/mL injection 16 Units by SubCUTAneous route nightly. (Patient taking differently: 21 Units by SubCUTAneous route nightly.) 50 mL 0    oxybutynin (DITROPAN) 5 mg tablet TAKE 1 TABLET BY MOUTH DAILY 90 Tablet 1    loperamide (Imodium A-D) 2 mg capsule 4 mg, followed by 2 mg after each loose stool; maximum: 16 mg/day 30 Capsule 0    metoprolol tartrate (LOPRESSOR) 100 mg IR tablet Take 1 Tablet by mouth two (2) times a day. 180 Tablet 0    busPIRone (BUSPAR) 5 mg tablet Take 1 Tablet by mouth two (2) times a day. 180 Tablet 1    metoclopramide HCl (REGLAN) 5 mg tablet TAKE 1 TABLET BY MOUTH FOUR TIMES DAILY BEFORE MEALS AND AT BEDTIME      amLODIPine (NORVASC) 10 mg tablet Take 1 Tablet by mouth daily. 90 Tablet 1    pregabalin (LYRICA) 75 mg capsule Take 1 Capsule by mouth three (3) times daily. 90 Capsule 3    butalbital-acetaminophen-caffeine (FIORICET, ESGIC) -40 mg per tablet Take 1 Tablet by mouth every six (6) hours as needed for Headache. 40 Tablet 2    rosuvastatin (CRESTOR) 10 mg tablet Take 1 Tablet by mouth nightly. 90 Tablet 1    valACYclovir (VALTREX) 1 gram tablet Take 1 tab daily x5 days for outbreak 30 Tab 2    insulin syringe-needle U-100 1 mL 32 gauge x 5/16\" syrg 1 Syringe by Does Not Apply route as needed (to use with humlin). 100 Each 1    glucose blood VI test strips (ONE TOUCH ULTRA TEST) strip by Does Not Apply route.  One touch ultra mini test strips 1 Package 11    b complex-vitamin c-folic acid (NEPHROCAPS) 1 mg capsule Take 1 Capsule by mouth daily. (Patient not taking: Reported on 1/18/2022) 30 Capsule 1    Gvoke HypoPen 2-Pack 1 mg/0.2 mL atIn  (Patient not taking: Reported on 1/18/2022)      Insulin Admin Supplies (InPen, for Humalog,) inpn by SubCUTAneous route. (Patient not taking: Reported on 1/18/2022)       Allergies   Allergen Reactions    Latex Hives    Contrast Agent [Iodine] Other (comments)     \"Burning with shooting pain\"   Donzella Lobstein [Nitrofurantoin Monohyd/M-Cryst] Diarrhea    Novolog Mix 70-30 [Insulin Asp Prt-Insulin Aspart] Nausea and Vomiting     Past Medical History:   Diagnosis Date    Cardiac echocardiogram 09/19/2016    [facility]:  Tech difficult. Pt refused 2nd attempt at bubble study. EF 60%. No WMA. Mild conc LVH. RVSP 20 mmHg. No atrial shunting by Doppler.  Cardiovascular lower extremity venous duplex 06/20/2016    No DVT bilaterally.  Carotid duplex 09/19/2016    Mild < 50% bilateral ICA stenosis.     Cerebral artery occlusion with cerebral infarction (HCC)     Chronic anticoagulation     Xarelto    CKD (chronic kidney disease) stage 3, GFR 30-59 ml/min ([facility])     Diabetes ([address]     Diabetic gastroparesis associated with type 2 diabetes mellitus ([address] 2/24/2012    Diabetic neuropathy ([address]     Diabetic retinopathy     Hypercholesterolemia     Hypertension     Hypovitaminosis D 4/26/2012    Currently on vitamin d therapy     PAF (paroxysmal atrial fibrillation) ([address]     Transient cerebral ischemia 4/8/2017    Type 2 diabetes mellitus with stage 4 chronic kidney disease, with long-term current use of insulin ([address] 8/14/2018     Past Surgical History:   Procedure Laterality Date    HX ORTHOPAEDIC  January 2013    right toe nail surgery Dr. Misha Avitia     Family History   Problem Relation Age of Onset    Diabetes Mother     Cancer Paternal Grandmother      Social History     Tobacco Use    Smoking status: Never Smoker    Smokeless tobacco: Never Used   Substance Use Topics    Alcohol use: No       ROS    Objective:   No flowsheet data found. General: alert, cooperative, no distress   Mental  status: normal mood, behavior, speech, dress, motor activity, and thought processes, able to follow commands   HENT: NCAT   Neck: no visualized mass   Resp: no respiratory distress   Neuro: no gross deficits   Skin: no discoloration or lesions of concern on visible areas   Psychiatric: normal affect, consistent with stated mood, no evidence of hallucinations     Additional exam findings: We discussed the expected course, resolution and complications of the diagnosis(es) in detail. Medication risks, benefits, costs, interactions, and alternatives were discussed as indicated. I advised her to contact the office if her condition worsens, changes or fails to improve as anticipated. She expressed understanding with the diagnosis(es) and plan. Jeffyyannick Feliciano, was evaluated through a synchronous (real-time) audio-video encounter. The patient (or guardian if applicable) is aware that this is a billable service, which includes applicable co-pays. Verbal consent to proceed has been obtained. The visit was conducted pursuant to the emergency declaration under the [facility], [address] [place] [address] [facility] authority and the [employer] General Act. Patient identification was verified, and a caregiver was present when appropriate. The patient was located in a state where the provider was licensed to provide care.     Jayesh Russ NP

## 2022-01-21 ENCOUNTER — PATIENT OUTREACH (OUTPATIENT)
Dept: CASE MANAGEMENT | Age: 37
End: 2022-01-21

## 2022-01-21 NOTE — PROGRESS NOTES
Patient has graduated from the Transitions of Care Coordination  program on 1/21/22. CTN Attempt to contact patient via telephone on 1/21/22  for post hospital  follow up. Unable to reach. Left message on voicemail with office contact information. No Patient medical information left on message. No further care transitions nurse follow up scheduled. Noted Pt. Attended PCP apt on 1/18/22. Patient's upcoming visits:    Future Appointments   Date Time Provider Brian Clifford   3/18/2022  9:40 AM Donny Jordan NP [facility] BS AMB      I have been unable to reach patient on phone. This episode is closed and resolved.

## 2022-02-07 DIAGNOSIS — E11.8 TYPE 2 DIABETES MELLITUS WITH COMPLICATION, WITH LONG-TERM CURRENT USE OF INSULIN (HCC): ICD-10-CM

## 2022-02-07 DIAGNOSIS — Z79.4 TYPE 2 DIABETES MELLITUS WITH COMPLICATION, WITH LONG-TERM CURRENT USE OF INSULIN (HCC): ICD-10-CM

## 2022-02-11 NOTE — TELEPHONE ENCOUNTER
Last Visit: 1/18/22 with KEHINDE Marcial  Next Appointment: 2/23/22 with KEHINDE Marcial  Previous Refill Encounter(s): 5/26/21 #90 with 3 refills    Requested Prescriptions     Pending Prescriptions Disp Refills    pregabalin (LYRICA) 75 mg capsule [Pharmacy Med Name: PREGABALIN 75MG CAPSULES] 90 Capsule 3     Sig: TAKE 1 CAPSULE BY MOUTH THREE TIMES DAILY

## 2022-02-14 RX ORDER — PREGABALIN 75 MG/1
CAPSULE ORAL
Qty: 90 CAPSULE | Refills: 3 | Status: SHIPPED | OUTPATIENT
Start: 2022-02-14 | End: 2022-08-30

## 2022-02-17 ENCOUNTER — PATIENT OUTREACH (OUTPATIENT)
Dept: CASE MANAGEMENT | Age: 37
End: 2022-02-17

## 2022-02-18 ENCOUNTER — PATIENT OUTREACH (OUTPATIENT)
Dept: CASE MANAGEMENT | Age: 37
End: 2022-02-18

## 2022-02-23 ENCOUNTER — OFFICE VISIT (OUTPATIENT)
Dept: FAMILY MEDICINE CLINIC | Age: 37
End: 2022-02-23
Payer: MEDICARE

## 2022-02-23 VITALS
BODY MASS INDEX: 35.11 KG/M2 | DIASTOLIC BLOOD PRESSURE: 84 MMHG | RESPIRATION RATE: 18 BRPM | WEIGHT: 190.8 LBS | TEMPERATURE: 97.9 F | OXYGEN SATURATION: 98 % | HEART RATE: 80 BPM | HEIGHT: 62 IN | SYSTOLIC BLOOD PRESSURE: 148 MMHG

## 2022-02-23 DIAGNOSIS — Z00.00 MEDICARE ANNUAL WELLNESS VISIT, SUBSEQUENT: Primary | ICD-10-CM

## 2022-02-23 DIAGNOSIS — E78.00 PURE HYPERCHOLESTEROLEMIA: ICD-10-CM

## 2022-02-23 DIAGNOSIS — I10 ESSENTIAL HYPERTENSION: ICD-10-CM

## 2022-02-23 PROCEDURE — G0439 PPPS, SUBSEQ VISIT: HCPCS | Performed by: NURSE PRACTITIONER

## 2022-02-23 PROCEDURE — G8427 DOCREV CUR MEDS BY ELIG CLIN: HCPCS | Performed by: NURSE PRACTITIONER

## 2022-02-23 PROCEDURE — G9231 DOC ESRD DIA TRANS PREG: HCPCS | Performed by: NURSE PRACTITIONER

## 2022-02-23 PROCEDURE — G8432 DEP SCR NOT DOC, RNG: HCPCS | Performed by: NURSE PRACTITIONER

## 2022-02-23 PROCEDURE — G8417 CALC BMI ABV UP PARAM F/U: HCPCS | Performed by: NURSE PRACTITIONER

## 2022-02-23 RX ORDER — CALCITRIOL 0.25 UG/1
0.25 CAPSULE ORAL DAILY
COMMUNITY
Start: 2022-01-27

## 2022-02-23 RX ORDER — AMLODIPINE BESYLATE 10 MG/1
10 TABLET ORAL DAILY
Qty: 90 TABLET | Refills: 2 | Status: SHIPPED | OUTPATIENT
Start: 2022-02-23

## 2022-02-23 RX ORDER — LOPERAMIDE HYDROCHLORIDE 2 MG/1
CAPSULE ORAL
Qty: 30 CAPSULE | Refills: 2 | OUTPATIENT
Start: 2022-02-23 | End: 2022-06-03

## 2022-02-23 RX ORDER — CARVEDILOL 25 MG/1
25 TABLET ORAL 2 TIMES DAILY
COMMUNITY
Start: 2022-02-21

## 2022-02-23 RX ORDER — SEVELAMER CARBONATE 800 MG/1
TABLET, FILM COATED ORAL
COMMUNITY
Start: 2022-01-27

## 2022-02-23 RX ORDER — FLUCONAZOLE 150 MG/1
150 TABLET ORAL
Qty: 2 TABLET | Refills: 3 | Status: SHIPPED | OUTPATIENT
Start: 2022-02-23 | End: 2022-03-03

## 2022-02-23 NOTE — PROGRESS NOTES
Depression Screening:  3 most recent PHQ Screens 2/23/2022   PHQ Not Done -   Little interest or pleasure in doing things Not at all   Feeling down, depressed, irritable, or hopeless Not at all   Total Score PHQ 2 0   Trouble falling or staying asleep, or sleeping too much -   Feeling tired or having little energy -   Poor appetite, weight loss, or overeating -   Feeling bad about yourself - or that you are a failure or have let yourself or your family down -   Trouble concentrating on things such as school, work, reading, or watching TV -   Moving or speaking so slowly that other people could have noticed; or the opposite being so fidgety that others notice -   Thoughts of being better off dead, or hurting yourself in some way -   PHQ 9 Score -   How difficult have these problems made it for you to do your work, take care of your home and get along with others -       Learning Assessment:  Learning Assessment 1/23/2020   PRIMARY LEARNER Patient   HIGHEST LEVEL OF EDUCATION - PRIMARY LEARNER  2 YEARS OF COLLEGE   BARRIERS PRIMARY LEARNER NONE   CO-LEARNER CAREGIVER No   PRIMARY LANGUAGE ENGLISH   LEARNER PREFERENCE PRIMARY DEMONSTRATION     -   ANSWERED BY self   RELATIONSHIP SELF       Abuse Screening:  Abuse Screening Questionnaire 10/18/2021   Do you ever feel afraid of your partner? N   Are you in a relationship with someone who physically or mentally threatens you? N   Is it safe for you to go home? Y       Fall Risk  Fall Risk Assessment, last 12 mths 3/9/2021   Able to walk? Yes   Fall in past 12 months? 0   Do you feel unsteady? 0   Are you worried about falling 0       ADL  No flowsheet data found. Travel Screening:    Travel Screening     Question   Response    In the last month, have you been in contact with someone who was confirmed or suspected to have Coronavirus / COVID-19? No / Unsure    Have you had a COVID-19 viral test in the last 14 days?   No    Do you have any of the following new or worsening symptoms? None of these    Have you traveled internationally or domestically in the last month? No      Travel History   Travel since 01/23/22    No documented travel since 01/23/22         Health Maintenance reviewed and discussed and ordered per Provider. Health Maintenance Due   Topic Date Due    Pneumococcal 0-64 years (1 of 4 - PCV13) Never done    Foot Exam Q1  01/10/2020    MICROALBUMIN Q1  07/11/2020    Eye Exam Retinal or Dilated  08/14/2020    Cervical cancer screen  11/16/2020    COVID-19 Vaccine (3 - Booster for Moderna series) 10/04/2021    Medicare Yearly Exam  03/10/2022   . Mariela Melara presents today for   Chief Complaint   Patient presents with    Physical       Is someone accompanying this pt? no    Is the patient using any DME equipment during OV? no    Coordination of Care:  1. Have you been to the ER, urgent care clinic since your last visit? Hospitalized since your last visit? no    2. Have you seen or consulted any other health care providers outside of the [address] [place] [address] since your last visit? Include any pap smears or colon screening.  no

## 2022-02-23 NOTE — PROGRESS NOTES
This is the Subsequent Medicare Annual Wellness Exam, performed 12 months or more after the Initial AWV or the last Subsequent AWV    I have reviewed the patient's medical history in detail and updated the computerized patient record. Assessment/Plan   Education and counseling provided:  Are appropriate based on today's review and evaluation    1. Medicare annual wellness visit, subsequent  2. Essential hypertension  3. Pure hypercholesterolemia     I have discussed the diagnosis with the patient and the intended plan as seen in the above orders. The patient has received an after-visit summary and questions were answered concerning future plans. I have discussed medication side effects and warnings with the patient as well. Patient agreeable with above plan and verbalizes understanding. Follow-up and Dispositions    · Return in about 4 months (around 6/23/2022) for HTN, in office follow up.        Depression Risk Factor Screening     3 most recent PHQ Screens 2/23/2022   PHQ Not Done -   Little interest or pleasure in doing things Not at all   Feeling down, depressed, irritable, or hopeless Not at all   Total Score PHQ 2 0   Trouble falling or staying asleep, or sleeping too much -   Feeling tired or having little energy -   Poor appetite, weight loss, or overeating -   Feeling bad about yourself - or that you are a failure or have let yourself or your family down -   Trouble concentrating on things such as school, work, reading, or watching TV -   Moving or speaking so slowly that other people could have noticed; or the opposite being so fidgety that others notice -   Thoughts of being better off dead, or hurting yourself in some way -   PHQ 9 Score -   How difficult have these problems made it for you to do your work, take care of your home and get along with others -       Alcohol & Drug Abuse Risk Screen    Do you average more than 1 drink per night or more than 7 drinks a week:  No    On any one occasion in the past three months have you have had more than 3 drinks containing alcohol:  No          Functional Ability and Level of Safety    Hearing: Hearing is good. Activities of Daily Living: The home contains: no safety equipment. Patient does total self care      Ambulation: with no difficulty     Fall Risk:  Fall Risk Assessment, last 12 mths 3/9/2021   Able to walk? Yes   Fall in past 12 months? 0   Do you feel unsteady? 0   Are you worried about falling 0      Abuse Screen:  Patient is not abused     Cognitive Screening    Has your family/caregiver stated any concerns about your memory: no     Health Maintenance Due     Health Maintenance Due   Topic Date Due    Pneumococcal 0-64 years (1 of 4 - PCV13) Never done    Foot Exam Q1  01/10/2020    MICROALBUMIN Q1  07/11/2020    Eye Exam Retinal or Dilated  08/14/2020    Cervical cancer screen  11/16/2020    COVID-19 Vaccine (3 - Booster for Moderna series) 10/04/2021     Patient Care Team   Patient Care Team:  Mulu Sanon NP as PCP - General (Nurse Practitioner)  Mulu Sanon NP as PCP - [address] [place] [address]  [facility]  Eric James MD (Family Medicine)  Zeldaamber Brumfield MD (Inactive) (Cardiology)  Chad Serrano MD as Physician (Urology)  Louisa Bunn DO (Pulmonary Disease)    History   Patient states she did begin PD dialysis training. Reports she going to the center daily. Reports she was also started cardvilol yesterday for elevated blood pressure.     Patient Active Problem List   Diagnosis Code    Neuropathy G62.9    Diabetes mellitus ([facility] E11.9    Eye problems H57.9    Toe pain M79.676    Hypercholesteremia E78.00    Diabetic gastroparesis associated with type 2 diabetes mellitus ([facility] E11.43, K31.84    Hypovitaminosis D E55.9    Paroxysmal atrial fibrillation (HCC) I48.0    Chronic anticoagulation Z79.01    Transient cerebral ischemia G45.9    Renal insufficiency N28.9    Bad odor of urine R82.90    Type 2 diabetes mellitus with stage 4 chronic kidney disease, with long-term current use of insulin ([facility]) E11.22, N18.4, Z79.4    Encephalopathy acute G93.40    CKD (chronic kidney disease) stage 3, GFR 30-59 ml/min ([facility]) N18.30    Essential hypertension I10    Chronic renal impairment, stage 4 (severe) ([facility]) N18.4    Acute hyperkalemia E87.5    Acute blood loss anemia D62    PAF (paroxysmal atrial fibrillation) ([facility]) I48.0    Abnormal vaginal bleeding N93.9    Acute kidney injury superimposed on chronic kidney disease ([facility]) N17.9, N18.9    Dehydration E86.0    Hyperglycemia R73.9    MICAELA (acute kidney injury) ([facility]) N17.9    Nausea vomiting and diarrhea R11.2, R19.7    UTI (urinary tract infection) N39.0    Acute renal insufficiency N28.9    Elevated troponin R77.8    Uncontrolled hypertension I10    Diabetes ([facility]) E11.9    CKD (chronic kidney disease) stage 5, GFR less than 15 ml/min ([facility]) N18.5    Intractable nausea and vomiting R11.2    Vomiting R11.10    ESRD (end stage renal disease) ([facility]) N18.6    Acute on chronic congestive heart failure, unspecified heart failure type ([facility]) I50.9     Past Medical History:   Diagnosis Date    Cardiac echocardiogram 09/19/2016    [facility]:  Tech difficult. Pt refused 2nd attempt at bubble study. EF 60%. No WMA. Mild conc LVH. RVSP 20 mmHg. No atrial shunting by Doppler.  Cardiovascular lower extremity venous duplex 06/20/2016    No DVT bilaterally.  Carotid duplex 09/19/2016    Mild < 50% bilateral ICA stenosis.     Cerebral artery occlusion with cerebral infarction ([facility])     Chronic anticoagulation     Xarelto    Chronic kidney disease     CKD (chronic kidney disease) stage 3, GFR 30-59 ml/min ([facility])     Diabetes ([facility]     Diabetic gastroparesis associated with type 2 diabetes mellitus ([facility] 2/24/2012    Diabetic neuropathy ([facility])     Diabetic retinopathy     End stage renal disease ([facility]     Hypercholesterolemia     Hypertension     Hypovitaminosis D 4/26/2012    Currently on vitamin d therapy     PAF (paroxysmal atrial fibrillation) ([facility])     A. Fib    Transient cerebral ischemia 4/8/2017    Type 2 diabetes mellitus with stage 4 chronic kidney disease, with long-term current use of insulin ([facility] 8/14/2018      Past Surgical History:   Procedure Laterality Date    HX ORTHOPAEDIC  January 2013    right toe nail surgery Dr. Indu Friedman     Current Outpatient Medications   Medication Sig Dispense Refill    sevelamer carbonate (RENVELA) 800 mg tab tab TAKE 1 TABLET BY MOUTH THREE TIMES A DAY WITH MEALS      carvediloL (COREG) 25 mg tablet Take 25 mg by mouth two (2) times a day.  calcitRIOL (ROCALTROL) 0.25 mcg capsule Take 0.25 mcg by mouth daily.  pregabalin (LYRICA) 75 mg capsule TAKE 1 CAPSULE BY MOUTH THREE TIMES DAILY 90 Capsule 3    amLODIPine (NORVASC) 10 mg tablet Take 10 mg by mouth daily.  Omeprazole delayed release (PRILOSEC D/R) 20 mg tablet Take 20 mg by mouth daily.  hydrALAZINE (APRESOLINE) 50 mg tablet Take 1 Tablet by mouth two (2) times a day. (Patient taking differently: Take 25 mg by mouth two (2) times a day.) 60 Tablet 1    insulin lispro (HUMALOG) 100 unit/mL injection 4-12 Units by SubCUTAneous route Before breakfast, lunch, and dinner. (Patient taking differently: 4-12 Units by SubCUTAneous route Before breakfast, lunch, and dinner. Insulin PUMP) 50 mL 0    apixaban (Eliquis) 5 mg tablet Take 1 Tablet by mouth two (2) times a day. 60 Tablet 1    b complex-vitamin c-folic acid (NEPHROCAPS) 1 mg capsule Take 1 Capsule by mouth daily. 30 Capsule 1    oxybutynin (DITROPAN) 5 mg tablet TAKE 1 TABLET BY MOUTH DAILY 90 Tablet 1    metoprolol tartrate (LOPRESSOR) 100 mg IR tablet Take 1 Tablet by mouth two (2) times a day. 180 Tablet 0    busPIRone (BUSPAR) 5 mg tablet Take 1 Tablet by mouth two (2) times a day.  180 Tablet 1    metoclopramide HCl (REGLAN) 5 mg tablet TAKE 1 TABLET BY MOUTH FOUR TIMES DAILY BEFORE MEALS AND AT BEDTIME      butalbital-acetaminophen-caffeine (FIORICET, ESGIC) -40 mg per tablet Take 1 Tablet by mouth every six (6) hours as needed for Headache. 40 Tablet 2    rosuvastatin (CRESTOR) 10 mg tablet Take 1 Tablet by mouth nightly. 90 Tablet 1    valACYclovir (VALTREX) 1 gram tablet Take 1 tab daily x5 days for outbreak 30 Tab 2    glucose blood VI test strips (ONE TOUCH ULTRA TEST) strip by Does Not Apply route. One touch ultra mini test strips 1 Package 11    loperamide (Imodium A-D) 2 mg capsule 4 mg, followed by 2 mg after each loose stool; maximum: 16 mg/day (Patient not taking: Reported on 2/4/2022) 30 Capsule 0     Allergies   Allergen Reactions    Latex Hives    Contrast Agent [Iodine] Other (comments)     \"Burning with shooting pain\"    Macrobid [Nitrofurantoin Monohyd/M-Cryst] Diarrhea    Novolog Mix 70-30 [Insulin Asp Prt-Insulin Aspart] Nausea and Vomiting     Family History   Problem Relation Age of Onset    Diabetes Mother     Cancer Paternal Grandmother      Social History     Tobacco Use    Smoking status: Never Smoker    Smokeless tobacco: Never Used   Substance Use Topics    Alcohol use: No     Review of Systems   Constitutional: Negative for chills and fever. Respiratory: Negative for shortness of breath. Cardiovascular: Negative for chest pain and palpitations. Neurological: Negative for dizziness and headaches. BP (!) 148/84 (BP 1 Location: Right upper arm, BP Patient Position: Sitting)   Pulse 80   Temp 97.9 °F (36.6 °C) (Temporal)   Resp 18   Ht 5' 2\" (1.575 m)   Wt 190 lb 12.8 oz (86.5 kg)   LMP 01/31/2022   SpO2 98%   BMI 34.90 kg/m²     Physical Exam  Constitutional:       General: She is not in acute distress. Appearance: She is well-developed. HENT:      Head: Normocephalic and atraumatic. Neck:      Vascular: No carotid bruit. Cardiovascular:      Rate and Rhythm: Normal rate and regular rhythm.       Heart sounds: Normal heart sounds. No murmur heard. No friction rub. No gallop. Pulmonary:      Effort: Pulmonary effort is normal.      Breath sounds: Normal breath sounds. No decreased breath sounds, wheezing, rhonchi or rales. Abdominal:      General: Bowel sounds are normal.      Palpations: Abdomen is soft. Tenderness: There is no abdominal tenderness. Comments: PD catheter in place. Dressing C/D/I. Musculoskeletal:      Cervical back: Normal range of motion and neck supple. Lymphadenopathy:      Cervical: No cervical adenopathy. Skin:     General: Skin is warm and dry. Neurological:      Mental Status: She is alert and oriented to person, place, and time.        Gregory Sandoval NP [Follow-Up] : a follow-up evaluation of

## 2022-02-23 NOTE — ACP (ADVANCE CARE PLANNING)
Advance Care Planning     General Advance Care Planning (ACP) Conversation      Date of Conversation: 2/23/2022  Conducted with: Patient with Decision Making Capacity    Healthcare Decision Maker:     Primary Decision Maker: Alicia Gann - [facility] - [phone]  Click here to complete [place] including selection of the Healthcare Decision Maker Relationship (ie \"Primary\")      Content/Action Overview:   Has NO ACP documents/care preferences - information provided, considering goals and options  Reviewed DNR/DNI and patient elects Full Code (Attempt Resuscitation)    Length of Voluntary ACP Conversation in minutes:  <16 minutes (Non-Billable)    Yadira Daly, NP

## 2022-02-23 NOTE — PATIENT INSTRUCTIONS
Medicare Wellness Visit, Female     The best way to live healthy is to have a lifestyle where you eat a well-balanced diet, exercise regularly, limit alcohol use, and quit all forms of tobacco/nicotine, if applicable. Regular preventive services are another way to keep healthy. Preventive services (vaccines, screening tests, monitoring & exams) can help personalize your care plan, which helps you manage your own care. Screening tests can find health problems at the earliest stages, when they are easiest to treat. [facility] follows the current, evidence-based guidelines published by the [facility] ([facility]) when recommending preventive services for our patients. Because we follow these guidelines, sometimes recommendations change over time as research supports it. (For example, mammograms used to be recommended annually. Even though Medicare will still pay for an annual mammogram, the newer guidelines recommend a mammogram every two years for women of average risk). Of course, you and your doctor may decide to screen more often for some diseases, based on your risk and your co-morbidities (chronic disease you are already diagnosed with). Preventive services for you include:  - Medicare offers their members a free annual wellness visit, which is time for you and your primary care provider to discuss and plan for your preventive service needs. Take advantage of this benefit every year!  -All adults over the age of 72 should receive the recommended pneumonia vaccines. Current USPSTF guidelines recommend a series of two vaccines for the best pneumonia protection.   -All adults should have a flu vaccine yearly and a tetanus vaccine every 10 years.   -All adults age 48 and older should receive the shingles vaccines (series of two vaccines).       -All adults age 38-68 who are overweight should have a diabetes screening test once every three years.   -All adults born between [birth year] and 1965 should be screened once for Hepatitis C.  -Other screening tests and preventive services for persons with diabetes include: an eye exam to screen for diabetic retinopathy, a kidney function test, a foot exam, and stricter control over your cholesterol.   -Cardiovascular screening for adults with routine risk involves an electrocardiogram (ECG) at intervals determined by your doctor.   -Colorectal cancer screenings should be done for adults age 54-65 with no increased risk factors for colorectal cancer. There are a number of acceptable methods of screening for this type of cancer. Each test has its own benefits and drawbacks. Discuss with your doctor what is most appropriate for you during your annual wellness visit. The different tests include: colonoscopy (considered the best screening method), a fecal occult blood test, a fecal DNA test, and sigmoidoscopy.    -A bone mass density test is recommended when a woman turns 65 to screen for osteoporosis. This test is only recommended one time, as a screening. Some providers will use this same test as a disease monitoring tool if you already have osteoporosis. -Breast cancer screenings are recommended every other year for women of normal risk, age 54-69.  -Cervical cancer screenings for women over age 72 are only recommended with certain risk factors.      Here is a list of your current Health Maintenance items (your personalized list of preventive services) with a due date:  Health Maintenance Due   Topic Date Due    Pneumococcal Vaccine (1 of 4 - PCV13) Never done    Diabetic Foot Care  01/10/2020    Albumin Urine Test  07/11/2020    Eye Exam  08/14/2020    Cervical cancer screen  11/16/2020    COVID-19 Vaccine (3 - Booster for Moderna series) 10/04/2021

## 2022-02-25 ENCOUNTER — PATIENT OUTREACH (OUTPATIENT)
Dept: CASE MANAGEMENT | Age: 37
End: 2022-02-25

## 2022-03-04 ENCOUNTER — PATIENT OUTREACH (OUTPATIENT)
Dept: CASE MANAGEMENT | Age: 37
End: 2022-03-04

## 2022-03-10 ENCOUNTER — PATIENT OUTREACH (OUTPATIENT)
Dept: CASE MANAGEMENT | Age: 37
End: 2022-03-10

## 2022-03-10 NOTE — PROGRESS NOTES
Multiple attempts to contact patient for Transition of Care follow up. Messages with contact information provided. Will send letter and end episode. Called both patient and emergency contact.  VM left at both locations

## 2022-03-10 NOTE — LETTER
3/10/2022 3:27 PM    Ms. [id number] [address]      Dear Alfonzo James    My name is Melanie Whittaker and I am a Ambulatory Care Manager who partners with Ashley Gonzalez NP to improve patients' health. I've been trying to reach you via phone to let you know that, as a member of your care team, I will work with other providers involved in your care, offer education for your specific health conditions, and connect you with more resources as needed. This program is designed to provide you with the opportunity to have a ([id number] [facility]/[address]) care manager partner with you for the following situations:     1) if you come home from the hospital or emergency room   2) to help manage your disease   3) when you would like assistance coordinating services or appointments    This added support is provided at no additional cost to you. My primary focus is to help you achieve specific goals and improve your health. Please call me at [phone] to further discuss how I can support your health care needs.     Sincerely,        Melanie Whittaker RN, Ambulatory Care Manager   [phone]

## 2022-03-18 PROBLEM — I10 UNCONTROLLED HYPERTENSION: Status: ACTIVE | Noted: 2021-12-19

## 2022-03-18 PROBLEM — E87.5 ACUTE HYPERKALEMIA: Status: ACTIVE | Noted: 2020-05-15

## 2022-03-18 PROBLEM — R77.8 ELEVATED TROPONIN: Status: ACTIVE | Noted: 2021-12-19

## 2022-03-18 PROBLEM — N18.6 ESRD (END STAGE RENAL DISEASE) (HCC): Status: ACTIVE | Noted: 2021-12-25

## 2022-03-18 PROBLEM — I50.9 ACUTE ON CHRONIC CONGESTIVE HEART FAILURE, UNSPECIFIED HEART FAILURE TYPE (HCC): Status: ACTIVE | Noted: 2022-01-18

## 2022-03-18 PROBLEM — N17.9 AKI (ACUTE KIDNEY INJURY) (HCC): Status: ACTIVE | Noted: 2021-07-04

## 2022-03-18 PROBLEM — E86.0 DEHYDRATION: Status: ACTIVE | Noted: 2021-07-04

## 2022-03-18 PROBLEM — N18.9 ACUTE KIDNEY INJURY SUPERIMPOSED ON CHRONIC KIDNEY DISEASE (HCC): Status: ACTIVE | Noted: 2020-05-15

## 2022-03-18 PROBLEM — N28.9 ACUTE RENAL INSUFFICIENCY: Status: ACTIVE | Noted: 2021-12-19

## 2022-03-18 PROBLEM — N17.9 ACUTE KIDNEY INJURY SUPERIMPOSED ON CHRONIC KIDNEY DISEASE (HCC): Status: ACTIVE | Noted: 2020-05-15

## 2022-03-18 PROBLEM — N18.5 CKD (CHRONIC KIDNEY DISEASE) STAGE 5, GFR LESS THAN 15 ML/MIN (HCC): Status: ACTIVE | Noted: 2021-12-19

## 2022-03-18 PROBLEM — I48.0 PAROXYSMAL ATRIAL FIBRILLATION (HCC): Status: ACTIVE | Noted: 2017-04-08

## 2022-03-18 PROBLEM — R73.9 HYPERGLYCEMIA: Status: ACTIVE | Noted: 2021-07-04

## 2022-03-19 PROBLEM — N93.9 ABNORMAL VAGINAL BLEEDING: Status: ACTIVE | Noted: 2020-05-15

## 2022-03-19 PROBLEM — I48.0 PAF (PAROXYSMAL ATRIAL FIBRILLATION) (HCC): Status: ACTIVE | Noted: 2020-05-15

## 2022-03-19 PROBLEM — N18.4 CHRONIC RENAL IMPAIRMENT, STAGE 4 (SEVERE) (HCC): Status: ACTIVE | Noted: 2019-12-11

## 2022-03-19 PROBLEM — N28.9 RENAL INSUFFICIENCY: Status: ACTIVE | Noted: 2017-04-08

## 2022-03-19 PROBLEM — R11.2 INTRACTABLE NAUSEA AND VOMITING: Status: ACTIVE | Noted: 2021-12-19

## 2022-03-19 PROBLEM — G93.40 ENCEPHALOPATHY ACUTE: Status: ACTIVE | Noted: 2019-01-03

## 2022-03-19 PROBLEM — N18.4 TYPE 2 DIABETES MELLITUS WITH STAGE 4 CHRONIC KIDNEY DISEASE, WITH LONG-TERM CURRENT USE OF INSULIN (HCC): Status: ACTIVE | Noted: 2018-08-14

## 2022-03-19 PROBLEM — D62 ACUTE BLOOD LOSS ANEMIA: Status: ACTIVE | Noted: 2020-05-15

## 2022-03-19 PROBLEM — Z79.4 TYPE 2 DIABETES MELLITUS WITH STAGE 4 CHRONIC KIDNEY DISEASE, WITH LONG-TERM CURRENT USE OF INSULIN (HCC): Status: ACTIVE | Noted: 2018-08-14

## 2022-03-19 PROBLEM — E11.22 TYPE 2 DIABETES MELLITUS WITH STAGE 4 CHRONIC KIDNEY DISEASE, WITH LONG-TERM CURRENT USE OF INSULIN (HCC): Status: ACTIVE | Noted: 2018-08-14

## 2022-03-19 PROBLEM — R82.90 BAD ODOR OF URINE: Status: ACTIVE | Noted: 2017-10-26

## 2022-03-19 PROBLEM — R11.10 VOMITING: Status: ACTIVE | Noted: 2021-12-25

## 2022-03-19 PROBLEM — N39.0 UTI (URINARY TRACT INFECTION): Status: ACTIVE | Noted: 2021-12-19

## 2022-03-20 PROBLEM — Z79.01 CHRONIC ANTICOAGULATION: Status: ACTIVE | Noted: 2017-04-08

## 2022-03-20 PROBLEM — I10 ESSENTIAL HYPERTENSION: Status: ACTIVE | Noted: 2019-12-11

## 2022-03-20 PROBLEM — R19.7 NAUSEA VOMITING AND DIARRHEA: Status: ACTIVE | Noted: 2021-07-04

## 2022-03-20 PROBLEM — E11.9 DIABETES (HCC): Status: ACTIVE | Noted: 2021-12-19

## 2022-03-20 PROBLEM — G45.9 TRANSIENT CEREBRAL ISCHEMIA: Status: ACTIVE | Noted: 2017-04-08

## 2022-03-20 PROBLEM — R11.2 NAUSEA VOMITING AND DIARRHEA: Status: ACTIVE | Noted: 2021-07-04

## 2022-04-13 RX ORDER — ROSUVASTATIN CALCIUM 10 MG/1
TABLET, COATED ORAL
Qty: 90 TABLET | Refills: 1 | Status: SHIPPED | OUTPATIENT
Start: 2022-04-13

## 2022-04-22 NOTE — TELEPHONE ENCOUNTER
This was last given by a hospitalist. Please sign if appropriate. Last Visit: 2/23/22 with KEHINDE Saini  Next Appointment: 6/23/22 with KEHINDE Saini  Previous Refill Encounter(s): 12/24/21 #60 with 1 refill    Requested Prescriptions     Pending Prescriptions Disp Refills    apixaban (Eliquis) 5 mg tablet 60 Tablet 1     Sig: Take 1 Tablet by mouth two (2) times a day.

## 2022-06-03 PROBLEM — N28.9 RENAL INSUFFICIENCY: Status: RESOLVED | Noted: 2017-04-08 | Resolved: 2022-06-03

## 2022-06-03 PROBLEM — R82.90 BAD ODOR OF URINE: Status: RESOLVED | Noted: 2017-10-26 | Resolved: 2022-06-03

## 2022-06-03 PROBLEM — R82.90 BAD ODOR OF URINE: Status: RESOLVED | Noted: 2017-10-26 | Resolved: 2022-01-01

## 2022-06-03 PROBLEM — Z79.01 CHRONIC ANTICOAGULATION: Status: RESOLVED | Noted: 2017-04-08 | Resolved: 2022-06-03

## 2022-06-03 PROBLEM — E11.9 DIABETES (HCC): Status: RESOLVED | Noted: 2021-12-19 | Resolved: 2022-06-03

## 2022-06-03 PROBLEM — E11.65 UNCONTROLLED DIABETES MELLITUS WITH HYPERGLYCEMIA (HCC): Status: ACTIVE | Noted: 2022-06-03

## 2022-06-03 PROBLEM — K31.84 GASTROPARESIS: Status: ACTIVE | Noted: 2022-01-01

## 2022-06-03 PROBLEM — E87.5 ACUTE HYPERKALEMIA: Status: RESOLVED | Noted: 2020-05-15 | Resolved: 2022-01-01

## 2022-06-03 PROBLEM — N93.9 ABNORMAL VAGINAL BLEEDING: Status: RESOLVED | Noted: 2020-05-15 | Resolved: 2022-06-03

## 2022-06-03 PROBLEM — R11.2 NAUSEA VOMITING AND DIARRHEA: Status: RESOLVED | Noted: 2021-07-04 | Resolved: 2022-06-03

## 2022-06-03 PROBLEM — I50.9 ACUTE ON CHRONIC CONGESTIVE HEART FAILURE, UNSPECIFIED HEART FAILURE TYPE (HCC): Status: RESOLVED | Noted: 2022-01-18 | Resolved: 2022-06-03

## 2022-06-03 PROBLEM — N28.9 ACUTE RENAL INSUFFICIENCY: Status: RESOLVED | Noted: 2021-12-19 | Resolved: 2022-06-03

## 2022-06-03 PROBLEM — R00.0 TACHYCARDIA: Status: ACTIVE | Noted: 2022-06-03

## 2022-06-03 PROBLEM — N18.4 CHRONIC RENAL IMPAIRMENT, STAGE 4 (SEVERE) (HCC): Status: RESOLVED | Noted: 2019-12-11 | Resolved: 2022-06-03

## 2022-06-03 PROBLEM — R19.7 NAUSEA VOMITING AND DIARRHEA: Status: RESOLVED | Noted: 2021-07-04 | Resolved: 2022-01-01

## 2022-06-03 PROBLEM — N28.9 ACUTE RENAL INSUFFICIENCY: Status: RESOLVED | Noted: 2021-12-19 | Resolved: 2022-01-01

## 2022-06-03 PROBLEM — N18.5 CKD (CHRONIC KIDNEY DISEASE) STAGE 5, GFR LESS THAN 15 ML/MIN (HCC): Status: RESOLVED | Noted: 2021-12-19 | Resolved: 2022-06-03

## 2022-06-03 PROBLEM — R19.7 NAUSEA VOMITING AND DIARRHEA: Status: RESOLVED | Noted: 2021-07-04 | Resolved: 2022-06-03

## 2022-06-03 PROBLEM — K31.84 GASTROPARESIS: Status: ACTIVE | Noted: 2022-06-03

## 2022-06-03 PROBLEM — D62 ACUTE BLOOD LOSS ANEMIA: Status: RESOLVED | Noted: 2020-05-15 | Resolved: 2022-06-03

## 2022-06-03 PROBLEM — Z79.01 CHRONIC ANTICOAGULATION: Status: RESOLVED | Noted: 2017-04-08 | Resolved: 2022-01-01

## 2022-06-03 PROBLEM — R11.2 NAUSEA VOMITING AND DIARRHEA: Status: RESOLVED | Noted: 2021-07-04 | Resolved: 2022-01-01

## 2022-06-03 PROBLEM — E11.65 UNCONTROLLED DIABETES MELLITUS WITH HYPERGLYCEMIA (HCC): Status: ACTIVE | Noted: 2022-01-01

## 2022-06-03 PROBLEM — I50.9 ACUTE ON CHRONIC CONGESTIVE HEART FAILURE, UNSPECIFIED HEART FAILURE TYPE (HCC): Status: RESOLVED | Noted: 2022-01-18 | Resolved: 2022-01-01

## 2022-06-03 PROBLEM — N93.9 ABNORMAL VAGINAL BLEEDING: Status: RESOLVED | Noted: 2020-05-15 | Resolved: 2022-01-01

## 2022-06-03 PROBLEM — E87.5 ACUTE HYPERKALEMIA: Status: RESOLVED | Noted: 2020-05-15 | Resolved: 2022-06-03

## 2022-06-03 PROBLEM — N28.9 RENAL INSUFFICIENCY: Status: RESOLVED | Noted: 2017-04-08 | Resolved: 2022-01-01

## 2022-06-03 PROBLEM — D62 ACUTE BLOOD LOSS ANEMIA: Status: RESOLVED | Noted: 2020-05-15 | Resolved: 2022-01-01

## 2022-06-06 ENCOUNTER — PATIENT OUTREACH (OUTPATIENT)
Dept: CASE MANAGEMENT | Age: 37
End: 2022-06-06

## 2022-06-06 NOTE — PROGRESS NOTES
Care Transitions Initial Call    Call within 2 business days of discharge: Yes     Patient: Jessica Estrella Patient : 1985 MRN: [id number]    Last Discharge [address]       Complaint Diagnosis Description Type Department Provider    22 Abdominal Pain Gastroparesis . .. ED (DISCHARGE) Crissy Murrell MD; Di Pacheco... Was this an external facility discharge? Yes, 6/3/22   Discharge Facility:  [address] [place] [address] Transitions Nurse ( CTN) attempted to contact patient via telephone call. There was no response. A voicemail message was left requesting a non-emergency return telephone call. CTN  contact information provided. Request for PCP Transitions of Care appointment submitted.

## 2022-06-07 ENCOUNTER — PATIENT OUTREACH (OUTPATIENT)
Dept: CASE MANAGEMENT | Age: 37
End: 2022-06-07

## 2022-06-07 NOTE — PROGRESS NOTES
Care Transitions Initial Call    Call within 2 business days of discharge: Yes     Patient: [place] Patient : 1985 MRN: [id number]    Last Discharge [address]       Complaint Diagnosis Description Type Department Provider    22 Abdominal Pain Gastroparesis . .. ED (DISCHARGE) Neville Meneses MD; Carlos Colin... Was this an external facility discharge? Yes, 6/3/2022   Discharge Facility: [address] [place] [address] Transitions Nurse ( CTN) was unable to reach patient by phone call to the listed home and mobile phone number. A message was left on the mobile home voicemail systems requesting a non-emergency return telephone call. CTN contact information was provided. No medical information was left on the voicemail messages.

## 2022-06-10 ENCOUNTER — PATIENT OUTREACH (OUTPATIENT)
Dept: CASE MANAGEMENT | Age: 37
End: 2022-06-10

## 2022-06-10 NOTE — PROGRESS NOTES
Transitions of Care     Care Transitions Nurse ( CTN) was unable to reach patient by phone calls for follow up. Outreach letter sent via My Chart portal.     Episode resolved at this time.

## 2022-06-23 ENCOUNTER — VIRTUAL VISIT (OUTPATIENT)
Dept: FAMILY MEDICINE CLINIC | Age: 37
End: 2022-06-23
Payer: MEDICARE

## 2022-06-23 DIAGNOSIS — I10 ESSENTIAL HYPERTENSION: Primary | ICD-10-CM

## 2022-06-23 DIAGNOSIS — D63.1 ANEMIA DUE TO STAGE 5 CHRONIC KIDNEY DISEASE, NOT ON CHRONIC DIALYSIS (HCC): ICD-10-CM

## 2022-06-23 DIAGNOSIS — E78.00 PURE HYPERCHOLESTEROLEMIA: ICD-10-CM

## 2022-06-23 DIAGNOSIS — N18.5 ANEMIA DUE TO STAGE 5 CHRONIC KIDNEY DISEASE, NOT ON CHRONIC DIALYSIS (HCC): ICD-10-CM

## 2022-06-23 PROCEDURE — 1111F DSCHRG MED/CURRENT MED MERGE: CPT | Performed by: NURSE PRACTITIONER

## 2022-06-23 PROCEDURE — G8432 DEP SCR NOT DOC, RNG: HCPCS | Performed by: NURSE PRACTITIONER

## 2022-06-23 PROCEDURE — 99213 OFFICE O/P EST LOW 20 MIN: CPT | Performed by: NURSE PRACTITIONER

## 2022-06-23 PROCEDURE — G8427 DOCREV CUR MEDS BY ELIG CLIN: HCPCS | Performed by: NURSE PRACTITIONER

## 2022-06-23 PROCEDURE — G9231 DOC ESRD DIA TRANS PREG: HCPCS | Performed by: NURSE PRACTITIONER

## 2022-06-23 NOTE — PROGRESS NOTES
Griselda Nath is a 42 y.o. female who was seen by synchronous (real-time) audio-video technology on 6/23/2022 for No chief complaint on file. Assessment & Plan:   Diagnoses and all orders for this visit:    1. Essential hypertension    2. Anemia due to stage 5 chronic kidney disease, not on chronic dialysis ([facility] [address]  -     FERRITIN; Future  -     IRON PROFILE; Future  -     CBC WITH AUTOMATED DIFF; Future    3. Pure hypercholesterolemia      Follow-up and Dispositions    · Return in about 4 months (around 10/23/2022) for HTN/HLD, in office follow up. Routing History       I spent at least 20 minutes on this visit with this established patient. 712  Subjective:   Hypertension ROS: taking medications as instructed, no medication side effects noted, home BP monitoring in range of 426-275'T systolic over 86-87'Q diastolic, no TIA's, no chest pain on exertion, no dyspnea on exertion, no swelling of ankles. States when her systolic pressure is >481 she will take her hydralazine. Patient states she thinks she may need a blood transfusion. States she is in the heat she feels dizzy/wobbly. Reports she has tried drinking water and gatorade without improvement. Informed to speak with nephrologist regarding aforementioned symptoms and will order furthering testing today as well. Prior to Admission medications    Medication Sig Start Date End Date Taking? Authorizing Provider   omeprazole (PRILOSEC) 40 mg capsule Take 1 Capsule by mouth two (2) times a day. 6/3/22  Yes Jarret Banda MD   apixaban (Eliquis) 5 mg tablet Take 1 Tablet by mouth two (2) times a day. 4/22/22  Yes Kevin Sesay NP   rosuvastatin (CRESTOR) 10 mg tablet TAKE 1 TABLET BY MOUTH EVERY NIGHT 4/13/22  Yes Kevin Sesay NP   sevelamer carbonate (RENVELA) 800 mg tab tab TAKE 1 TABLET BY MOUTH THREE TIMES A DAY WITH MEALS 1/27/22  Yes Provider, Historical   carvediloL (COREG) 25 mg tablet Take 25 mg by mouth two (2) times a day.  2/21/22 Yes Provider, Historical   calcitRIOL (ROCALTROL) 0.25 mcg capsule Take 0.25 mcg by mouth daily. 1/27/22  Yes Provider, Historical   amLODIPine (NORVASC) 10 mg tablet Take 1 Tablet by mouth daily. 2/23/22  Yes Fidela Sandifer, NP   pregabalin (LYRICA) 75 mg capsule TAKE 1 CAPSULE BY MOUTH THREE TIMES DAILY 2/14/22  Yes Kassy KENNY NP   hydrALAZINE (APRESOLINE) 50 mg tablet Take 1 Tablet by mouth two (2) times a day. Patient taking differently: Take 25 mg by mouth two (2) times a day. 12/24/21  Yes Omer Palm MD   insulin lispro (HUMALOG) 100 unit/mL injection 4-12 Units by SubCUTAneous route Before breakfast, lunch, and dinner. Patient taking differently: 4-12 Units by SubCUTAneous route Before breakfast, lunch, and dinner. Insulin PUMP 12/24/21  Yes Omer Palm MD   b complex-vitamin c-folic acid (NEPHROCAPS) 1 mg capsule Take 1 Capsule by mouth daily. 12/25/21  Yes Omer Palm MD   oxybutynin (DITROPAN) 5 mg tablet TAKE 1 TABLET BY MOUTH DAILY 11/18/21  Yes Fidela Sandifer, NP   busPIRone (BUSPAR) 5 mg tablet Take 1 Tablet by mouth two (2) times a day. 6/11/21  Yes Fidela Sandifer, NP   metoclopramide HCl (REGLAN) 5 mg tablet TAKE 1 TABLET BY MOUTH FOUR TIMES DAILY BEFORE MEALS AND AT BEDTIME 3/26/21  Yes Provider, Historical   valACYclovir (VALTREX) 1 gram tablet Take 1 tab daily x5 days for outbreak 12/24/20  Yes Fidela Sandifer, NP   glucose blood VI test strips (ONE TOUCH ULTRA TEST) strip by Does Not Apply route.  One touch ultra mini test strips 7/7/10  Yes Sonya Francois MD   OTHER,NON-FORMULARY, Virt-caps  1 cap daily  Patient not taking: Reported on 6/23/2022    Provider, Historical     Patient Active Problem List   Diagnosis Code    Neuropathy G62.9    Eye problems H57.9    Hypercholesteremia E78.00    Diabetic gastroparesis associated with type 2 diabetes mellitus ([facility] [address] E11.43, K31.84    Hypovitaminosis D E55.9    Paroxysmal atrial fibrillation (HCC) I48.0    Transient cerebral ischemia G45.9    Type 2 diabetes mellitus with stage 4 chronic kidney disease, with long-term current use of insulin ([facility]) E11.22, N18.4, Z79.4    Encephalopathy acute G93.40    Essential hypertension I10    PAF (paroxysmal atrial fibrillation) ([facility]) I48.0    Acute kidney injury superimposed on chronic kidney disease ([facility]) N17.9, N18.9    Dehydration E86.0    Hyperglycemia R73.9    MICAELA (acute kidney injury) ([address] N17.9    UTI (urinary tract infection) N39.0    Elevated troponin R77.8    Uncontrolled hypertension I10    Intractable nausea and vomiting R11.2    Vomiting R11.10    ESRD (end stage renal disease) ([facility]) N18.6    Gastroparesis K31.84    Tachycardia R00.0    Uncontrolled diabetes mellitus with hyperglycemia ([facility]) E11.65     Patient Active Problem List    Diagnosis Date Noted    Gastroparesis 06/03/2022    Tachycardia 06/03/2022    Uncontrolled diabetes mellitus with hyperglycemia ([address] 06/03/2022    Vomiting 12/25/2021    ESRD (end stage renal disease) ([address] 12/25/2021    UTI (urinary tract infection) 12/19/2021    Elevated troponin 12/19/2021    Uncontrolled hypertension 12/19/2021    Intractable nausea and vomiting 12/19/2021    Dehydration 07/04/2021    Hyperglycemia 07/04/2021    MICAELA (acute kidney injury) ([address] 07/04/2021    PAF (paroxysmal atrial fibrillation) ([address] 05/15/2020    Acute kidney injury superimposed on chronic kidney disease ([address] 05/15/2020    Essential hypertension 12/11/2019    Encephalopathy acute 01/03/2019    Type 2 diabetes mellitus with stage 4 chronic kidney disease, with long-term current use of insulin ([address] 08/14/2018    Paroxysmal atrial fibrillation ([address] 04/08/2017    Transient cerebral ischemia 04/08/2017    Hypovitaminosis D 04/26/2012    Diabetic gastroparesis associated with type 2 diabetes mellitus ([address] 02/24/2012    Hypercholesteremia 06/21/2010    Neuropathy 05/20/2010    Eye problems 05/20/2010 Allergies   Allergen Reactions    Latex Hives    Contrast Agent [Iodine] Other (comments)     \"Burning with shooting pain\"    Macrobid [Nitrofurantoin Monohyd/M-Cryst] Diarrhea    Novolog Mix 70-30 [Insulin Asp Prt-Insulin Aspart] Nausea and Vomiting     Past Medical History:   Diagnosis Date    Cardiac echocardiogram 09/19/2016    [facility]:  Tech difficult. Pt refused 2nd attempt at bubble study. EF 60%. No WMA. Mild conc LVH. RVSP 20 mmHg. No atrial shunting by Doppler.  Cardiovascular lower extremity venous duplex 06/20/2016    No DVT bilaterally.  Carotid duplex 09/19/2016    Mild < 50% bilateral ICA stenosis.  Cerebral artery occlusion with cerebral infarction ([facility]     Chronic anticoagulation     Xarelto    Chronic kidney disease     CKD (chronic kidney disease) stage 3, GFR 30-59 ml/min ([facility])     Diabetes ([address]     Diabetic gastroparesis associated with type 2 diabetes mellitus ([address] 2/24/2012    Diabetic neuropathy ([address]     Diabetic retinopathy     End stage renal disease ([address]     Hypercholesterolemia     Hypertension     Hypovitaminosis D 4/26/2012    Currently on vitamin d therapy     PAF (paroxysmal atrial fibrillation) ([facility])     A. Fib    Transient cerebral ischemia 4/8/2017    Type 2 diabetes mellitus with stage 4 chronic kidney disease, with long-term current use of insulin ([address] 8/14/2018     Past Surgical History:   Procedure Laterality Date    HX ORTHOPAEDIC  January 2013    right toe nail surgery Dr. Tarsha Johnson     Family History   Problem Relation Age of Onset    Diabetes Mother     Cancer Paternal Grandmother      Social History     Tobacco Use    Smoking status: Never Smoker    Smokeless tobacco: Never Used   Substance Use Topics    Alcohol use: No     ROS    Objective:   No flowsheet data found.    General: alert, cooperative, no distress   Mental  status: normal mood, behavior, speech, dress, motor activity, and thought processes, able to follow commands HENT: NCAT   Neck: no visualized mass   Resp: no respiratory distress   Neuro: no gross deficits   Skin: no discoloration or lesions of concern on visible areas   Psychiatric: normal affect, consistent with stated mood, no evidence of hallucinations     Additional exam findings: We discussed the expected course, resolution and complications of the diagnosis(es) in detail. Medication risks, benefits, costs, interactions, and alternatives were discussed as indicated. I advised her to contact the office if her condition worsens, changes or fails to improve as anticipated. She expressed understanding with the diagnosis(es) and plan. Randi Foster, was evaluated through a synchronous (real-time) audio-video encounter. The patient (or guardian if applicable) is aware that this is a billable service, which includes applicable co-pays. This Virtual Visit was conducted with patient's (and/or legal guardian's) consent. The visit was conducted pursuant to the emergency declaration under the [facility], [address] [place] [address] authority and the [employer] and [employer] (?????)ar General Act. Patient identification was verified, and a caregiver was present when appropriate. The patient was located at: Home: [address] [place] [address] St  The provider was located at:  Facility (Appt Department): [address] [place] [address]  [address] [address] [address] KEHINDE Barkley

## 2022-06-23 NOTE — PROGRESS NOTES
Griselda Nath (: 1985) is a 42 y.o. female, established patient, here for evaluation of the following chief complaint(s):   No chief complaint on file. Griselda Nath, was evaluated through a synchronous (real-time) audio-video encounter. The patient (or guardian if applicable) is aware that this is a billable service, which includes applicable co-pays. This Virtual Visit was conducted with patient's (and/or legal guardian's) consent. The visit was conducted pursuant to the emergency declaration under the [address] [place] [address], [address] [place] [address] [facility] authority and the [employer] and [facility] General Act. Patient identification was verified, and a caregiver was present when appropriate. The patient was located at: Home: [address]  The provider was located at: Facility (Appt Department): [address]  [address]       An [address] [facility] was used to authenticate this note.   -- Graham Selby

## 2022-06-23 NOTE — PATIENT INSTRUCTIONS
[address] Page 1 of 1   Req/Control #: [id number] [employer] Unless Marked - [id number]    Collection D/T:   Client Felicia Andujar [ ] - Kerwin Fish By:    Self Pay [ ] - [id number]         Office/Provider Information    Account Name:    Client ID: [id number]          LCA ID: [id number]  [facility]                              Address:  [address] [place] [address]  Phone: [phone]  Fax: [phone] Provider:           Ayush Lomas NP     Provider NPI:   [id number]         Patient Information:   Name: Devin Matias   Gender: Female   SSN: [id number]   Patient ID: [id number]    YOB: 1985 (37 years)   Phone: [phone]   Address: [address] [place] [address]            CPT CODE TESTS ORDERED (Total: 1) PRIORITY ORDERED EXPECTED EXPIRES          [ ] BCF - Add Blood Collection Fee       99830 CBCXA CBC WITH AUTOMATED DIFF Routine 6/23/2022 6/23/2022 6/24/2023         Comments:         Specimen Type:   ([id number]) CBC WITH AUTOMATED DIFF:   Whole Blood            Diagnosis Codes:   N18.5 D63. 1               Bill Type:      Ins Code: Not on file               Responsible Party / Guarantor Information:   Name: Devin Matias   Address: [address] [place] [address] Zip: [place] [address]   Phone: [phone]   Relation to Pt: Self   Employer Name: NOT EMPLOYED         ABN:      Worker's Comp: N Date of Injury:              Insurance Information:   Primary Insurance: Secondary Insurance:   Ins Co Name: [address]   Address 1: [facility]   Address 2: [place], [place], [address]   Policy Number: [id number]   Group #: [id number]    Ins Co Name: [address]   Address 1:  [address] [address]   Address 2: [address] [place] [address] [facility]   Policy Number: [id number]   Group #: Elaine Estimable      Primary Policy Isisthi Leblanc / Insured: Secondary Policy Isis Leblanc / Solis Holman: Name: Syed Corral   Address: [address] [employer] [address] [place] [address], [address] [facility]   Pt Relation to Subscriber: Self    Name: Syed Corral   Address: [address], [address] [facility]   Pt Relation to Subscriber: Self                  Order Providers    Authorizing Provider Encounter Provider   KEHINDE Reinoso NP Sandria Mulders   1985 [id number]    Syed Corral   1985 [id number]    Ena Cochran [id number]    Alyssa Claw J   1985 [id number]      Alyssa Claw J   1985 [id number]    Alyssa Claw J   1985 [id number]    Ena Cochran [id number]    Ena Cochran [id number] 59 year old female with CHRISTIAN, GERD, ileus, presented with weakness of left arm for a few hours.  it has resolved.  she had lost 10 lb of weight in one week with increasing GERD and nausea.  She stopped glimepiride one month ago.  EGD was done 3 years ago.  no fever, chills, sob,                          11.1   4.2   )-----------( 251      ( 02 Jan 2018 12:50 )             36.8   01-02    135  |  100  |  22<H>  ----------------------------<  116<H>  3.9   |  27  |  0.67    Ca    8.5      02 Jan 2018 07:21    B12, folate and ferritn were normal  hep B and C neg< from: CT Chest w/ IV Cont (01.02.18 @ 12:04) >  INTERPRETATION:  CT of the chest, abdomen, and pelvis with IV contrast    Indication: Dysphasia. Iron deficiency anemia. Gastroesophageal reflux   disease. Weight loss.    Technique: Axial multidetector CT images of the chest, abdomen, and   pelvis are acquired following intravenous administration of 90 cc   Omnipaque-350 (10 cc discarded)    Comparison: Abdominal/pelvic CT dated 9/21/2013.    Findings:    Chest: No evidence for pleural or pericardial effusion. The heart is not   enlarged. No evidence for aortic dissection or aneurysm. A few small   nonspecific hypodense lesions in the thyroid bilaterally measuring up to   7 mm on the right.    Dilatation of the esophagus with accumulation of food particles and   fluid. Findings may represent achalasia or pseudoachalasia.  1.0 cm left paratracheal lymph node (image 19 series 2). No enlarged   hilar or axillary lymph node. The trachea and central bronchi appear   unremarkable. Scattered mild tree-in-bud nodular densities in both lungs   and patchy densities in the upper lobes bilaterally, suggestive of   aspiration pneumonia.    In the right lower lobe, there is a focal 1.6 cm density with stellate   borders (image 80 series 2); lung cancer cannot be excluded.    Abdomen: Small hypodense area in the left hepatic lobe adjacent to   falciform ligament, likely due to focal fatty infiltration. The   gallbladder, pancreas, spleen, and the right adrenal appear unremarkable.   Nonspecific mild left adrenal thickening. The common bile duct measures   up to 5 mm in caliber which is within normal limits. Hypodense lesions in   both kidneys with Hounsfield units measuring greater than 20, too high to   be simple cysts.    Evaluation of the bowel structures is limited by lack of oral contrast.     < end of copied text >  < from: CT Chest w/ IV Cont (01.02.18 @ 12:04) >  The appendix appears normal. No evidence for small bowel or colonic   obstruction. No grossly thickened bowel wall. No evidence for free air,   ascites, or enlarged lymph node.    Pelvis: The urinary bladder is collapsed which limits evaluation. The   urinary bladder appears grossly unremarkable. The uterus is not   visualized, probably surgically absent. The bowel structures are grossly   intact. No pelvic free fluid or enlarged lymph node.    Impression: Dilatation of the esophagus with accumulation of food   particles and fluid. Findings may represent achalasia or pseudoachalasia   from an obstructive cancer at the GE junction. If clinically indicated,   EGD may be pursued for further evaluation.    Borderline 1.0 cm left peritracheal lymph node.    Scattered mild tree-in-bud nodular densities in both lungs and patchy   densities in the upper lobes bilaterally, suggestive of aspiration   pneumonia.    In the right lower lobe, there is a focal 1.6 cm density with stellate   borders; lung cancer cannot be excluded.    < end of copied text >  < from: CT Chest w/ IV Cont (01.02.18 @ 12:04) >  Hypodense lesions in both kidneys with Hounsfield units measuring greater   than 20, too high to be simple cysts. If clinically indicated, renal   ultrasound may be pursued for further evaluation.    < end of copied text > 59 year old female with CHRISTIAN, GERD, ileus, presented with weakness of left arm for a few hours.  it has resolved.  she had lost 10 lb of weight in one week with increasing GERD and nausea.  She stopped glimepiride one month ago.  EGD was done 3 years ago.  no fever, chills, sob,  alert, not in distress. no palpable nodes at neck or axilla, chest is clear. abd is soft and flat. no leg edema.                          11.1   4.2   )-----------( 251      ( 02 Jan 2018 12:50 )             36.8   01-02    135  |  100  |  22<H>  ----------------------------<  116<H>  3.9   |  27  |  0.67    Ca    8.5      02 Jan 2018 07:21    B12, folate and ferritn were normal  hep B and C neg< from: CT Chest w/ IV Cont (01.02.18 @ 12:04) >  INTERPRETATION:  CT of the chest, abdomen, and pelvis with IV contrast    Indication: Dysphasia. Iron deficiency anemia. Gastroesophageal reflux   disease. Weight loss.    Technique: Axial multidetector CT images of the chest, abdomen, and   pelvis are acquired following intravenous administration of 90 cc   Omnipaque-350 (10 cc discarded)    Comparison: Abdominal/pelvic CT dated 9/21/2013.    Findings:    Chest: No evidence for pleural or pericardial effusion. The heart is not   enlarged. No evidence for aortic dissection or aneurysm. A few small   nonspecific hypodense lesions in the thyroid bilaterally measuring up to   7 mm on the right.    Dilatation of the esophagus with accumulation of food particles and   fluid. Findings may represent achalasia or pseudoachalasia.  1.0 cm left paratracheal lymph node (image 19 series 2). No enlarged   hilar or axillary lymph node. The trachea and central bronchi appear   unremarkable. Scattered mild tree-in-bud nodular densities in both lungs   and patchy densities in the upper lobes bilaterally, suggestive of   aspiration pneumonia.    In the right lower lobe, there is a focal 1.6 cm density with stellate   borders (image 80 series 2); lung cancer cannot be excluded.    Abdomen: Small hypodense area in the left hepatic lobe adjacent to   falciform ligament, likely due to focal fatty infiltration. The   gallbladder, pancreas, spleen, and the right adrenal appear unremarkable.   Nonspecific mild left adrenal thickening. The common bile duct measures   up to 5 mm in caliber which is within normal limits. Hypodense lesions in   both kidneys with Hounsfield units measuring greater than 20, too high to   be simple cysts.    Evaluation of the bowel structures is limited by lack of oral contrast.     < end of copied text >  < from: CT Chest w/ IV Cont (01.02.18 @ 12:04) >  The appendix appears normal. No evidence for small bowel or colonic   obstruction. No grossly thickened bowel wall. No evidence for free air,   ascites, or enlarged lymph node.    Pelvis: The urinary bladder is collapsed which limits evaluation. The   urinary bladder appears grossly unremarkable. The uterus is not   visualized, probably surgically absent. The bowel structures are grossly   intact. No pelvic free fluid or enlarged lymph node.    Impression: Dilatation of the esophagus with accumulation of food   particles and fluid. Findings may represent achalasia or pseudoachalasia   from an obstructive cancer at the GE junction. If clinically indicated,   EGD may be pursued for further evaluation.    Borderline 1.0 cm left peritracheal lymph node.    Scattered mild tree-in-bud nodular densities in both lungs and patchy   densities in the upper lobes bilaterally, suggestive of aspiration   pneumonia.    In the right lower lobe, there is a focal 1.6 cm density with stellate   borders; lung cancer cannot be excluded.    < end of copied text >  < from: CT Chest w/ IV Cont (01.02.18 @ 12:04) >  Hypodense lesions in both kidneys with Hounsfield units measuring greater   than 20, too high to be simple cysts. If clinically indicated, renal   ultrasound may be pursued for further evaluation.    < end of copied text >

## 2022-06-27 ENCOUNTER — HOSPITAL ENCOUNTER (OUTPATIENT)
Dept: LAB | Age: 37
Discharge: HOME | End: 2022-06-27
Payer: MEDICARE

## 2022-06-27 DIAGNOSIS — N18.5 ANEMIA DUE TO STAGE 5 CHRONIC KIDNEY DISEASE, NOT ON CHRONIC DIALYSIS (HCC): ICD-10-CM

## 2022-06-27 DIAGNOSIS — D63.1 ANEMIA DUE TO STAGE 5 CHRONIC KIDNEY DISEASE, NOT ON CHRONIC DIALYSIS (HCC): ICD-10-CM

## 2022-06-27 LAB
FERRITIN SERPL-MCNC: 787 NG/ML (ref 8–388)
IRON SATN MFR SERPL: 34 % (ref 20–50)
IRON SERPL-MCNC: 75 UG/DL (ref 50–175)
TIBC SERPL-MCNC: 222 UG/DL (ref 250–450)

## 2022-06-27 PROCEDURE — 83540 ASSAY OF IRON: CPT

## 2022-06-27 PROCEDURE — 82728 ASSAY OF FERRITIN: CPT

## 2022-06-27 PROCEDURE — 36415 COLL VENOUS BLD VENIPUNCTURE: CPT

## 2022-07-03 PROBLEM — E86.0 DEHYDRATION: Status: RESOLVED | Noted: 2021-07-04 | Resolved: 2022-01-01

## 2022-08-06 RX ORDER — BUTALBITAL, ACETAMINOPHEN AND CAFFEINE 50; 325; 40 MG/1; MG/1; MG/1
1 TABLET ORAL
Qty: 40 TABLET | Refills: 2 | Status: SHIPPED | OUTPATIENT
Start: 2022-08-06

## 2022-08-25 DIAGNOSIS — Z79.4 TYPE 2 DIABETES MELLITUS WITH COMPLICATION, WITH LONG-TERM CURRENT USE OF INSULIN (HCC): ICD-10-CM

## 2022-08-25 DIAGNOSIS — E11.8 TYPE 2 DIABETES MELLITUS WITH COMPLICATION, WITH LONG-TERM CURRENT USE OF INSULIN (HCC): ICD-10-CM

## 2022-08-25 NOTE — TELEPHONE ENCOUNTER
Pt left a voice message requesting refills. [phone]    Historical medication: Loperamide 2 mg     Last visit 06/23/2022 Virtual visit NP Christophe Adhikari   Next appointment 09/23/2022 NP Christophe Adhikari   Previous refill encounter(s)   02/14/2022 Lyrica #90 with 3 refills.      No access to [place] [id number] Only    Intervention Detail: New Rx: 2, reason: Patient Preference  Time Spent (min): 5      Requested Prescriptions     Pending Prescriptions Disp Refills    pregabalin (LYRICA) 75 mg capsule [Pharmacy Med Name: PREGABALIN 75MG CAPSULES] 90 Capsule      Sig: TAKE 1 CAPSULE BY MOUTH THREE TIMES DAILY    loperamide (IMODIUM) 2 mg capsule [Pharmacy Med Name: LOPERAMIDE 2MG CAPSULES] 30 Capsule 0     Sig: TAKE 2 CAPSULES BY MOUTH, FOLLOWED BY 1 CAPSULE AFTER EACH LOOSE STOOL MAX DAILY AMOUNT: 8 CAPSULES

## 2022-08-29 ENCOUNTER — PATIENT OUTREACH (OUTPATIENT)
Dept: CASE MANAGEMENT | Age: 37
End: 2022-08-29

## 2022-08-29 NOTE — PROGRESS NOTES
Attempted to contact patient for CCM encounter / follow up. No answer. Unable to leave HIPPA compliant message requesting a return call. VM Full or not set up as of yet. Will attempt to reach patient again.

## 2022-08-30 RX ORDER — PREGABALIN 75 MG/1
CAPSULE ORAL
Qty: 90 CAPSULE | Refills: 1 | Status: SHIPPED | OUTPATIENT
Start: 2022-08-30

## 2022-08-30 RX ORDER — LOPERAMIDE HYDROCHLORIDE 2 MG/1
CAPSULE ORAL
Qty: 30 CAPSULE | Refills: 0 | Status: SHIPPED | OUTPATIENT
Start: 2022-08-30

## 2022-09-07 ENCOUNTER — PATIENT OUTREACH (OUTPATIENT)
Dept: CASE MANAGEMENT | Age: 37
End: 2022-09-07

## 2022-09-12 ENCOUNTER — PATIENT OUTREACH (OUTPATIENT)
Dept: CASE MANAGEMENT | Age: 37
End: 2022-09-12

## 2022-09-16 ENCOUNTER — PATIENT OUTREACH (OUTPATIENT)
Dept: CASE MANAGEMENT | Age: 37
End: 2022-09-16

## 2022-09-21 ENCOUNTER — PATIENT OUTREACH (OUTPATIENT)
Dept: CASE MANAGEMENT | Age: 37
End: 2022-09-21

## 2022-09-23 ENCOUNTER — OFFICE VISIT (OUTPATIENT)
Dept: FAMILY MEDICINE CLINIC | Age: 37
End: 2022-09-23
Payer: MEDICARE

## 2022-09-23 VITALS
RESPIRATION RATE: 18 BRPM | OXYGEN SATURATION: 99 % | SYSTOLIC BLOOD PRESSURE: 144 MMHG | WEIGHT: 180.4 LBS | BODY MASS INDEX: 33.2 KG/M2 | TEMPERATURE: 97.9 F | HEART RATE: 81 BPM | HEIGHT: 62 IN | DIASTOLIC BLOOD PRESSURE: 88 MMHG

## 2022-09-23 DIAGNOSIS — I10 ESSENTIAL HYPERTENSION: Primary | ICD-10-CM

## 2022-09-23 DIAGNOSIS — E78.00 PURE HYPERCHOLESTEROLEMIA: ICD-10-CM

## 2022-09-23 DIAGNOSIS — Z23 ENCOUNTER FOR IMMUNIZATION: ICD-10-CM

## 2022-09-23 PROCEDURE — G8427 DOCREV CUR MEDS BY ELIG CLIN: HCPCS | Performed by: NURSE PRACTITIONER

## 2022-09-23 PROCEDURE — G0008 ADMIN INFLUENZA VIRUS VAC: HCPCS | Performed by: NURSE PRACTITIONER

## 2022-09-23 PROCEDURE — G9231 DOC ESRD DIA TRANS PREG: HCPCS | Performed by: NURSE PRACTITIONER

## 2022-09-23 PROCEDURE — G8417 CALC BMI ABV UP PARAM F/U: HCPCS | Performed by: NURSE PRACTITIONER

## 2022-09-23 PROCEDURE — 99213 OFFICE O/P EST LOW 20 MIN: CPT | Performed by: NURSE PRACTITIONER

## 2022-09-23 PROCEDURE — G8432 DEP SCR NOT DOC, RNG: HCPCS | Performed by: NURSE PRACTITIONER

## 2022-09-23 PROCEDURE — 90686 IIV4 VACC NO PRSV 0.5 ML IM: CPT | Performed by: NURSE PRACTITIONER

## 2022-09-23 RX ORDER — ONDANSETRON 4 MG/1
4 TABLET, FILM COATED ORAL
COMMUNITY

## 2022-09-23 RX ORDER — VALACYCLOVIR HYDROCHLORIDE 1 G/1
TABLET, FILM COATED ORAL
Qty: 30 TABLET | Refills: 2 | Status: SHIPPED | OUTPATIENT
Start: 2022-09-23

## 2022-09-23 RX ORDER — LOSARTAN POTASSIUM 100 MG/1
100 TABLET ORAL DAILY
COMMUNITY
Start: 2022-08-02

## 2022-09-23 RX ORDER — FLUCONAZOLE 150 MG/1
150 TABLET ORAL
Qty: 1 TABLET | Refills: 5 | Status: SHIPPED | OUTPATIENT
Start: 2022-09-23

## 2022-09-23 NOTE — PROGRESS NOTES
Earnestine Fu is a 42 y.o. female who was seen in clinic today (9/23/2022) for Cholesterol Problem and Hypertension    Assessment & Plan:   Diagnoses and all orders for this visit:    1. Essential hypertension    2. Pure hypercholesterolemia    3. Encounter for immunization  -     INFLUENZA, FLUARIX, FLULAVAL, FLUZONE (AGE 6 MO+), AFLURIA(AGE 3Y+) IM, PF, 0.5 ML    Other orders  -     valACYclovir (VALTREX) 1 gram tablet; Take 1 tab daily x5 days for outbreak  -     fluconazole (DIFLUCAN) 150 mg tablet; Take 1 Tablet by mouth every seven (7) days. Informed patient to speak with nephrologist and cardiologist regarding uncontrolled blood pressure given she is on dialysis. I have discussed the diagnosis with the patient and the intended plan as seen in the above orders. The patient has received an after-visit summary and questions were answered concerning future plans. I have discussed medication side effects and warnings with the patient as well. Patient agreeable with above plan and verbalizes understanding. Follow-up and Dispositions    Return in about 5 months (around 2/23/2023) for medicare wellness exam, in office follow up. Subjective:   Hypertension ROS: taking medications as instructed, no medication side effects noted, no TIA's, no chest pain on exertion, no dyspnea on exertion, no swelling of ankles. Patient states her blood pressure are still elevated. Comments she was recently taken off of hydralazine due to dizziness when she took her son the beach and started on losartan a month ago however, her blood pressure remains elevated. States she does have a follow up with nephrology today. Patient states she hasn't seen KEHINDE Villegas in [place]. States she hasn't been on the insulin pump. Instructed to call office to schedule follow up. Patient states she had a boil underneath her right breast.  Comments the area did burst and had a a foul odor but this has resolved.     Lab Results Component Value Date/Time    Sodium 133 (L) 06/03/2022 05:00 AM    Potassium 4.4 06/03/2022 05:00 AM    Chloride 95 (L) 06/03/2022 05:00 AM    CO2 21 06/03/2022 05:00 AM    Anion gap 18 (H) 06/03/2022 05:00 AM    Glucose 453 (HH) 06/03/2022 05:00 AM    BUN 53 (H) 06/03/2022 05:00 AM    Creatinine 14.48 (H) 06/03/2022 05:00 AM    BUN/Creatinine ratio 9 (L) 10/27/2021 02:31 PM    GFR est AA 4.0 06/03/2022 05:00 AM    GFR est non-AA 3 06/03/2022 05:00 AM    Calcium 9.5 06/03/2022 05:00 AM    Bilirubin, total 0.20 (L) 06/03/2022 05:00 AM    Alk.  phosphatase 107 06/03/2022 05:00 AM    Protein, total 7.8 06/03/2022 05:00 AM    Albumin 3.7 06/03/2022 05:00 AM    Globulin 4.0 08/18/2020 02:59 PM    A-G Ratio 1.3 03/09/2021 10:47 AM    ALT (SGPT) 10 06/03/2022 05:00 AM    AST (SGOT) <8.0 06/03/2022 05:00 AM     Lab Results   Component Value Date/Time    Cholesterol, total 187 12/20/2021 02:10 AM    Cholesterol (POC) 267 01/25/2013 12:05 PM    HDL Cholesterol 44 12/20/2021 02:10 AM    HDL Cholesterol (POC) 37 01/25/2013 12:05 PM    LDL Cholesterol (POC) 185 01/25/2013 12:05 PM    LDL, calculated 110 12/20/2021 02:10 AM    VLDL, calculated 21.2 10/27/2021 02:04 PM    Triglyceride 165 (H) 12/20/2021 02:10 AM    Triglycerides (POC) 225 01/25/2013 12:05 PM    CHOL/HDL Ratio 4.3 12/20/2021 02:10 AM     Lab Results   Component Value Date/Time    Hemoglobin A1c 8.4 (H) 12/19/2021 07:43 PM    Hemoglobin A1c (POC) 8.5 05/04/2018 10:25 AM    Hemoglobin A1c, External 9.5 02/06/2020 12:00 AM     Lab Results   Component Value Date/Time    Vitamin D 25-Hydroxy 15.6 (L) 10/27/2021 02:32 PM       Lab Results   Component Value Date/Time    WBC 7.9 06/03/2022 05:00 AM    HGB 10.8 (L) 06/03/2022 05:00 AM    HCT 35.6 (L) 06/03/2022 05:00 AM    PLATELET 932 76/67/8975 05:00 AM    MCV 87.0 06/03/2022 05:00 AM       Wt Readings from Last 3 Encounters:   09/23/22 180 lb 6.4 oz (81.8 kg)   06/02/22 170 lb (77.1 kg)   02/23/22 190 lb 12.8 oz (86.5 kg)     Temp Readings from Last 3 Encounters:   09/23/22 97.9 °F (36.6 °C) (Temporal)   06/03/22 98.7 °F (37.1 °C)   02/23/22 97.9 °F (36.6 °C) (Temporal)     BP Readings from Last 3 Encounters:   09/23/22 (!) 144/88   06/03/22 (!) 144/78   02/23/22 (!) 148/84     Pulse Readings from Last 3 Encounters:   09/23/22 81   06/03/22 91   02/23/22 80       Prior to Admission medications    Medication Sig Start Date End Date Taking? Authorizing Provider   pregabalin (LYRICA) 75 mg capsule TAKE 1 CAPSULE BY MOUTH THREE TIMES DAILY 8/30/22  Yes Amadou Stark NP   loperamide (IMODIUM) 2 mg capsule TAKE 2 CAPSULES BY MOUTH, FOLLOWED BY 1 CAPSULE AFTER EACH LOOSE STOOL MAX DAILY AMOUNT: 8 CAPSULES 8/30/22  Yes Amadou Stark NP   butalbital-acetaminophen-caffeine (FIORICET, ESGIC) -40 mg per tablet Take 1 Tablet by mouth every six (6) hours as needed for Headache. 8/6/22  Yes Navin KENNY NP   omeprazole (PRILOSEC) 40 mg capsule Take 1 Capsule by mouth two (2) times a day. 6/3/22  Yes Andrew Banda MD   apixaban (Eliquis) 5 mg tablet Take 1 Tablet by mouth two (2) times a day. 4/22/22  Yes Amadou Stark NP   rosuvastatin (CRESTOR) 10 mg tablet TAKE 1 TABLET BY MOUTH EVERY NIGHT 4/13/22  Yes Navin KENNY NP   carvediloL (COREG) 25 mg tablet Take 25 mg by mouth two (2) times a day. 2/21/22  Yes Provider, Historical   calcitRIOL (ROCALTROL) 0.25 mcg capsule Take 0.25 mcg by mouth daily. 1/27/22  Yes Provider, Historical   amLODIPine (NORVASC) 10 mg tablet Take 1 Tablet by mouth daily. 2/23/22  Yes Amadou Stark NP   insulin lispro (HUMALOG) 100 unit/mL injection 4-12 Units by SubCUTAneous route Before breakfast, lunch, and dinner. Patient taking differently: 4-12 Units by SubCUTAneous route Before breakfast, lunch, and dinner. Insulin PUMP 12/24/21  Yes Dionne Jeronimo MD   b complex-vitamin c-folic acid (NEPHROCAPS) 1 mg capsule Take 1 Capsule by mouth daily.  12/25/21  Yes Dionne Jeronimo MD oxybutynin (DITROPAN) 5 mg tablet TAKE 1 TABLET BY MOUTH DAILY 11/18/21  Yes Nico King NP   busPIRone (BUSPAR) 5 mg tablet Take 1 Tablet by mouth two (2) times a day. 6/11/21  Yes Nico King NP   valACYclovir (VALTREX) 1 gram tablet Take 1 tab daily x5 days for outbreak 12/24/20  Yes Nico King NP   glucose blood VI test strips (ONE TOUCH ULTRA TEST) strip by Does Not Apply route. One touch ultra mini test strips 7/7/10  Yes Bhupendra Trinidad MD   sevelamer carbonate (RENVELA) 800 mg tab tab TAKE 1 TABLET BY MOUTH THREE TIMES A DAY WITH MEALS  Patient not taking: Reported on 9/23/2022 1/27/22   Provider, Historical   OTHER,NON-FORMULARY, Virt-caps  1 cap daily  Patient not taking: No sig reported    Provider, Historical   hydrALAZINE (APRESOLINE) 50 mg tablet Take 1 Tablet by mouth two (2) times a day. Patient not taking: Reported on 9/23/2022 12/24/21   Roxanne Forde MD   metoclopramide HCl (REGLAN) 5 mg tablet TAKE 1 TABLET BY MOUTH FOUR TIMES DAILY BEFORE MEALS AND AT BEDTIME  Patient not taking: Reported on 9/23/2022 3/26/21   Provider, Historical         The following sections were reviewed & updated as appropriate: PMH, PSH, FH, and SH. Review of Systems   Constitutional:  Negative for activity change, appetite change, chills, fatigue and fever. Respiratory:  Negative for chest tightness and shortness of breath. Cardiovascular:  Negative for chest pain. Neurological:  Negative for dizziness and headaches. Objective:   Visit Vitals  BP (!) 144/88 (BP 1 Location: Right upper arm, BP Patient Position: Sitting, BP Cuff Size: Large adult)   Pulse 81   Temp 97.9 °F (36.6 °C) (Temporal)   Resp 18   Ht 5' 2\" (1.575 m)   Wt 180 lb 6.4 oz (81.8 kg)   SpO2 99%   BMI 33.00 kg/m²      Physical Exam  Constitutional:       General: She is not in acute distress. Appearance: She is well-developed. HENT:      Head: Normocephalic and atraumatic.    Neck:      Vascular: No carotid bruit. Cardiovascular:      Rate and Rhythm: Normal rate and regular rhythm. Heart sounds: Normal heart sounds. No murmur heard. No friction rub. No gallop. Pulmonary:      Effort: Pulmonary effort is normal.      Breath sounds: Normal breath sounds. No decreased breath sounds, wheezing, rhonchi or rales. Abdominal:      General: Bowel sounds are normal.      Palpations: Abdomen is soft. Tenderness: no abdominal tenderness   Musculoskeletal:      Cervical back: Normal range of motion and neck supple. Lymphadenopathy:      Cervical: No cervical adenopathy. Skin:     General: Skin is warm and dry. Neurological:      Mental Status: She is alert and oriented to person, place, and time. Disclaimer: The patient understands our medical plan. Alternatives have been explained and offered. The risks, benefits and significant side effects of all medications have been reviewed. Anticipated time course and progression of condition reviewed. All questions have been addressed. She is encouraged to employ the information provided in the after visit summary, which was reviewed. Where applicable, she is instructed to call the clinic if she has not been notified either by phone or through [address] with the results of her tests or with an appointment plan for any referrals within 1 week(s). No news is not good news; it's no news. The patient  is to call if her condition worsens or fails to improve or if significant side effects are experienced. Aspects of this note may have been generated using voice recognition software. Despite editing, there may be unrecognized errors.        Marli Jj NP

## 2022-09-23 NOTE — PROGRESS NOTES
1. \"Have you been to the ER, urgent care clinic since your last visit? Hospitalized since your last visit? \" No    2. \"Have you seen or consulted any other health care providers outside of the [address] [place] [address] since your last visit? \" No     3. For patients aged 39-70: Has the patient had a colonoscopy / FIT/ Cologuard? NA - based on age      If the patient is female:    4. For patients aged 41-77: Has the patient had a mammogram within the past 2 years? NA - based on age or sex      11. For patients aged 21-65: Has the patient had a pap smear? Yes - Care Gap present.  Most recent result on file

## 2022-09-28 ENCOUNTER — PATIENT OUTREACH (OUTPATIENT)
Dept: CASE MANAGEMENT | Age: 37
End: 2022-09-28

## 2022-09-28 NOTE — PROGRESS NOTES
Multiple attempts to contact patient for Complex Case Management enrollment. Messages with contact information provided. Will send letter and end episode.

## 2022-09-28 NOTE — LETTER
9/28/2022 2:22 PM    Ms. Annette Segura [address] [place] [address]        My name is Rell Chacon RN, [facility]. I am a Care Manager with [employer]. I often work with patients who could benefit from additional support understanding and managing their health. We are committed to providing you excellent care. I have been unable to reach you at [phone]. Please contact me at [phone] if you would like additional help with community resources. We appreciate the confidence you've shown by selecting us to provide your healthcare needs and I look forward to hearing from you soon.      Sincerely,     Rell Chacon RN, AC

## 2022-11-10 RX ORDER — OXYBUTYNIN CHLORIDE 5 MG/1
TABLET ORAL
Qty: 90 TABLET | Refills: 1 | Status: SHIPPED | OUTPATIENT
Start: 2022-11-10

## 2022-12-27 ENCOUNTER — TELEPHONE (OUTPATIENT)
Dept: FAMILY MEDICINE CLINIC | Age: 37
End: 2022-12-27

## 2022-12-27 NOTE — TELEPHONE ENCOUNTER
Pt trans from [facility], rep stating that pt says has rash and does not come up as urgent or red but unable to see past next 2days, adv pt no appt available this week, pt declined next available appt, states will go into urgent care

## 2023-01-01 ENCOUNTER — TELEPHONE (OUTPATIENT)
Age: 38
End: 2023-01-01

## 2023-02-24 ENCOUNTER — OFFICE VISIT (OUTPATIENT)
Age: 38
End: 2023-02-24
Payer: MEDICARE

## 2023-02-24 VITALS
SYSTOLIC BLOOD PRESSURE: 161 MMHG | BODY MASS INDEX: 29.4 KG/M2 | HEART RATE: 79 BPM | WEIGHT: 159.8 LBS | TEMPERATURE: 97.4 F | OXYGEN SATURATION: 98 % | RESPIRATION RATE: 18 BRPM | HEIGHT: 62 IN | DIASTOLIC BLOOD PRESSURE: 66 MMHG

## 2023-02-24 DIAGNOSIS — N18.5 CHRONIC KIDNEY DISEASE, STAGE 5 (HCC): ICD-10-CM

## 2023-02-24 DIAGNOSIS — E11.65 TYPE 2 DIABETES MELLITUS WITH HYPERGLYCEMIA, WITH LONG-TERM CURRENT USE OF INSULIN (HCC): ICD-10-CM

## 2023-02-24 DIAGNOSIS — N30.01 ACUTE CYSTITIS WITH HEMATURIA: ICD-10-CM

## 2023-02-24 DIAGNOSIS — R30.0 DYSURIA: Primary | ICD-10-CM

## 2023-02-24 DIAGNOSIS — Z00.00 MEDICARE ANNUAL WELLNESS VISIT, SUBSEQUENT: ICD-10-CM

## 2023-02-24 DIAGNOSIS — K52.9 CHRONIC DIARRHEA: ICD-10-CM

## 2023-02-24 DIAGNOSIS — Z79.4 TYPE 2 DIABETES MELLITUS WITH HYPERGLYCEMIA, WITH LONG-TERM CURRENT USE OF INSULIN (HCC): ICD-10-CM

## 2023-02-24 DIAGNOSIS — I48.0 PAROXYSMAL ATRIAL FIBRILLATION (HCC): ICD-10-CM

## 2023-02-24 DIAGNOSIS — R29.898 WEAKNESS OF BOTH LOWER EXTREMITIES: ICD-10-CM

## 2023-02-24 LAB
BILIRUBIN, URINE, POC: NEGATIVE
BLOOD URINE, POC: ABNORMAL
GLUCOSE URINE, POC: NEGATIVE
KETONES, URINE, POC: NEGATIVE
LEUKOCYTE ESTERASE, URINE, POC: ABNORMAL
NITRITE, URINE, POC: NEGATIVE
PH, URINE, POC: 5.5 (ref 4.6–8)
PROTEIN,URINE, POC: ABNORMAL
SPECIFIC GRAVITY, URINE, POC: 1.02 (ref 1–1.03)
URINALYSIS CLARITY, POC: ABNORMAL
URINALYSIS COLOR, POC: COLORLESS
UROBILINOGEN, POC: NORMAL

## 2023-02-24 PROCEDURE — 81002 URINALYSIS NONAUTO W/O SCOPE: CPT | Performed by: NURSE PRACTITIONER

## 2023-02-24 RX ORDER — LOPERAMIDE HYDROCHLORIDE 2 MG/1
2 CAPSULE ORAL 4 TIMES DAILY PRN
Qty: 120 CAPSULE | Refills: 2 | Status: SHIPPED | OUTPATIENT
Start: 2023-02-24

## 2023-02-24 RX ORDER — CEPHALEXIN 500 MG/1
500 CAPSULE ORAL DAILY
Qty: 6 CAPSULE | Refills: 0 | Status: SHIPPED | OUTPATIENT
Start: 2023-02-24 | End: 2023-03-02

## 2023-02-24 RX ORDER — CALCIUM CARBONATE 160(400)MG
TABLET,CHEWABLE ORAL
Qty: 1 EACH | Refills: 0 | Status: SHIPPED | OUTPATIENT
Start: 2023-02-24

## 2023-02-24 SDOH — ECONOMIC STABILITY: FOOD INSECURITY: WITHIN THE PAST 12 MONTHS, YOU WORRIED THAT YOUR FOOD WOULD RUN OUT BEFORE YOU GOT MONEY TO BUY MORE.: NEVER TRUE

## 2023-02-24 SDOH — ECONOMIC STABILITY: INCOME INSECURITY: HOW HARD IS IT FOR YOU TO PAY FOR THE VERY BASICS LIKE FOOD, HOUSING, MEDICAL CARE, AND HEATING?: VERY HARD

## 2023-02-24 SDOH — ECONOMIC STABILITY: FOOD INSECURITY: WITHIN THE PAST 12 MONTHS, THE FOOD YOU BOUGHT JUST DIDN'T LAST AND YOU DIDN'T HAVE MONEY TO GET MORE.: NEVER TRUE

## 2023-02-24 SDOH — ECONOMIC STABILITY: HOUSING INSECURITY
IN THE LAST 12 MONTHS, WAS THERE A TIME WHEN YOU DID NOT HAVE A STEADY PLACE TO SLEEP OR SLEPT IN A SHELTER (INCLUDING NOW)?: PATIENT REFUSED

## 2023-02-24 ASSESSMENT — PATIENT HEALTH QUESTIONNAIRE - PHQ9
SUM OF ALL RESPONSES TO PHQ QUESTIONS 1-9: 0
SUM OF ALL RESPONSES TO PHQ QUESTIONS 1-9: 0
2. FEELING DOWN, DEPRESSED OR HOPELESS: 0
SUM OF ALL RESPONSES TO PHQ9 QUESTIONS 1 & 2: 0
SUM OF ALL RESPONSES TO PHQ QUESTIONS 1-9: 0
1. LITTLE INTEREST OR PLEASURE IN DOING THINGS: 0
SUM OF ALL RESPONSES TO PHQ QUESTIONS 1-9: 0

## 2023-02-24 ASSESSMENT — ENCOUNTER SYMPTOMS
DIARRHEA: 1
CHEST TIGHTNESS: 0
SHORTNESS OF BREATH: 0

## 2023-02-24 ASSESSMENT — LIFESTYLE VARIABLES
HOW MANY STANDARD DRINKS CONTAINING ALCOHOL DO YOU HAVE ON A TYPICAL DAY: PATIENT DOES NOT DRINK
HOW OFTEN DO YOU HAVE A DRINK CONTAINING ALCOHOL: NEVER

## 2023-02-24 NOTE — PROGRESS NOTES
1. \"Have you been to the ER, urgent care clinic since your last visit? Hospitalized since your last visit? \"  [address]    2. \"Have you seen or consulted any other health care providers outside of the [address] [place] [address] since your last visit? \" No     3. For patients aged 39-70: Has the patient had a colonoscopy / FIT/ Cologuard? NA - based on age      If the patient is female:    4. For patients aged 41-77: Has the patient had a mammogram within the past 2 years? NA - based on age or sex      11. For patients aged 21-65: Has the patient had a pap smear? Yes - Care Gap present.  Most recent result on file

## 2023-02-24 NOTE — PATIENT INSTRUCTIONS
Learning About Emotional Support  When do you need emotional support?     You might find getting support from others helpful when you have a long-term health problem. Often people feel alone, confused, or scared when coping with an illness. But you aren't alone. Other people are going through the same thing you are and know how you feel.  Talking with others about your feelings can help you feel better.  Your family and friends can give you support. So can your doctor, a support group, or a Pentecostalism. If you have a support network, you will not feel as alone. You will learn new ways to deal with your situation, and you may try harder to overcome it.  Where you can get support  Family and friends: They can help you cope by giving you comfort and encouragement.  Counseling: Professional counseling can help you cope with situations that interfere with your life and cause stress. Counseling can help you understand and deal with your illness.  Your doctor: Find a doctor you trust and feel comfortable with. Be open and honest about your fears and concerns. Your doctor can help you get the right medical treatments, including counseling.  Spiritual or Mormon groups: They can provide comfort and may be able to help you find counseling or other social support services.  Social groups: They can help you meet new people and get involved in activities you enjoy.  Community support groups: In a support group, you can talk to others who have dealt with the same problems or illness as you. You can encourage one another and learn ways to cope with tough emotions.  How to find a support group  Ask your doctor, counselor, or other health professional for suggestions.  Contact your local Pentecostalism, Denominational, Sabianism, or other Mormon group.  Ask your family and friends.  Ask people who have the same health concerns.  Go online. Forums and blogs let you read messages from others and leave your own messages. You can exchange stories,  vent your frustrations, and ask and answer questions. Contact a city, state, or national group that provides support for your health concerns. Your library or community center may have a list of these groups. Or you can look for information online. Look for a support group that works for you. Ask yourself if you prefer structure and would like a , or if you would like a less formal group. Do you prefer face-to-face meetings? Or do you feel more secure in online chat rooms or forums? Supportive relationships  A supportive relationship includes emotional support such as love, trust, and understanding, as well as advice and concrete help, such as help managing your time. Reach out to others  Family and friends can help you. Ask them to:  Listen to you and give you encouragement. This can keep you from feeling hopeless or alone. Help with small daily tasks or with bigger problems. A helping hand can keep you from feeling overwhelmed. Help you manage a health problem. For example, ask them to go to doctor visits with you. Your loved ones can offer support by being involved in your medical care. Respect your relationships  A good relationship is also a two-way street. You count on help from others, but they also count on you. Know your friends' limits. You don't have to see or call your friends every day. If you are going through a rough patch, ask friends if you can contact them outside of the usual boundaries. Don't always complain or talk about yourself. Know when it's time to stop talking and listen or just enjoy your friend's company. Know that good friends can be a bad influence. For example, if a friend encourages you to drink when you know it will harm you, you may want to end the friendship. Where can you learn more? Go to http://www.woods.com/ and enter G092 to learn more about \"Learning About Emotional Support. \"  Current as of: February 9, 8114               GPSHAZG Version: 13.5  © 9910-7551 [employer]. Care instructions adapted under license by [facility] ([facility]). If you have questions about a medical condition or this instruction, always ask your healthcare professional. [address] any warranty or liability for your use of this information. Learning About Stress  What is stress? Stress is what you feel when you have to handle more than you are used to. Stress is a fact of life for most people, and it affects everyone differently. What causes stress for you may not be stressful for someone else. A lot of things can cause stress. You may feel stress when you go on a job interview, take a test, or run a race. This kind of short-term stress is normal and even useful. It can help you if you need to work hard or react quickly. For example, stress can help you finish an important job on time. Stress also can last a long time. Long-term stress is caused by stressful situations or events. Examples of long-term stress include long-term health problems, ongoing problems at work, or conflicts in your family. Long-term stress can harm your health. How does stress affect your health? When you are stressed, your body responds as though you are in danger. It makes hormones that speed up your heart, make you breathe faster, and give you a burst of energy. This is called the fight-or-flight stress response. If the stress is over quickly, your body goes back to normal and no harm is done. But if stress happens too often or lasts too long, it can have bad effects. Long-term stress can make you more likely to get sick, and it can make symptoms of some diseases worse. If you tense up when you are stressed, you may develop neck, shoulder, or low back pain. Stress is linked to high blood pressure and heart disease. Stress also harms your emotional health. It can make you buchanan, tense, or depressed.  Your relationships may suffer, and you may not do well at work or school. What can you do to manage stress? How to relax your mind   Write. It may help to write about things that are bothering you. This helps you find out how much stress you feel and what is causing it. When you know this, you can find better ways to cope. Let your feelings out. Talk, laugh, cry, and express anger when you need to. Talking with friends, family, a counselor, or a member of the clergy about your feelings is a healthy way to relieve stress. Do something you enjoy. For example, listen to music or go to a movie. Practice your hobby or do volunteer work. Meditate. This can help you relax, because you are not worrying about what happened before or what may happen in the future. Do guided imagery. Imagine yourself in any setting that helps you feel calm. You can use audiotapes, books, or a teacher to guide you. How to relax your body   Do something active. Exercise or activity can help reduce stress. Walking is a great way to get started. Even everyday activities such as housecleaning or yard work can help. Do breathing exercises. For example:  From a standing position, bend forward from the waist with your knees slightly bent. Let your arms dangle close to the floor. Breathe in slowly and deeply as you return to a standing position. Roll up slowly and lift your head last.  Hold your breath for just a few seconds in the standing position. Breathe out slowly and bend forward from the waist.  Try yoga or matt chi. These techniques combine exercise and meditation. You may need some training at first to learn them. What can you do to prevent stress? Manage your time. This helps you find time to do the things you want and need to do. Get enough sleep. Your body recovers from the stresses of the day while you are sleeping. Get support. Your family, friends, and community can make a difference in how you experience stress. Where can you learn more?   Go to http://www.woods.com/ and enter N032 to learn more about \"Learning About Stress. \"  Current as of: October 6, 2021               Content Version: 13.5  © 2006-2022 Healthwise[employer] Inc[employer]. Care instructions adapted under license by [facility] ([facility]). If you have questions about a medical condition or this instruction, always ask your healthcare professional. [address] any warranty or liability for your use of this information. Learning About Getting In and Out of a Bathtub Safely  Introduction  Many falls happen during bathing. All that water makes the bathroom a slippery place. You may no longer be able to step over the tub wall comfortably and safely. You might lean on things that aren't meant to support your weight, like a towel bar or the shower curtain. There are several types of aids that will help keep you safe. You can buy them at [employer] or home improvement stores or online. What tools can help you get in and out of a tub? Tub rail and grab bar  There are many types of bars and rails you can install on the walls next to your tub or on the edge of the tub. They will help keep you safe while you're getting in or out of the tub. It's important to have them permanently installed, rather than attached with suction. Transfer bench  There are several kinds of benches or seats to use in the bathtub. One type sits in the tub and extends out over the side. You sit on the bench. Lift one leg at a time into the tub, sliding your body over as you do so. Another common tub bench sits inside the tub. To use this type you need to be able to safely step into the tub before you sit down. Nonskid mats  Water makes both the floor of the tub and the bathroom floor slippery. You can buy adhesive strips that stick to the floor of the tub. Or you can use a nonskid tub mat. Outside the tub, make sure you use a rug with a nonskid bottom. Don't use a plain towel.   Hand-held shower faucet  With a hand-held faucet, you can get clean without having to lower yourself into the tub. Hang a shower curtain to keep water from spilling out onto the floor. Follow-up care is a key part of your treatment and safety. Be sure to make and go to all appointments, and call your doctor if you are having problems. It's also a good idea to know your test results and keep a list of the medicines you take. Current as of: March 9, 2022               Content Version: 13.5  © 2123-4892 Healthwise, Incorporated. Care instructions adapted under license by [facility] ([facility]). If you have questions about a medical condition or this instruction, always ask your healthcare professional. [address] any warranty or liability for your use of this information. Personalized Preventive Plan for Odella Chol - 2/24/2023  Medicare offers a range of preventive health benefits. Some of the tests and screenings are paid in full while other may be subject to a deductible, co-insurance, and/or copay. Some of these benefits include a comprehensive review of your medical history including lifestyle, illnesses that may run in your family, and various assessments and screenings as appropriate. After reviewing your medical record and screening and assessments performed today your provider may have ordered immunizations, labs, imaging, and/or referrals for you. A list of these orders (if applicable) as well as your Preventive Care list are included within your After Visit Summary for your review. Other Preventive Recommendations:    A preventive eye exam performed by an eye specialist is recommended every 1-2 years to screen for glaucoma; cataracts, macular degeneration, and other eye disorders. A preventive dental visit is recommended every 6 months. Try to get at least 150 minutes of exercise per week or 10,000 steps per day on a pedometer .   Order or download the FREE \"Exercise & Physical Activity: Your Everyday Guide\" from [employer]. Call 1-726.151.6595 or search [employer] online.  You need 2230-2940 mg of calcium and 9122-4171 IU of vitamin D per day. It is possible to meet your calcium requirement with diet alone, but a vitamin D supplement is usually necessary to meet this goal.  When exposed to the sun, use a sunscreen that protects against both UVA and UVB radiation with an SPF of 30 or greater. Reapply every 2 to 3 hours or after sweating, drying off with a towel, or swimming.  Always wear a seat belt when traveling in a car. Always wear a helmet when riding a bicycle or motorcycle.

## 2023-02-24 NOTE — PROGRESS NOTES
Medicare Annual Wellness Visit    Italo Cain is here for Medicare AWV and Dysuria (X's 1 week )    Assessment & Plan   Dysuria  -     AMB POC URINALYSIS DIP STICK MANUAL W/O MICRO  -     [place] OUTPAT[place] VISIT 25 MINUTES [71560]  Type 2 diabetes mellitus with hyperglycemia, with long-term current use of insulin (HCC)  Chronic kidney disease, stage 5 (HCC)  Paroxysmal atrial fibrillation (HCC)  Assessment & Plan:   Monitored by specialist- no acute findings meriting change in the plan    Chronic diarrhea  -     loperamide (IMODIUM) 2 MG capsule; Take 1 capsule by mouth 4 times daily as needed for Diarrhea, Disp-120 capsule, R-2Normal  -     [place] VISIT 25 MINUTES [[id number]]  Weakness of both lower extremities  -     Misc. Devices (ROLLATOR ULTRA-LIGHT) MISC; Disp-1 each, R-0, PrintICD-10: R29.898 Ht: 5' 2\" Wt: 72.5 kg  -     [place] VISIT 25 MINUTES [[id number]]  Medicare annual wellness visit, subsequent  Acute cystitis with hematuria      Orders Placed This Encounter    AMB POC URINALYSIS DIP STICK MANUAL W/O MICRO    [place] OUT[place]IENT VISIT 25 MINUTES [[id number]]    loperamide (IMODIUM) 2 MG capsule     Sig: Take 1 capsule by mouth 4 times daily as needed for Diarrhea     Dispense:  120 capsule     Refill:  2    Misc. Devices (ROLLATOR ULTRA-LIGHT) MISC     Sig: ICD-10: R29.898  Ht: 5' 2\"  Wt: 72.5 kg     Dispense:  1 each     Refill:  0    cephALEXin (KEFLEX) 500 MG capsule     Sig: Take 1 capsule by mouth daily for 6 days     Dispense:  6 capsule     Refill:  0     Recommendations for Preventive Services Due: see orders and patient instructions/AVS.  Recommended screening schedule for the next 5-10 years is provided to the patient in written form: see Patient Instructions/AVS.         I have discussed the diagnosis with the patient and the intended plan as seen in the above orders. The patient has received an after-visit summary and questions were answered concerning future plans.   I have discussed medication side effects and warnings with the patient as well. Patient agreeable with above plan and verbalizes understanding. Return in 1 year (on 2/24/2024) for Medicare Annual Wellness Visit in 1 year, Medicare Wellness. Subjective   The following acute and/or chronic problems were also addressed today:  States she is having constant diarrhea. Reports she is taking imodium as needed which does help when she takes it. Reports she is currently under the care of GI for diarrhea/nausea/vomiting work up. States she recently had a colonoscopy which was normal.  Further states she was referred to hematology by GI for further evaluation as well. States she has been unable to hold down any food. Reports she is able to hold down apple juice and water. States she feels dehydrated. Hasn't been able to hold down medications. Reports her phosphorus levels are elevated and was informed this week she needs to have a procedure performed for this at [facility]. Per patient she was informed she has \"holes\" in her bones and they are weak. Comments she has been feeling very weak. Reports her legs feel weak like they are going to give out. Reports her legs have given out before however, has been close to an object to hold on to so she won't fall. Dysuria: states she has been experiencing tingling with urination for the last week. She does continue to make a small amount of urine. Further states her urine is cloudy with associated odor. Denies any vaginal discharge/itching. Denies worsening of symptoms.     Results for orders placed or performed in visit on 02/24/23   AMB POC URINALYSIS DIP STICK MANUAL W/O MICRO   Result Value Ref Range    Color (UA POC) Colorless     Clarity (UA POC) Turbid     Glucose, Urine, POC Negative Negative    Bilirubin, Urine, POC Negative Negative    Ketones, Urine, POC Negative Negative    Specific Gravity, Urine, POC 1.025 1.001 - 1.035    Blood (UA POC) 3+ Negative pH, Urine, POC 5.5 4.6 - 8.0    Protein, Urine, POC 3+ Negative    Urobilinogen, POC Normal     Nitrite, Urine, POC Negative Negative    Leukocyte Esterase, Urine, POC 1+ Negative     Patient's complete Health Risk Assessment and screening values have been reviewed and are found in Flowsheets. The following problems were reviewed today and where indicated follow up appointments were made and/or referrals ordered. Positive Risk Factor Screenings with Interventions:       Cognitive: Words recalled: 1 Word Recalled           Total Score Interpretation: Abnormal Mini-Cog    Interventions:  Patient declines any further evaluation or treatment          Self-assessment of health: In general, how would you say your health is?: (!) Poor  Interventions:  Patient declines any further evaluation or treatment    General HRA Questions:  Select all that apply: (!) Stress  Stress Interventions:  Patient comments: it her current health and upcoming procedures       Weight and Activity:  Physical Activity: Inactive    Days of Exercise per Week: 0 days    Minutes of Exercise per Session: 0 min     On average, how many days per week do you engage in moderate to strenuous exercise (like a brisk walk)?: 0 days  Have you lost any weight without trying in the past 3 months?: (!) Yes  Body mass index: (!) 29.22    Inactivity Interventions:  Patient comments: experiencing leg weakness    Unintentional Weight Loss Interventions:  Patient comments: constant vomiting, unable to hold food down         Safety:  Do you have either shower bars, grab bars, non-slip mats or non-slip surfaces in your shower or bathtub?: (!) No  Interventions:  See AVS for additional education material    ADL's:   Patient reports needing help with:  Select all that apply: (!) Walking/Balance  Interventions:   Will order rollator today    Advanced Directives:  Do you have a Living Will?: (!) No  Intervention:  has NO advanced directive - not interested in additional information       Review of Systems   Constitutional:  Negative for chills, fatigue and fever.   Respiratory:  Negative for chest tightness and shortness of breath.    Cardiovascular:  Negative for chest pain and palpitations.   Gastrointestinal:  Positive for diarrhea.   Genitourinary:  Positive for dysuria.   Neurological:  Positive for weakness (bilateral legs). Negative for dizziness and headaches.           Objective   Vitals:    02/24/23 1057 02/24/23 1113   BP: (!) 180/65 (!) 161/66   Site: Left Upper Arm Left Upper Arm   Position: Sitting Sitting   Cuff Size: Medium Adult Medium Adult   Pulse: 84 79   Resp: 18    Temp: 97.4 °F (36.3 °C)    TempSrc: Temporal    SpO2: 98%    Weight: 159 lb 12.8 oz (72.5 kg)    Height: 5' 2\" (1.575 m)       Body mass index is 29.23 kg/m².      Physical Exam  Constitutional:       Appearance: Normal appearance. She is ill-appearing.   HENT:      Head: Normocephalic and atraumatic.   Cardiovascular:      Rate and Rhythm: Normal rate and regular rhythm.      Pulses: Normal pulses.      Heart sounds: Normal heart sounds.   Pulmonary:      Effort: Pulmonary effort is normal.      Breath sounds: Normal breath sounds.   Musculoskeletal:      Cervical back: Neck supple.      Right lower leg: No edema.      Left lower leg: No edema.   Skin:     General: Skin is warm and dry.   Neurological:      Mental Status: She is alert and oriented to person, place, and time.          Allergies   Allergen Reactions    Latex Hives    Iodine Other (See Comments)     \"Burning with shooting pain\"    Nitrofurantoin Diarrhea     Prior to Visit Medications    Medication Sig Taking? Authorizing Provider   amLODIPine (NORVASC) 10 MG tablet Take 10 mg by mouth daily Yes Ar Automatic Reconciliation   apixaban (ELIQUIS) 5 MG TABS tablet Take 5 mg by mouth 2 times daily Yes Ar Automatic Reconciliation   busPIRone (BUSPAR) 5 MG tablet Take 5 mg by mouth 2 times daily Yes Ar Automatic Reconciliation  butalbital-acetaminophen-caffeine (FIORICET, ESGIC) -40 MG per tablet Take 1 tablet by mouth every 6 hours as needed Yes Ar Automatic Reconciliation   calcitRIOL (ROCALTROL) 0.25 MCG capsule Take 0.25 mcg by mouth daily Yes Ar Automatic Reconciliation   carvedilol (COREG) 25 MG tablet Take 25 mg by mouth 2 times daily Yes Ar Automatic Reconciliation   fluconazole (DIFLUCAN) 150 MG tablet Take 150 mg by mouth every 7 days Yes Ar Automatic Reconciliation   hydrALAZINE (APRESOLINE) 50 MG tablet Take 50 mg by mouth 2 times daily Yes Ar Automatic Reconciliation   insulin lispro (HUMALOG) 100 UNIT/ML SOLN injection vial Inject 4-12 Units into the skin 3 times daily (before meals) Yes Ar Automatic Reconciliation   loperamide (IMODIUM) 2 MG capsule TAKE 2 CAPSULES BY MOUTH, FOLLOWED BY 1 CAPSULE AFTER EACH LOOSE STOOL MAX DAILY AMOUNT: 8 CAPSULES Yes Ar Automatic Reconciliation   losartan (COZAAR) 100 MG tablet Take 100 mg by mouth daily Yes Ar Automatic Reconciliation   omeprazole (PRILOSEC) 40 MG delayed release capsule Take 40 mg by mouth 2 times daily Yes Ar Automatic Reconciliation   ondansetron (ZOFRAN) 4 MG tablet Take 4 mg by mouth every 8 hours as needed Yes Ar Automatic Reconciliation   oxybutynin (DITROPAN) 5 MG tablet TAKE 1 TABLET BY MOUTH DAILY Yes Ar Automatic Reconciliation   pregabalin (LYRICA) 75 MG capsule TAKE 1 CAPSULE BY MOUTH THREE TIMES DAILY Yes Ar Automatic Reconciliation   rosuvastatin (CRESTOR) 10 MG tablet TAKE 1 TABLET BY MOUTH EVERY NIGHT Yes Ar Automatic Reconciliation   sevelamer (RENVELA) 800 MG tablet TAKE 1 TABLET BY MOUTH THREE TIMES A DAY WITH MEALS Yes Ar Automatic Reconciliation   valACYclovir (VALTREX) 1 g tablet Take 1 tab daily x5 days for outbreak Yes Ar Automatic Reconciliation   metoclopramide (REGLAN) 5 MG tablet TAKE 1 TABLET BY MOUTH FOUR TIMES DAILY BEFORE MEALS AND AT BEDTIME  Patient not taking: Reported on 2/24/2023  Ar Automatic Reconciliation     [facility] (Including outside providers/suppliers regularly involved in providing care):   Patient Care Team:  BRANDIN Adam - NP as PCP - [address] [place] [address] Street, APRN - NP as PCP - [facility] Provider  Mickey Mckeon MD as Physician  Lilian Ji, RN as Ambulatory Care Manager     Reviewed and updated this visit:  Tobacco  Allergies  Meds  Problems  Med Hx  Surg Hx  Soc Hx  Fam Hx

## 2023-02-27 PROBLEM — A41.9 SEPSIS (HCC): Status: ACTIVE | Noted: 2023-02-27

## 2023-02-27 PROBLEM — R41.82 ALTERED MENTAL STATUS: Status: ACTIVE | Noted: 2023-01-01

## 2023-03-13 PROBLEM — Z79.4 TYPE 2 DIABETES MELLITUS WITH DIABETIC PERIPHERAL ANGIOPATHY AND GANGRENE, WITH LONG-TERM CURRENT USE OF INSULIN (HCC): Status: ACTIVE | Noted: 2023-01-01

## 2023-03-13 PROBLEM — E11.52 TYPE 2 DIABETES MELLITUS WITH DIABETIC PERIPHERAL ANGIOPATHY AND GANGRENE, WITH LONG-TERM CURRENT USE OF INSULIN (HCC): Status: ACTIVE | Noted: 2023-03-13

## 2023-04-05 PROBLEM — E43 SEVERE PROTEIN-CALORIE MALNUTRITION (HCC): Status: ACTIVE | Noted: 2023-01-01
